# Patient Record
Sex: FEMALE | Race: WHITE | NOT HISPANIC OR LATINO | Employment: OTHER | URBAN - NONMETROPOLITAN AREA
[De-identification: names, ages, dates, MRNs, and addresses within clinical notes are randomized per-mention and may not be internally consistent; named-entity substitution may affect disease eponyms.]

---

## 2017-03-09 RX ORDER — LOSARTAN POTASSIUM 100 MG/1
TABLET ORAL
Qty: 90 TABLET | Refills: 1 | Status: SHIPPED | OUTPATIENT
Start: 2017-03-09 | End: 2017-08-08 | Stop reason: SDUPTHER

## 2017-06-07 RX ORDER — DILTIAZEM HYDROCHLORIDE 300 MG/1
CAPSULE, COATED, EXTENDED RELEASE ORAL
Qty: 30 CAPSULE | Refills: 0 | Status: SHIPPED | OUTPATIENT
Start: 2017-06-07 | End: 2017-07-27 | Stop reason: SDUPTHER

## 2017-06-07 RX ORDER — TOLTERODINE 4 MG/1
CAPSULE, EXTENDED RELEASE ORAL
Qty: 30 CAPSULE | Refills: 0 | Status: SHIPPED | OUTPATIENT
Start: 2017-06-07 | End: 2017-09-28 | Stop reason: SDUPTHER

## 2017-06-07 RX ORDER — INSULIN DEGLUDEC 200 U/ML
INJECTION, SOLUTION SUBCUTANEOUS
Qty: 5 PEN | Refills: 0 | Status: SHIPPED | OUTPATIENT
Start: 2017-06-07 | End: 2017-07-27 | Stop reason: SDUPTHER

## 2017-06-07 NOTE — TELEPHONE ENCOUNTER
Patient will come in and get labs and schedule an appointment.  Are the standing orders in the computer all that she needs?

## 2017-06-12 ENCOUNTER — TELEPHONE (OUTPATIENT)
Dept: FAMILY MEDICINE CLINIC | Facility: CLINIC | Age: 47
End: 2017-06-12

## 2017-06-12 DIAGNOSIS — IMO0001 UNCONTROLLED TYPE 2 DIABETES MELLITUS WITHOUT COMPLICATION, WITH LONG-TERM CURRENT USE OF INSULIN: ICD-10-CM

## 2017-07-12 RX ORDER — DOXYCYCLINE 100 MG/1
100 CAPSULE ORAL 2 TIMES DAILY
Qty: 20 CAPSULE | Refills: 0 | Status: SHIPPED | OUTPATIENT
Start: 2017-07-12 | End: 2017-07-22

## 2017-07-21 ENCOUNTER — OFFICE VISIT (OUTPATIENT)
Dept: ORTHOPEDIC SURGERY | Facility: CLINIC | Age: 47
End: 2017-07-21

## 2017-07-21 VITALS
SYSTOLIC BLOOD PRESSURE: 212 MMHG | BODY MASS INDEX: 45.99 KG/M2 | HEART RATE: 85 BPM | WEIGHT: 293 LBS | DIASTOLIC BLOOD PRESSURE: 93 MMHG | HEIGHT: 67 IN

## 2017-07-21 DIAGNOSIS — R63.5 ABNORMAL WEIGHT GAIN: ICD-10-CM

## 2017-07-21 DIAGNOSIS — S91.209S AVULSED TOENAIL, SEQUELA: Primary | ICD-10-CM

## 2017-07-21 DIAGNOSIS — IMO0002 UNCONTROLLED TYPE 2 DIABETES MELLITUS WITH DIABETIC POLYNEUROPATHY, WITHOUT LONG-TERM CURRENT USE OF INSULIN: ICD-10-CM

## 2017-07-21 PROBLEM — S91.209A AVULSED TOENAIL: Status: ACTIVE | Noted: 2017-07-21

## 2017-07-21 PROCEDURE — 99203 OFFICE O/P NEW LOW 30 MIN: CPT | Performed by: ORTHOPAEDIC SURGERY

## 2017-07-21 RX ORDER — INSULIN ASPART 100 [IU]/ML
INJECTION, SOLUTION INTRAVENOUS; SUBCUTANEOUS
Refills: 6 | COMMUNITY
Start: 2017-05-08 | End: 2017-08-08 | Stop reason: SDUPTHER

## 2017-07-21 RX ORDER — EFINACONAZOLE 100 MG/ML
SOLUTION TOPICAL
Refills: 6 | COMMUNITY
Start: 2017-07-11 | End: 2018-01-15

## 2017-07-21 RX ORDER — KETOCONAZOLE 20 MG/G
CREAM TOPICAL
Refills: 5 | COMMUNITY
Start: 2017-07-10 | End: 2017-07-27

## 2017-07-21 RX ORDER — MELOXICAM 15 MG/1
TABLET ORAL
Refills: 0 | COMMUNITY
Start: 2017-07-07 | End: 2017-07-27

## 2017-07-21 NOTE — PROGRESS NOTES
NEW PATIENT    Patient: Azalea Mendoza  : 1970    Primary Care Provider: Shelby Hatfield MD    Requesting Provider: As above    Toe Pain (Left second toe ulcer)      History    Chief Complaint: left 2nd toe problem    History of Present Illness: this is a 46 year old diabetic who I have seen in the past for foot problems.  Her mother in law was my patient- had very severe diabetic problems, amputations, etc.  She had a left 2nd toenail removed at the podiatrist office and wants to make sure it is healing appropriately.  No fever, no chills.  The nail was removed because it was fungal.  She has not had a HGA1c done in a long time, it was in the high 6's when it was done.  She thinks it will be much worse now because she has gained weight and is not taking care of herself.      Current Outpatient Prescriptions on File Prior to Visit   Medication Sig Dispense Refill   • cetirizine (ZyrTEC) 10 MG tablet Take 1 tablet by mouth every night.     • diltiaZEM CD (CARDIZEM CD) 300 MG 24 hr capsule TAKE 1 CAPSULE DAILY. 30 capsule 0   • doxycycline (MONODOX) 100 MG capsule Take 1 capsule by mouth 2 (Two) Times a Day for 10 days. 20 capsule 0   • EPINEPHrine (EPIPEN 2-BILL) 0.3 MG/0.3ML solution auto-injector injection EpiPen 2-Bill 0.3 MG/0.3ML Injection Solution Auto-injector; Patient Sig: EpiPen 2-Bill 0.3 MG/0.3ML Injection Solution Auto-injector as directed; 1; 1; 28-Mar-2016; Active     • EPINEPHrine (EPIPEN) 0.3 MG/0.3ML solution auto-injector injection EpiPen 2-Bill 0.3 MG/0.3ML CAMILLA; Patient Sig: EpiPen 2-Bill 0.3 MG/0.3ML CAMILLA ; 2; 0; 07-Mar-2014; Active     • EPIPEN 2-BILL 0.3 MG/0.3ML solution auto-injector injection USE AS INSTRUCTED 1 each 1   • glucose blood test strip 1 each by Other route 3 (three) times a day. Use as instructed     • Insulin Degludec (TRESIBA FLEXTOUCH) 200 UNIT/ML solution pen-injector Inject 50 Units under the skin every night.     • Insulin Pen Needle 32G X 4 MM misc Use three daily with  Novolog and once daily with tresibia 180 each 11   • losartan (COZAAR) 100 MG tablet TAKE ONE TABLET BY MOUTH EVERY DAY 90 tablet 1   • mupirocin (BACTROBAN) 2 % cream Apply  topically 3 (three) times a day. 15 g 6   • omeprazole (PriLOSEC) 20 MG capsule TAKE 1 CAPSULE DAILY. 90 capsule 3   • tolterodine LA (DETROL LA) 4 MG 24 hr capsule TAKE 1 CAPSULE BY MOUTH DAILY. 30 capsule 0   • TRESIBA FLEXTOUCH 200 UNIT/ML solution pen-injector INJECT 50 UNITS UNDER THE SKIN EVERY NIGHT. 5 pen 0   • albuterol (PROVENTIL HFA;VENTOLIN HFA) 108 (90 BASE) MCG/ACT inhaler Inhale 1-2 puffs every 4 (four) hours as needed.     • fexofenadine (ALLEGRA) 180 MG tablet Take 1 tablet by mouth daily.       No current facility-administered medications on file prior to visit.       Allergies   Allergen Reactions   • Ciprofloxacin-Dexamethasone    • Fish Allergy Itching, Nausea Only and Nausea And Vomiting   • Neomycin-Bacitracin Zn-Polymyx    • Penicillins       Past Medical History:   Diagnosis Date   • Abnormal weight gain    • Acute bronchitis with bronchospasm    • Acute maxillary sinusitis    • Acute serous otitis media    • Acute suppurative otitis media of both ears without spontaneous rupture of tympanic membranes    • Allergic rhinitis due to pollen    • Back pain, lumbosacral    • Cervicalgia    • Cyst of breast    • Diabetes mellitus    • Esophagitis    • H/O cystoscopy     diagnostic cystoscopy   • Hypertension    • IBS (irritable bowel syndrome)    • Lump or mass in breast    • Muscle spasm    • Ovarian cancer    • Right knee pain    • Shingles    • Sleep apnea    • UTI (urinary tract infection)      Past Surgical History:   Procedure Laterality Date   • COLONOSCOPY     • CYST REMOVAL     • CYSTOSCOPY     • TOTAL ABDOMINAL HYSTERECTOMY WITH SALPINGO OOPHORECTOMY     • TUMOR REMOVAL       Family History   Problem Relation Age of Onset   • Hypertension Mother    • Diabetes Mother      type 2   • Arthritis Father    • Hypertension  "Father    • Hyperlipidemia Father    • Diabetes Father      type 2   • Cancer Other       Social History     Social History   • Marital status:      Spouse name: N/A   • Number of children: N/A   • Years of education: N/A     Occupational History   • Not on file.     Social History Main Topics   • Smoking status: Never Smoker   • Smokeless tobacco: Never Used   • Alcohol use No   • Drug use: No   • Sexual activity: Defer     Other Topics Concern   • Not on file     Social History Narrative        Review of Systems   Constitutional: Negative.    HENT: Negative.    Eyes: Negative.    Respiratory: Positive for apnea.    Cardiovascular: Negative.    Gastrointestinal: Negative.    Endocrine: Negative.    Genitourinary: Negative.    Musculoskeletal: Positive for joint swelling.   Skin: Negative.    Allergic/Immunologic: Positive for environmental allergies and food allergies.   Neurological: Negative.    Hematological: Negative.    Psychiatric/Behavioral: Negative.        The following portions of the patient's history were reviewed and updated as appropriate: allergies, current medications, past family history, past medical history, past social history, past surgical history and problem list.    Physical Exam:   BP (!) 212/93  Pulse 85  Ht 67\" (170.2 cm)  Wt (!) 358 lb (162 kg)  BMI 56.07 kg/m2  GENERAL: Body habitus: morbidly obese    Lower extremity edema: Left: none; Right: none    Varicose veins:  Left: none; Right: none    Gait: normal     Mental Status:  awake and alert; oriented to person, place, and time    Voice:  clear  SKIN:  Normal and warm and dry    Hair Growth:  Right:normal; Left:  normal  NAILS: Toenails: left 2nd toenil has been removed, the nail bed is healing well, no sign of infection  HEENT: Head: Normocephalic, atraumatic,  without obvious abnormality.  eye: normal external eye, no icterus  ears: normal external ears  nose: normal external nose  PULM:  Repiratory effort " normal  CV:  Dorsalis Pedis:  Right: 2+; Left:2+    Posterior Tibial: Right:2+; Left:2+    Capillary Refill:  Brisk       MSK/NEURO- dense neuropathy, no ulcers, no pre-ulcerous lesions, no Charcot  Medical Decision Making    Data Review:   none    Assessment and Plan/ Diagnosis/Treatment options:   Her nail bed is healing well, no concerns.  We discussed fungal nails, basically it is a cosmetic problem, rarely causes any bacterial infection.  I encouraged her to get her HGA1c done, and improve glucose control to avoid complications.  I will see her as needed

## 2017-07-27 ENCOUNTER — OFFICE VISIT (OUTPATIENT)
Dept: FAMILY MEDICINE CLINIC | Facility: CLINIC | Age: 47
End: 2017-07-27

## 2017-07-27 VITALS
BODY MASS INDEX: 55.91 KG/M2 | TEMPERATURE: 98.4 F | OXYGEN SATURATION: 95 % | SYSTOLIC BLOOD PRESSURE: 150 MMHG | HEART RATE: 101 BPM | WEIGHT: 293 LBS | RESPIRATION RATE: 18 BRPM | DIASTOLIC BLOOD PRESSURE: 102 MMHG

## 2017-07-27 DIAGNOSIS — E78.00 PURE HYPERCHOLESTEROLEMIA: ICD-10-CM

## 2017-07-27 DIAGNOSIS — M25.50 ARTHRALGIA OF MULTIPLE JOINTS: ICD-10-CM

## 2017-07-27 DIAGNOSIS — I10 BENIGN ESSENTIAL HYPERTENSION: Primary | ICD-10-CM

## 2017-07-27 DIAGNOSIS — IMO0001 UNCONTROLLED TYPE 2 DIABETES MELLITUS WITHOUT COMPLICATION, WITH LONG-TERM CURRENT USE OF INSULIN: ICD-10-CM

## 2017-07-27 PROCEDURE — 99214 OFFICE O/P EST MOD 30 MIN: CPT | Performed by: INTERNAL MEDICINE

## 2017-07-27 RX ORDER — DILTIAZEM HYDROCHLORIDE 360 MG/1
360 CAPSULE, EXTENDED RELEASE ORAL DAILY
Qty: 30 CAPSULE | Refills: 6 | Status: SHIPPED | OUTPATIENT
Start: 2017-07-27 | End: 2017-08-08 | Stop reason: SDUPTHER

## 2017-07-27 NOTE — PROGRESS NOTES
Subjective   Azalea Mendoza is a 46 y.o. female.     Chief Complaint   Patient presents with   • Diabetes   • Hyperlipidemia   • Hypertension     Patient complains of elevated blood pressure recently at her other doctor appointments.    • Joint Pain     Hpi:  Patient is here to follow up  on the  blood pressure and cholesterol and  sugar  . The patient  is taking the blood pressure  medications as prescribed and   Her blood pressure is noted to be elevated The patient  also needs refills on  medications . The patient  is also here to follow up  on  the  cholesterol  and  is trying to follow a diet. The patient is also here to follow on sugar and due to get lab work done , Patient also needs the medications to be refilled today.    She is taking her insulin  - tresiba 50 u qhs but novolog 25 u bid instead of tid as she forgets the afternoon dose ,  she also has chronic joint pain and saw a rheumatologist , she saw the orthopedist dr geronimo soto and got shots in the knees for knee joint pain ,she states  She had an elevated blood pressure when she saw the specialist  Diabetes   Hypoglycemia symptoms include headaches. Pertinent negatives for hypoglycemia include no dizziness or nervousness/anxiousness. Pertinent negatives for diabetes include no chest pain, no fatigue and no weakness.   Hyperlipidemia   Pertinent negatives include no chest pain or shortness of breath.   Hypertension   Associated symptoms include headaches. Pertinent negatives include no chest pain, palpitations or shortness of breath.   Headache    Pertinent negatives include no abdominal pain, back pain, coughing, dizziness, ear pain, eye pain, fever, nausea, numbness, sinus pressure, sore throat or weakness.        The following portions of the patient's history were reviewed and updated as appropriate: allergies, current medications, past family history, past medical history, past social history, past surgical history and problem list.    Review  of Systems   Constitutional: Negative for appetite change, fatigue and fever.   HENT: Negative for congestion, ear discharge, ear pain, sinus pressure and sore throat.    Eyes: Negative for pain and discharge.   Respiratory: Negative for cough, chest tightness, shortness of breath and wheezing.    Cardiovascular: Negative for chest pain, palpitations and leg swelling.   Gastrointestinal: Negative for abdominal pain, blood in stool, constipation, diarrhea and nausea.   Endocrine: Negative for cold intolerance and heat intolerance.   Genitourinary: Negative for dysuria, flank pain and frequency.   Musculoskeletal: Positive for arthralgias. Negative for back pain and joint swelling.   Skin: Negative for color change.   Allergic/Immunologic: Negative for environmental allergies and food allergies.   Neurological: Positive for headaches. Negative for dizziness, weakness and numbness.   Hematological: Negative for adenopathy. Does not bruise/bleed easily.   Psychiatric/Behavioral: Negative for behavioral problems and dysphoric mood. The patient is not nervous/anxious.          Current Outpatient Prescriptions:   •  cetirizine (ZyrTEC) 10 MG tablet, Take 1 tablet by mouth every night., Disp: , Rfl:   •  diltiaZEM CD (CARDIZEM CD) 360 MG 24 hr capsule, Take 1 capsule by mouth Daily., Disp: 30 capsule, Rfl: 6  •  EPINEPHrine (EPIPEN 2-DAVID) 0.3 MG/0.3ML solution auto-injector injection, EpiPen 2-David 0.3 MG/0.3ML Injection Solution Auto-injector; Patient Sig: EpiPen 2-David 0.3 MG/0.3ML Injection Solution Auto-injector as directed; 1; 1; 28-Mar-2016; Active, Disp: , Rfl:   •  glucose blood test strip, 1 each by Other route 3 (three) times a day. Use as instructed, Disp: , Rfl:   •  Insulin Degludec (TRESIBA FLEXTOUCH) 200 UNIT/ML solution pen-injector, Inject 50 Units under the skin every night., Disp: , Rfl:   •  Insulin Pen Needle 32G X 4 MM misc, Use three daily with Novolog and once daily with tresibia, Disp: 180 each, Rfl:  11  •  JUBLIA 10 % solution, APPLY ONCE A DAY TO AFFECTED TOENAIL AS DIRECTED, Disp: , Rfl: 6  •  losartan (COZAAR) 100 MG tablet, TAKE ONE TABLET BY MOUTH EVERY DAY, Disp: 90 tablet, Rfl: 1  •  mupirocin (BACTROBAN) 2 % cream, Apply  topically 3 (three) times a day., Disp: 15 g, Rfl: 6  •  NOVOLOG FLEXPEN 100 UNIT/ML solution pen-injector sc pen, INJECT 25 UNITS UNDER THE SKIN 3 (THREE) TIMES A DAY WITH MEALS FOR 90 DAYS., Disp: , Rfl: 6  •  omeprazole (PriLOSEC) 20 MG capsule, TAKE 1 CAPSULE DAILY., Disp: 90 capsule, Rfl: 3  •  tolterodine LA (DETROL LA) 4 MG 24 hr capsule, TAKE 1 CAPSULE BY MOUTH DAILY., Disp: 30 capsule, Rfl: 0    Objective     Blood pressure (!) 150/102, pulse 101, temperature 98.4 °F (36.9 °C), temperature source Oral, resp. rate 18, weight (!) 357 lb (162 kg), SpO2 95 %.    Physical Exam   Constitutional: She is oriented to person, place, and time. She appears well-developed and well-nourished. No distress.   HENT:   Head: Normocephalic and atraumatic.   Right Ear: External ear normal.   Left Ear: External ear normal.   Nose: Nose normal.   Mouth/Throat: Oropharynx is clear and moist.   Eyes: Conjunctivae and EOM are normal. Pupils are equal, round, and reactive to light.   Neck: Neck supple. No thyromegaly present.   Cardiovascular: Normal rate, regular rhythm and normal heart sounds.    Pulmonary/Chest: Effort normal and breath sounds normal. No respiratory distress.   Abdominal: Soft. Bowel sounds are normal. She exhibits no distension. There is no tenderness. There is no rebound.   Musculoskeletal: She exhibits deformity. She exhibits no edema.   Lymphadenopathy:     She has no cervical adenopathy.   Neurological: She is alert and oriented to person, place, and time.   No gross motor or sensory deficits   Skin: Skin is warm. She is not diaphoretic.   Psychiatric: She has a normal mood and affect.   Nursing note and vitals reviewed.      Results for orders placed or performed in visit on  06/30/16   Comprehensive metabolic panel   Result Value Ref Range    Glucose 109 (H) 70 - 100 mg/dL    BUN 13 9 - 23 mg/dL    Creatinine 0.60 0.60 - 1.30 mg/dL    Sodium 143 132 - 146 mmol/L    Potassium 4.3 3.5 - 5.5 mmol/L    Chloride 106 99 - 109 mmol/L    CO2 28.0 20.0 - 31.0 mmol/L    Calcium 10.5 (H) 8.7 - 10.4 mg/dL    Total Protein 6.9 5.7 - 8.2 g/dL    Albumin 4.20 3.20 - 4.80 g/dL    ALT (SGPT) 9 7 - 40 U/L    AST (SGOT) 18 0 - 33 U/L    Alkaline Phosphatase 96 25 - 100 U/L    Total Bilirubin 0.5 0.3 - 1.2 mg/dL    eGFR Non African Amer 109 >60 mL/min/1.73    Globulin 2.7 gm/dL    A/G Ratio 1.6 g/dL    BUN/Creatinine Ratio 21.7 7.0 - 25.0    Anion Gap 9.0 3.0 - 11.0 mmol/L   Hemoglobin A1c   Result Value Ref Range    Hemoglobin A1C 6.70 (H) 4.00 - 6.00 %   Albumin, urine, random   Result Value Ref Range    Microalbumin, Urine 5.5 Not Estab. ug/mL   Lipid panel   Result Value Ref Range    Total Cholesterol 156 0 - 200 mg/dL    Triglycerides 108 0 - 150 mg/dL    HDL Cholesterol 37 (L) 40 - 60 mg/dL    LDL Cholesterol  101 0 - 130 mg/dL    LDL/HDL Ratio      VLDL Cholesterol 21.6 mg/dL   CBC auto differential   Result Value Ref Range    WBC 10.26 3.50 - 10.80 10*3/mm3    RBC 5.05 3.89 - 5.14 10*6/mm3    Hemoglobin 14.0 11.5 - 15.5 g/dL    Hematocrit 42.8 34.5 - 44.0 %    MCV 84.8 80.0 - 99.0 fL    MCH 27.7 27.0 - 31.0 pg    MCHC 32.7 32.0 - 36.0 g/dL    RDW 14.5 11.3 - 14.5 %    RDW-SD 44.8 37.0 - 54.0 fl    MPV 10.3 6.0 - 12.0 fL    Platelets 314 150 - 450 10*3/mm3    Neutrophil % 59.0 41.0 - 71.0 %    Lymphocyte % 31.0 24.0 - 44.0 %    Monocyte % 5.1 0.0 - 12.0 %    Eosinophil % 4.4 (H) 0.0 - 3.0 %    Basophil % 0.2 0.0 - 1.0 %    Immature Grans % 0.3 0.0 - 0.6 %    Neutrophils, Absolute 6.06 1.50 - 8.30 10*3/mm3    Lymphocytes, Absolute 3.18 0.60 - 4.80 10*3/mm3    Monocytes, Absolute 0.52 0.00 - 1.00 10*3/mm3    Eosinophils, Absolute 0.45 (H) 0.10 - 0.30 10*3/mm3    Basophils, Absolute 0.02 0.00 - 0.20  10*3/mm3    Immature Grans, Absolute 0.03 0.00 - 0.03 10*3/mm3         Assessment/Plan   Azalea was seen today for diabetes, hyperlipidemia, hypertension and joint pain.    Diagnoses and all orders for this visit:    Benign essential hypertension    Pure hypercholesterolemia    Uncontrolled type 2 diabetes mellitus without complication, with long-term current use of insulin    Arthralgia of multiple joints    Other orders  -     diltiaZEM CD (CARDIZEM CD) 360 MG 24 hr capsule; Take 1 capsule by mouth Daily.        Plan:  1.Benign essential hypertension: Will increase to cardiazem to 360 mg qhs, Continue on losartan 100 mg po qd   low-sodium diet advise.  2. Diabetes mellitus : Continue with tresiba 50 u qhs  at bedtime and NovoLog 25 units 3 times a day., Diet and excise counseled  3.Multiple, joint pain: stop nsaids , advised to follow up with  rheumatology for further recommendations  4. Mixed hyperlipidemia :   Will obtain labs prior to follow-up appointment            Shelby Hatfield MD

## 2017-08-02 ENCOUNTER — LAB (OUTPATIENT)
Dept: FAMILY MEDICINE CLINIC | Facility: CLINIC | Age: 47
End: 2017-08-02

## 2017-08-02 DIAGNOSIS — I10 BENIGN ESSENTIAL HYPERTENSION: ICD-10-CM

## 2017-08-02 DIAGNOSIS — E78.2 MIXED HYPERLIPIDEMIA: ICD-10-CM

## 2017-08-02 DIAGNOSIS — IMO0002 TYPE II DIABETES MELLITUS, UNCONTROLLED: ICD-10-CM

## 2017-08-03 LAB
ALBUMIN SERPL-MCNC: 4.2 G/DL (ref 3.5–5)
ALBUMIN/CREAT UR: 8.4 MG/G CREAT (ref 0–30)
ALBUMIN/GLOB SERPL: 1.6 G/DL (ref 1–2)
ALP SERPL-CCNC: 134 U/L (ref 38–126)
ALT SERPL-CCNC: 25 U/L (ref 13–69)
AST SERPL-CCNC: 18 U/L (ref 15–46)
BASOPHILS # BLD AUTO: 0.05 10*3/MM3 (ref 0–0.2)
BASOPHILS NFR BLD AUTO: 0.5 % (ref 0–2.5)
BILIRUB SERPL-MCNC: 0.7 MG/DL (ref 0.2–1.3)
BUN SERPL-MCNC: 13 MG/DL (ref 7–20)
BUN/CREAT SERPL: 21.7 (ref 7.1–23.5)
CALCIUM SERPL-MCNC: 10.5 MG/DL (ref 8.4–10.2)
CHLORIDE SERPL-SCNC: 103 MMOL/L (ref 98–107)
CHOLEST SERPL-MCNC: 184 MG/DL (ref 0–199)
CO2 SERPL-SCNC: 24 MMOL/L (ref 26–30)
CREAT SERPL-MCNC: 0.6 MG/DL (ref 0.6–1.3)
CREAT UR-MCNC: 45.2 MG/DL
EOSINOPHIL # BLD AUTO: 0.3 10*3/MM3 (ref 0–0.7)
EOSINOPHIL NFR BLD AUTO: 2.7 % (ref 0–7)
ERYTHROCYTE [DISTWIDTH] IN BLOOD BY AUTOMATED COUNT: 13.7 % (ref 11.5–14.5)
GLOBULIN SER CALC-MCNC: 2.7 GM/DL
GLUCOSE SERPL-MCNC: 227 MG/DL (ref 74–98)
HBA1C MFR BLD: 10.2 %
HCT VFR BLD AUTO: 44.9 % (ref 37–47)
HDLC SERPL-MCNC: 43 MG/DL (ref 40–60)
HGB BLD-MCNC: 14.6 G/DL (ref 12–16)
IMM GRANULOCYTES # BLD: 0.05 10*3/MM3 (ref 0–0.06)
IMM GRANULOCYTES NFR BLD: 0.5 % (ref 0–0.6)
LDLC SERPL CALC-MCNC: 101 MG/DL (ref 0–99)
LYMPHOCYTES # BLD AUTO: 3.33 10*3/MM3 (ref 0.6–3.4)
LYMPHOCYTES NFR BLD AUTO: 30.4 % (ref 10–50)
MCH RBC QN AUTO: 27.4 PG (ref 27–31)
MCHC RBC AUTO-ENTMCNC: 32.5 G/DL (ref 30–37)
MCV RBC AUTO: 84.2 FL (ref 81–99)
MICROALBUMIN UR-MCNC: 3.8 UG/ML
MONOCYTES # BLD AUTO: 0.53 10*3/MM3 (ref 0–0.9)
MONOCYTES NFR BLD AUTO: 4.8 % (ref 0–12)
NEUTROPHILS # BLD AUTO: 6.68 10*3/MM3 (ref 2–6.9)
NEUTROPHILS NFR BLD AUTO: 61.1 % (ref 37–80)
NRBC BLD AUTO-RTO: 0 /100 WBC (ref 0–0)
PLATELET # BLD AUTO: 321 10*3/MM3 (ref 130–400)
POTASSIUM SERPL-SCNC: 4.3 MMOL/L (ref 3.5–5.1)
PROT SERPL-MCNC: 6.9 G/DL (ref 6.3–8.2)
RBC # BLD AUTO: 5.33 10*6/MM3 (ref 4.2–5.4)
SODIUM SERPL-SCNC: 142 MMOL/L (ref 137–145)
TRIGL SERPL-MCNC: 201 MG/DL
VLDLC SERPL CALC-MCNC: 40.2 MG/DL
WBC # BLD AUTO: 10.94 10*3/MM3 (ref 4.8–10.8)

## 2017-08-08 ENCOUNTER — OFFICE VISIT (OUTPATIENT)
Dept: FAMILY MEDICINE CLINIC | Facility: CLINIC | Age: 47
End: 2017-08-08

## 2017-08-08 VITALS
OXYGEN SATURATION: 99 % | BODY MASS INDEX: 45.99 KG/M2 | DIASTOLIC BLOOD PRESSURE: 88 MMHG | HEART RATE: 102 BPM | SYSTOLIC BLOOD PRESSURE: 152 MMHG | HEIGHT: 67 IN | TEMPERATURE: 98.7 F | WEIGHT: 293 LBS

## 2017-08-08 DIAGNOSIS — D72.828 OTHER ELEVATED WHITE BLOOD CELL (WBC) COUNT: ICD-10-CM

## 2017-08-08 DIAGNOSIS — IMO0001 UNCONTROLLED TYPE 2 DIABETES MELLITUS WITHOUT COMPLICATION, WITH LONG-TERM CURRENT USE OF INSULIN: ICD-10-CM

## 2017-08-08 DIAGNOSIS — M25.50 ARTHRALGIA OF MULTIPLE JOINTS: ICD-10-CM

## 2017-08-08 DIAGNOSIS — E83.52 HYPERCALCEMIA: ICD-10-CM

## 2017-08-08 DIAGNOSIS — I10 BENIGN ESSENTIAL HYPERTENSION: Primary | ICD-10-CM

## 2017-08-08 DIAGNOSIS — E78.00 PURE HYPERCHOLESTEROLEMIA: ICD-10-CM

## 2017-08-08 PROCEDURE — 99214 OFFICE O/P EST MOD 30 MIN: CPT | Performed by: INTERNAL MEDICINE

## 2017-08-08 RX ORDER — DILTIAZEM HYDROCHLORIDE 360 MG/1
360 CAPSULE, EXTENDED RELEASE ORAL DAILY
Qty: 90 CAPSULE | Refills: 3 | Status: SHIPPED | OUTPATIENT
Start: 2017-08-08 | End: 2017-12-05 | Stop reason: SDUPTHER

## 2017-08-08 RX ORDER — OMEPRAZOLE 20 MG/1
20 CAPSULE, DELAYED RELEASE ORAL DAILY
Qty: 90 CAPSULE | Refills: 3 | Status: SHIPPED | OUTPATIENT
Start: 2017-08-08 | End: 2017-12-05 | Stop reason: SDUPTHER

## 2017-08-08 RX ORDER — TRAMADOL HYDROCHLORIDE 50 MG/1
50 TABLET ORAL EVERY 12 HOURS PRN
Qty: 60 TABLET | Refills: 0 | Status: SHIPPED | OUTPATIENT
Start: 2017-08-08 | End: 2018-06-11

## 2017-08-08 RX ORDER — INSULIN ASPART 100 [IU]/ML
40 INJECTION, SOLUTION INTRAVENOUS; SUBCUTANEOUS
Qty: 5 PEN | Refills: 11 | Status: SHIPPED | OUTPATIENT
Start: 2017-08-08 | End: 2017-12-05 | Stop reason: SDUPTHER

## 2017-08-08 RX ORDER — LOSARTAN POTASSIUM 100 MG/1
100 TABLET ORAL DAILY
Qty: 90 TABLET | Refills: 3 | Status: SHIPPED | OUTPATIENT
Start: 2017-08-08 | End: 2017-12-05 | Stop reason: SDUPTHER

## 2017-08-08 NOTE — PROGRESS NOTES
Subjective   Azalea Mendoza is a 46 y.o. female.     Chief Complaint   Patient presents with   • Hypertension         • Hyperlipidemia   • Diabetes   • Joint Pain     Hpi:  Patient is here to follow up  on the  blood pressure and cholesterol and  sugar  . The patient  is taking the blood pressure  medications as prescribed  The patient  also needs refills on  medications . The patient  is also here to follow up  on  the  cholesterol  and  is trying to follow a diet. The patient is also here to follow on sugar and  had lab work done ,     She is taking her insulin  - tresiba 50 u qhs but novolog 25 u bid instead of tid as she forgets the afternoon dose ,  she also has chronic joint pain and saw a rheumatologist , she saw the orthopedist dr geronimo soto and got shots in the knees for knee joint pain ,she states  She had an elevated blood pressure when she saw the specialist ,she stopped her meloxicam and now has a lot of joint pain  Hypertension   Pertinent negatives include no chest pain, palpitations or shortness of breath.   Hyperlipidemia   Pertinent negatives include no chest pain or shortness of breath.   Diabetes   Pertinent negatives for hypoglycemia include no dizziness or nervousness/anxiousness. Pertinent negatives for diabetes include no chest pain, no fatigue and no weakness.   Headache    Pertinent negatives include no abdominal pain, back pain, coughing, dizziness, ear pain, eye pain, fever, nausea, numbness, sinus pressure, sore throat or weakness.        The following portions of the patient's history were reviewed and updated as appropriate: allergies, current medications, past family history, past medical history, past social history, past surgical history and problem list.    Review of Systems   Constitutional: Negative for appetite change, fatigue and fever.   HENT: Negative for congestion, ear discharge, ear pain, sinus pressure and sore throat.    Eyes: Negative for pain and discharge.    Respiratory: Negative for cough, chest tightness, shortness of breath and wheezing.    Cardiovascular: Negative for chest pain, palpitations and leg swelling.   Gastrointestinal: Negative for abdominal pain, blood in stool, constipation, diarrhea and nausea.   Endocrine: Negative for cold intolerance and heat intolerance.   Genitourinary: Negative for dysuria, flank pain and frequency.   Musculoskeletal: Positive for arthralgias. Negative for back pain and joint swelling.   Skin: Negative for color change.   Allergic/Immunologic: Negative for environmental allergies and food allergies.   Neurological: Negative for dizziness, weakness and numbness.   Hematological: Negative for adenopathy. Does not bruise/bleed easily.   Psychiatric/Behavioral: Negative for behavioral problems and dysphoric mood. The patient is not nervous/anxious.          Current Outpatient Prescriptions:   •  cetirizine (ZyrTEC) 10 MG tablet, Take 1 tablet by mouth every night., Disp: , Rfl:   •  diltiaZEM CD (CARDIZEM CD) 360 MG 24 hr capsule, Take 1 capsule by mouth Daily., Disp: 90 capsule, Rfl: 3  •  EPINEPHrine (EPIPEN 2-DAVID) 0.3 MG/0.3ML solution auto-injector injection, EpiPen 2-David 0.3 MG/0.3ML Injection Solution Auto-injector; Patient Sig: EpiPen 2-David 0.3 MG/0.3ML Injection Solution Auto-injector as directed; 1; 1; 28-Mar-2016; Active, Disp: , Rfl:   •  glucose blood test strip, 1 each by Other route 3 (three) times a day. Use as instructed, Disp: , Rfl:   •  Insulin Degludec (TRESIBA FLEXTOUCH) 200 UNIT/ML solution pen-injector, Inject 70 Units under the skin Every Night., Disp: 5 pen, Rfl: 11  •  Insulin Pen Needle 32G X 4 MM misc, Use three daily with Novolog and once daily with tresibia, Disp: 180 each, Rfl: 11  •  JUBLIA 10 % solution, APPLY ONCE A DAY TO AFFECTED TOENAIL AS DIRECTED, Disp: , Rfl: 6  •  losartan (COZAAR) 100 MG tablet, Take 1 tablet by mouth Daily., Disp: 90 tablet, Rfl: 3  •  mupirocin (BACTROBAN) 2 % cream,  "Apply  topically 3 (three) times a day., Disp: 15 g, Rfl: 6  •  NOVOLOG FLEXPEN 100 UNIT/ML solution pen-injector sc pen, Inject 40 Units under the skin 3 (Three) Times a Day With Meals., Disp: 5 pen, Rfl: 11  •  omeprazole (priLOSEC) 20 MG capsule, Take 1 capsule by mouth Daily., Disp: 90 capsule, Rfl: 3  •  tolterodine LA (DETROL LA) 4 MG 24 hr capsule, TAKE 1 CAPSULE BY MOUTH DAILY., Disp: 30 capsule, Rfl: 0  •  traMADol (ULTRAM) 50 MG tablet, Take 1 tablet by mouth Every 12 (Twelve) Hours As Needed for Moderate Pain (4-6) or Severe Pain (7-10)., Disp: 60 tablet, Rfl: 0    Objective     Blood pressure 152/88, pulse 102, temperature 98.7 °F (37.1 °C), height 67\" (170.2 cm), weight (!) 362 lb (164 kg), SpO2 99 %.    Physical Exam   Constitutional: She is oriented to person, place, and time. She appears well-developed and well-nourished. No distress.   HENT:   Head: Normocephalic and atraumatic.   Right Ear: External ear normal.   Left Ear: External ear normal.   Nose: Nose normal.   Mouth/Throat: Oropharynx is clear and moist.   Eyes: Conjunctivae and EOM are normal. Pupils are equal, round, and reactive to light.   Neck: Neck supple. No thyromegaly present.   Cardiovascular: Normal rate, regular rhythm and normal heart sounds.    Pulmonary/Chest: Effort normal and breath sounds normal. No respiratory distress.   Abdominal: Soft. Bowel sounds are normal. She exhibits no distension. There is no tenderness. There is no rebound.   Musculoskeletal: She exhibits deformity. She exhibits no edema.   Lymphadenopathy:     She has no cervical adenopathy.   Neurological: She is alert and oriented to person, place, and time.   No gross motor or sensory deficits   Skin: Skin is warm. She is not diaphoretic.   Psychiatric: She has a normal mood and affect.   Nursing note and vitals reviewed.      Results for orders placed or performed in visit on 08/08/17   Comprehensive Metabolic Panel   Result Value Ref Range    Glucose      " BUN      Creatinine      Sodium      Potassium      Chloride      Total CO2      Calcium      Total Protein      Albumin      Globulin      A/G Ratio      Total Bilirubin      Alkaline Phosphatase      AST (SGOT)      ALT (SGPT)     PTH, Intact   Result Value Ref Range    PTH, Intact     Calcium, Ionized   Result Value Ref Range    Ionized Calcium     CBC & Differential   Result Value Ref Range    WBC 11.63 (H) 4.80 - 10.80 10*3/mm3    RBC 5.69 (H) 4.20 - 5.40 10*6/mm3    Hemoglobin 15.6 12.0 - 16.0 g/dL    Hematocrit 47.5 (H) 37.0 - 47.0 %    MCV 83.5 81.0 - 99.0 fL    MCH 27.4 27.0 - 31.0 pg    MCHC 32.8 30.0 - 37.0 g/dL    RDW 13.3 11.5 - 14.5 %    Platelets 360 130 - 400 10*3/mm3    Neutrophil Rel % 63.5 37.0 - 80.0 %    Lymphocyte Rel % 29.3 10.0 - 50.0 %    Monocyte Rel % 4.5 0.0 - 12.0 %    Eosinophil Rel % 1.7 0.0 - 7.0 %    Basophil Rel % 0.5 0.0 - 2.5 %    Neutrophils Absolute 7.38 (H) 2.00 - 6.90 10*3/mm3    Lymphocytes Absolute 3.41 (H) 0.60 - 3.40 10*3/mm3    Monocytes Absolute 0.52 0.00 - 0.90 10*3/mm3    Eosinophils Absolute 0.20 0.00 - 0.70 10*3/mm3    Basophils Absolute 0.06 0.00 - 0.20 10*3/mm3    Immature Granulocyte Rel % 0.5 0.0 - 0.6 %    Immature Grans Absolute 0.06 0.00 - 0.06 10*3/mm3    nRBC 0.0 0.0 - 0.0 /100 WBC         Assessment/Plan   Azalea was seen today for hypertension, hyperlipidemia, diabetes and joint pain.    Diagnoses and all orders for this visit:    Benign essential hypertension    Pure hypercholesterolemia    Uncontrolled type 2 diabetes mellitus without complication, with long-term current use of insulin    Arthralgia of multiple joints  -     Urine Drug Screen    Hypercalcemia  -     Comprehensive Metabolic Panel  -     PTH, Intact  -     Calcium, Ionized    Other elevated white blood cell (WBC) count  -     CBC & Differential    Other orders  -     losartan (COZAAR) 100 MG tablet; Take 1 tablet by mouth Daily.  -     omeprazole (priLOSEC) 20 MG capsule; Take 1 capsule by  mouth Daily.  -     diltiaZEM CD (CARDIZEM CD) 360 MG 24 hr capsule; Take 1 capsule by mouth Daily.  -     Insulin Degludec (TRESIBA FLEXTOUCH) 200 UNIT/ML solution pen-injector; Inject 70 Units under the skin Every Night.  -     NOVOLOG FLEXPEN 100 UNIT/ML solution pen-injector sc pen; Inject 40 Units under the skin 3 (Three) Times a Day With Meals.  -     traMADol (ULTRAM) 50 MG tablet; Take 1 tablet by mouth Every 12 (Twelve) Hours As Needed for Moderate Pain (4-6) or Severe Pain (7-10).        Plan:  1.Benign essential hypertension: Will continue  cardiazem to 360 mg qhs, Continue on losartan 100 mg po qd   low-sodium diet advise.  2. Diabetes mellitus : Continue with tresiba 70 u qhs  at bedtime and NovoLog 40 units 3 times a day., Diet and excise counseled  3.Multiple, joint pain: stop nsaids , advised to follow up with  rheumatology for further recommendations , tramadol 50mg po q12 hrs prn , uds  And kodi to be obtained and narcotic contract signed   4. Mixed hyperlipidemia :    Reviewed  labs , repeat lipid panel prior to follow-up appointment, ldl 101   5. Elevated wbc : will get cbc today , asymptomatic  6. Hypercalcemia : will repeat cmp , get intact pth  And ionized calcium levels and oral hydration         Shelby Hatfield MD

## 2017-08-09 LAB
ALBUMIN SERPL-MCNC: 4.3 G/DL (ref 3.5–5)
ALBUMIN/GLOB SERPL: 1.5 G/DL (ref 1–2)
ALP SERPL-CCNC: 125 U/L (ref 38–126)
ALT SERPL-CCNC: 23 U/L (ref 13–69)
AST SERPL-CCNC: 19 U/L (ref 15–46)
BASOPHILS # BLD AUTO: 0.06 10*3/MM3 (ref 0–0.2)
BASOPHILS NFR BLD AUTO: 0.5 % (ref 0–2.5)
BILIRUB SERPL-MCNC: 0.7 MG/DL (ref 0.2–1.3)
BUN SERPL-MCNC: 15 MG/DL (ref 7–20)
BUN/CREAT SERPL: 25 (ref 7.1–23.5)
CA-I SERPL ISE-MCNC: 5.9 MG/DL (ref 4.5–5.6)
CALCIUM SERPL-MCNC: 11 MG/DL (ref 8.4–10.2)
CHLORIDE SERPL-SCNC: 103 MMOL/L (ref 98–107)
CO2 SERPL-SCNC: 24 MMOL/L (ref 26–30)
CREAT SERPL-MCNC: 0.6 MG/DL (ref 0.6–1.3)
EOSINOPHIL # BLD AUTO: 0.2 10*3/MM3 (ref 0–0.7)
EOSINOPHIL NFR BLD AUTO: 1.7 % (ref 0–7)
ERYTHROCYTE [DISTWIDTH] IN BLOOD BY AUTOMATED COUNT: 13.3 % (ref 11.5–14.5)
GLOBULIN SER CALC-MCNC: 2.9 GM/DL
GLUCOSE SERPL-MCNC: 207 MG/DL (ref 74–98)
HCT VFR BLD AUTO: 47.5 % (ref 37–47)
HGB BLD-MCNC: 15.6 G/DL (ref 12–16)
IMM GRANULOCYTES # BLD: 0.06 10*3/MM3 (ref 0–0.06)
IMM GRANULOCYTES NFR BLD: 0.5 % (ref 0–0.6)
LYMPHOCYTES # BLD AUTO: 3.41 10*3/MM3 (ref 0.6–3.4)
LYMPHOCYTES NFR BLD AUTO: 29.3 % (ref 10–50)
MCH RBC QN AUTO: 27.4 PG (ref 27–31)
MCHC RBC AUTO-ENTMCNC: 32.8 G/DL (ref 30–37)
MCV RBC AUTO: 83.5 FL (ref 81–99)
MONOCYTES # BLD AUTO: 0.52 10*3/MM3 (ref 0–0.9)
MONOCYTES NFR BLD AUTO: 4.5 % (ref 0–12)
NEUTROPHILS # BLD AUTO: 7.38 10*3/MM3 (ref 2–6.9)
NEUTROPHILS NFR BLD AUTO: 63.5 % (ref 37–80)
NRBC BLD AUTO-RTO: 0 /100 WBC (ref 0–0)
PLATELET # BLD AUTO: 360 10*3/MM3 (ref 130–400)
POTASSIUM SERPL-SCNC: 4.5 MMOL/L (ref 3.5–5.1)
PROT SERPL-MCNC: 7.2 G/DL (ref 6.3–8.2)
PTH-INTACT SERPL-MCNC: 48 PG/ML (ref 15–65)
RBC # BLD AUTO: 5.69 10*6/MM3 (ref 4.2–5.4)
SODIUM SERPL-SCNC: 140 MMOL/L (ref 137–145)
WBC # BLD AUTO: 11.63 10*3/MM3 (ref 4.8–10.8)

## 2017-08-10 DIAGNOSIS — E11.65 UNCONTROLLED TYPE 2 DIABETES MELLITUS WITH HYPERGLYCEMIA, WITH LONG-TERM CURRENT USE OF INSULIN (HCC): Primary | ICD-10-CM

## 2017-08-10 DIAGNOSIS — Z79.4 UNCONTROLLED TYPE 2 DIABETES MELLITUS WITH HYPERGLYCEMIA, WITH LONG-TERM CURRENT USE OF INSULIN (HCC): Primary | ICD-10-CM

## 2017-08-10 DIAGNOSIS — E83.52 HYPERCALCEMIA: ICD-10-CM

## 2017-08-24 ENCOUNTER — HOSPITAL ENCOUNTER (OUTPATIENT)
Dept: GENERAL RADIOLOGY | Facility: HOSPITAL | Age: 47
Discharge: HOME | End: 2017-08-24
Admitting: INTERNAL MEDICINE

## 2017-08-24 ENCOUNTER — OFFICE VISIT (OUTPATIENT)
Dept: FAMILY MEDICINE CLINIC | Facility: CLINIC | Age: 47
End: 2017-08-24

## 2017-08-24 VITALS
WEIGHT: 293 LBS | TEMPERATURE: 98.1 F | BODY MASS INDEX: 56.38 KG/M2 | DIASTOLIC BLOOD PRESSURE: 83 MMHG | HEART RATE: 78 BPM | OXYGEN SATURATION: 98 % | RESPIRATION RATE: 16 BRPM | SYSTOLIC BLOOD PRESSURE: 153 MMHG

## 2017-08-24 DIAGNOSIS — I10 BENIGN ESSENTIAL HYPERTENSION: Primary | ICD-10-CM

## 2017-08-24 DIAGNOSIS — M25.572 ACUTE LEFT ANKLE PAIN: ICD-10-CM

## 2017-08-24 DIAGNOSIS — IMO0001 UNCONTROLLED TYPE 2 DIABETES MELLITUS WITHOUT COMPLICATION, WITH LONG-TERM CURRENT USE OF INSULIN: ICD-10-CM

## 2017-08-24 PROCEDURE — 99213 OFFICE O/P EST LOW 20 MIN: CPT | Performed by: INTERNAL MEDICINE

## 2017-08-24 PROCEDURE — 73610 X-RAY EXAM OF ANKLE: CPT

## 2017-08-24 NOTE — PROGRESS NOTES
Subjective   Azalea Mendoza is a 46 y.o. female.     Chief Complaint   Patient presents with   • Ankle Pain   • Hypertension   • Diabetes       History of Present Illness   Hpi:  Patient is here to follow up  on the  blood pressure and    sugar  . The patient  is taking the blood pressure  medications as prescribed  , Patient complains of left ankle pain and swelling that started approximately 1.5 weeks ago. She does not recall injuring that ankle, but states that standing for extended periods of time worsens the swelling/pain.  She used to be on mobic , she also has an appointment with rheumatology ,     The following portions of the patient's history were reviewed and updated as appropriate: allergies, current medications, past family history, past medical history, past social history, past surgical history and problem list.    Review of Systems   Constitutional: Negative for appetite change, fatigue and fever.   HENT: Negative for congestion, ear discharge, ear pain, sinus pressure and sore throat.    Eyes: Negative for pain and discharge.   Respiratory: Negative for cough, chest tightness, shortness of breath and wheezing.    Cardiovascular: Negative for chest pain, palpitations and leg swelling.   Gastrointestinal: Negative for abdominal pain, blood in stool, constipation, diarrhea and nausea.   Endocrine: Negative for cold intolerance and heat intolerance.   Genitourinary: Negative for dysuria, flank pain and frequency.   Musculoskeletal: Positive for arthralgias. Negative for back pain and joint swelling.   Skin: Negative for color change.   Allergic/Immunologic: Negative for environmental allergies and food allergies.   Neurological: Negative for dizziness, weakness, numbness and headaches.   Hematological: Negative for adenopathy. Does not bruise/bleed easily.   Psychiatric/Behavioral: Negative for behavioral problems and dysphoric mood. The patient is not nervous/anxious.          Current Outpatient  Prescriptions:   •  cetirizine (ZyrTEC) 10 MG tablet, Take 1 tablet by mouth every night., Disp: , Rfl:   •  diltiaZEM CD (CARDIZEM CD) 360 MG 24 hr capsule, Take 1 capsule by mouth Daily., Disp: 90 capsule, Rfl: 3  •  EPINEPHrine (EPIPEN 2-DAVID) 0.3 MG/0.3ML solution auto-injector injection, EpiPen 2-David 0.3 MG/0.3ML Injection Solution Auto-injector; Patient Sig: EpiPen 2-David 0.3 MG/0.3ML Injection Solution Auto-injector as directed; 1; 1; 28-Mar-2016; Active, Disp: , Rfl:   •  glucose blood test strip, 1 each by Other route 3 (three) times a day. Use as instructed, Disp: , Rfl:   •  Insulin Degludec (TRESIBA FLEXTOUCH) 200 UNIT/ML solution pen-injector, Inject 70 Units under the skin Every Night., Disp: 5 pen, Rfl: 11  •  Insulin Pen Needle 32G X 4 MM misc, Use three daily with Novolog and once daily with tresibia, Disp: 180 each, Rfl: 11  •  JUBLIA 10 % solution, APPLY ONCE A DAY TO AFFECTED TOENAIL AS DIRECTED, Disp: , Rfl: 6  •  losartan (COZAAR) 100 MG tablet, Take 1 tablet by mouth Daily., Disp: 90 tablet, Rfl: 3  •  mupirocin (BACTROBAN) 2 % cream, Apply  topically 3 (three) times a day., Disp: 15 g, Rfl: 6  •  NOVOLOG FLEXPEN 100 UNIT/ML solution pen-injector sc pen, Inject 40 Units under the skin 3 (Three) Times a Day With Meals., Disp: 5 pen, Rfl: 11  •  omeprazole (priLOSEC) 20 MG capsule, Take 1 capsule by mouth Daily., Disp: 90 capsule, Rfl: 3  •  tolterodine LA (DETROL LA) 4 MG 24 hr capsule, TAKE 1 CAPSULE BY MOUTH DAILY., Disp: 30 capsule, Rfl: 0  •  traMADol (ULTRAM) 50 MG tablet, Take 1 tablet by mouth Every 12 (Twelve) Hours As Needed for Moderate Pain (4-6) or Severe Pain (7-10)., Disp: 60 tablet, Rfl: 0    Objective     Blood pressure 153/83, pulse 78, temperature 98.1 °F (36.7 °C), temperature source Oral, resp. rate 16, weight (!) 360 lb (163 kg), SpO2 98 %.    Physical Exam   Constitutional: She is oriented to person, place, and time. She appears well-developed and well-nourished. No  distress.   obese   HENT:   Head: Normocephalic and atraumatic.   Right Ear: External ear normal.   Left Ear: External ear normal.   Nose: Nose normal.   Mouth/Throat: Oropharynx is clear and moist.   Eyes: Conjunctivae and EOM are normal. Pupils are equal, round, and reactive to light.   Neck: Neck supple. No thyromegaly present.   Cardiovascular: Normal rate, regular rhythm and normal heart sounds.    Pulmonary/Chest: Effort normal and breath sounds normal. No respiratory distress.   Abdominal: Soft. Bowel sounds are normal. She exhibits no distension. There is no tenderness. There is no rebound.   Musculoskeletal: Normal range of motion. She exhibits no edema.   Lymphadenopathy:     She has no cervical adenopathy.   Neurological: She is alert and oriented to person, place, and time.   No gross motor or sensory deficits   Skin: Skin is warm. She is not diaphoretic.   Psychiatric: She has a normal mood and affect.   Nursing note and vitals reviewed.      Results for orders placed or performed in visit on 08/08/17   Comprehensive Metabolic Panel   Result Value Ref Range    Glucose 207 (H) 74 - 98 mg/dL    BUN 15 7 - 20 mg/dL    Creatinine 0.60 0.60 - 1.30 mg/dL    eGFR Non African Am 108 >60 mL/min/1.73    eGFR African Am 131 >60 mL/min/1.73    BUN/Creatinine Ratio 25.0 (H) 7.1 - 23.5    Sodium 140 137 - 145 mmol/L    Potassium 4.5 3.5 - 5.1 mmol/L    Chloride 103 98 - 107 mmol/L    Total CO2 24.0 (L) 26.0 - 30.0 mmol/L    Calcium 11.0 (H) 8.4 - 10.2 mg/dL    Total Protein 7.2 6.3 - 8.2 g/dL    Albumin 4.30 3.50 - 5.00 g/dL    Globulin 2.9 gm/dL    A/G Ratio 1.5 1.0 - 2.0 g/dL    Total Bilirubin 0.7 0.2 - 1.3 mg/dL    Alkaline Phosphatase 125 38 - 126 U/L    AST (SGOT) 19 15 - 46 U/L    ALT (SGPT) 23 13 - 69 U/L   PTH, Intact   Result Value Ref Range    PTH, Intact 48 15 - 65 pg/mL   Calcium, Ionized   Result Value Ref Range    Ionized Calcium 5.9 (H) 4.5 - 5.6 mg/dL   CBC & Differential   Result Value Ref Range     WBC 11.63 (H) 4.80 - 10.80 10*3/mm3    RBC 5.69 (H) 4.20 - 5.40 10*6/mm3    Hemoglobin 15.6 12.0 - 16.0 g/dL    Hematocrit 47.5 (H) 37.0 - 47.0 %    MCV 83.5 81.0 - 99.0 fL    MCH 27.4 27.0 - 31.0 pg    MCHC 32.8 30.0 - 37.0 g/dL    RDW 13.3 11.5 - 14.5 %    Platelets 360 130 - 400 10*3/mm3    Neutrophil Rel % 63.5 37.0 - 80.0 %    Lymphocyte Rel % 29.3 10.0 - 50.0 %    Monocyte Rel % 4.5 0.0 - 12.0 %    Eosinophil Rel % 1.7 0.0 - 7.0 %    Basophil Rel % 0.5 0.0 - 2.5 %    Neutrophils Absolute 7.38 (H) 2.00 - 6.90 10*3/mm3    Lymphocytes Absolute 3.41 (H) 0.60 - 3.40 10*3/mm3    Monocytes Absolute 0.52 0.00 - 0.90 10*3/mm3    Eosinophils Absolute 0.20 0.00 - 0.70 10*3/mm3    Basophils Absolute 0.06 0.00 - 0.20 10*3/mm3    Immature Granulocyte Rel % 0.5 0.0 - 0.6 %    Immature Grans Absolute 0.06 0.00 - 0.06 10*3/mm3    nRBC 0.0 0.0 - 0.0 /100 WBC         Assessment/Plan   Azalea was seen today for ankle pain, hypertension and diabetes.    Diagnoses and all orders for this visit:    Benign essential hypertension    Uncontrolled type 2 diabetes mellitus without complication, with long-term current use of insulin    Acute left ankle pain  -     XR Ankle 3+ View Left      Plan:  1.Benign essential hypertension: Will continue  cardiazem to 360 mg qhs, Continue on losartan 100 mg po qd   low-sodium diet advise.  2. Diabetes mellitus : Continue with tresiba 70 u qhs  at bedtime and NovoLog 40 units 3 times a day., Diet and excise counseled  3. Left ankle  joint pain:  advised to follow up with  rheumatology for further recommendations , tramadol 50mg po q12 hrs prn , uds  And kodi to be obtained and narcotic contract signed , will get xray today           Shelby Hatfield MD

## 2017-08-30 ENCOUNTER — TELEPHONE (OUTPATIENT)
Dept: FAMILY MEDICINE CLINIC | Facility: CLINIC | Age: 47
End: 2017-08-30

## 2017-08-31 ENCOUNTER — TELEPHONE (OUTPATIENT)
Dept: FAMILY MEDICINE CLINIC | Facility: CLINIC | Age: 47
End: 2017-08-31

## 2017-09-06 ENCOUNTER — HOSPITAL ENCOUNTER (OUTPATIENT)
Dept: GENERAL RADIOLOGY | Facility: HOSPITAL | Age: 47
Discharge: HOME | End: 2017-09-06
Admitting: NURSE PRACTITIONER

## 2017-09-06 ENCOUNTER — TRANSCRIBE ORDERS (OUTPATIENT)
Dept: GENERAL RADIOLOGY | Facility: HOSPITAL | Age: 47
End: 2017-09-06

## 2017-09-06 DIAGNOSIS — R05.9 COUGH: ICD-10-CM

## 2017-09-06 DIAGNOSIS — R05.9 COUGH: Primary | ICD-10-CM

## 2017-09-06 PROCEDURE — 71020 HC CHEST PA AND LATERAL: CPT

## 2017-09-12 ENCOUNTER — OFFICE VISIT (OUTPATIENT)
Dept: FAMILY MEDICINE CLINIC | Facility: CLINIC | Age: 47
End: 2017-09-12

## 2017-09-12 VITALS
WEIGHT: 293 LBS | HEART RATE: 93 BPM | OXYGEN SATURATION: 95 % | SYSTOLIC BLOOD PRESSURE: 155 MMHG | BODY MASS INDEX: 56.7 KG/M2 | TEMPERATURE: 98.4 F | RESPIRATION RATE: 16 BRPM | DIASTOLIC BLOOD PRESSURE: 75 MMHG

## 2017-09-12 DIAGNOSIS — IMO0001 UNCONTROLLED TYPE 2 DIABETES MELLITUS WITHOUT COMPLICATION, WITH LONG-TERM CURRENT USE OF INSULIN: ICD-10-CM

## 2017-09-12 DIAGNOSIS — I10 BENIGN ESSENTIAL HYPERTENSION: Primary | ICD-10-CM

## 2017-09-12 DIAGNOSIS — G47.33 OBSTRUCTIVE SLEEP APNEA OF ADULT: ICD-10-CM

## 2017-09-12 DIAGNOSIS — R22.9 SINGLE SKIN NODULE: ICD-10-CM

## 2017-09-12 PROCEDURE — 99214 OFFICE O/P EST MOD 30 MIN: CPT | Performed by: INTERNAL MEDICINE

## 2017-09-12 RX ORDER — PREDNISONE 50 MG/1
TABLET ORAL
COMMUNITY
Start: 2017-09-08 | End: 2018-01-15

## 2017-09-12 NOTE — PROGRESS NOTES
Subjective   Azalea Mendoza is a 46 y.o. female.     Chief Complaint   Patient presents with   • Cyst   • Hypertension   • Diabetes       History of Present Illness   Patient is here to follow up  on the  blood pressure and    sugar  . The patient  is taking the blood pressure  medications as prescribed  ,  She is also here to follow up onher suagr and is taking her  Insulin , Patient complains of sleep apnea and would like a referral to pulmonology .Patient presents with a cyst on her right lower buttock that she first noticed approximately 5 days ago. She states that the area is not sore or discolored, only a hard spot under her skin.     The following portions of the patient's history were reviewed and updated as appropriate: allergies, current medications, past family history, past medical history, past social history, past surgical history and problem list.    Review of Systems   Constitutional: Negative for appetite change, fatigue and fever.   HENT: Negative for congestion, ear discharge, ear pain, sinus pressure and sore throat.    Eyes: Negative for pain and discharge.   Respiratory: Negative for cough, chest tightness, shortness of breath and wheezing.    Cardiovascular: Negative for chest pain, palpitations and leg swelling.   Gastrointestinal: Negative for abdominal pain, blood in stool, constipation, diarrhea and nausea.   Endocrine: Negative for cold intolerance and heat intolerance.   Genitourinary: Negative for dysuria, flank pain and frequency.   Musculoskeletal: Negative for back pain and joint swelling.   Skin: Negative for color change.        cyst   Allergic/Immunologic: Negative for environmental allergies and food allergies.   Neurological: Negative for dizziness, weakness, numbness and headaches.   Hematological: Negative for adenopathy. Does not bruise/bleed easily.   Psychiatric/Behavioral: Negative for behavioral problems and dysphoric mood. The patient is not nervous/anxious.           Current Outpatient Prescriptions:   •  cetirizine (ZyrTEC) 10 MG tablet, Take 1 tablet by mouth every night., Disp: , Rfl:   •  diltiaZEM CD (CARDIZEM CD) 360 MG 24 hr capsule, Take 1 capsule by mouth Daily., Disp: 90 capsule, Rfl: 3  •  EPINEPHrine (EPIPEN 2-DAVID) 0.3 MG/0.3ML solution auto-injector injection, EpiPen 2-David 0.3 MG/0.3ML Injection Solution Auto-injector; Patient Sig: EpiPen 2-David 0.3 MG/0.3ML Injection Solution Auto-injector as directed; 1; 1; 28-Mar-2016; Active, Disp: , Rfl:   •  glucose blood test strip, 1 each by Other route 3 (three) times a day. Use as instructed, Disp: , Rfl:   •  Insulin Degludec (TRESIBA FLEXTOUCH) 200 UNIT/ML solution pen-injector, Inject 70 Units under the skin Every Night., Disp: 5 pen, Rfl: 11  •  Insulin Pen Needle 32G X 4 MM misc, Use three daily with Novolog and once daily with tresibia, Disp: 180 each, Rfl: 11  •  JUBLIA 10 % solution, APPLY ONCE A DAY TO AFFECTED TOENAIL AS DIRECTED, Disp: , Rfl: 6  •  losartan (COZAAR) 100 MG tablet, Take 1 tablet by mouth Daily., Disp: 90 tablet, Rfl: 3  •  mupirocin (BACTROBAN) 2 % cream, Apply  topically 3 (three) times a day., Disp: 15 g, Rfl: 6  •  NOVOLOG FLEXPEN 100 UNIT/ML solution pen-injector sc pen, Inject 40 Units under the skin 3 (Three) Times a Day With Meals., Disp: 5 pen, Rfl: 11  •  omeprazole (priLOSEC) 20 MG capsule, Take 1 capsule by mouth Daily., Disp: 90 capsule, Rfl: 3  •  predniSONE (DELTASONE) 50 MG tablet, , Disp: , Rfl:   •  tolterodine LA (DETROL LA) 4 MG 24 hr capsule, TAKE 1 CAPSULE BY MOUTH DAILY., Disp: 30 capsule, Rfl: 0  •  traMADol (ULTRAM) 50 MG tablet, Take 1 tablet by mouth Every 12 (Twelve) Hours As Needed for Moderate Pain (4-6) or Severe Pain (7-10)., Disp: 60 tablet, Rfl: 0    Objective     Blood pressure 155/75, pulse 93, temperature 98.4 °F (36.9 °C), temperature source Oral, resp. rate 16, weight (!) 362 lb (164 kg), SpO2 95 %.    Physical Exam   Constitutional: She is oriented to  person, place, and time. She appears well-developed and well-nourished. No distress.   HENT:   Head: Normocephalic and atraumatic.   Right Ear: External ear normal.   Left Ear: External ear normal.   Nose: Nose normal.   Mouth/Throat: Oropharynx is clear and moist.   Eyes: Conjunctivae and EOM are normal. Pupils are equal, round, and reactive to light.   Neck: Neck supple. No thyromegaly present.   Cardiovascular: Normal rate, regular rhythm and normal heart sounds.    Pulmonary/Chest: Effort normal and breath sounds normal. No respiratory distress.   Abdominal: Soft. Bowel sounds are normal. She exhibits no distension. There is no tenderness. There is no rebound.   Musculoskeletal: Normal range of motion. She exhibits no edema.   Lymphadenopathy:     She has no cervical adenopathy.   Neurological: She is alert and oriented to person, place, and time.   No gross motor or sensory deficits   Skin: Skin is warm. She is not diaphoretic.   Right lower buttock - upper  thigh posterior ? lipoma   Psychiatric: She has a normal mood and affect.   Nursing note and vitals reviewed.      Results for orders placed or performed in visit on 08/08/17   Comprehensive Metabolic Panel   Result Value Ref Range    Glucose 207 (H) 74 - 98 mg/dL    BUN 15 7 - 20 mg/dL    Creatinine 0.60 0.60 - 1.30 mg/dL    eGFR Non African Am 108 >60 mL/min/1.73    eGFR African Am 131 >60 mL/min/1.73    BUN/Creatinine Ratio 25.0 (H) 7.1 - 23.5    Sodium 140 137 - 145 mmol/L    Potassium 4.5 3.5 - 5.1 mmol/L    Chloride 103 98 - 107 mmol/L    Total CO2 24.0 (L) 26.0 - 30.0 mmol/L    Calcium 11.0 (H) 8.4 - 10.2 mg/dL    Total Protein 7.2 6.3 - 8.2 g/dL    Albumin 4.30 3.50 - 5.00 g/dL    Globulin 2.9 gm/dL    A/G Ratio 1.5 1.0 - 2.0 g/dL    Total Bilirubin 0.7 0.2 - 1.3 mg/dL    Alkaline Phosphatase 125 38 - 126 U/L    AST (SGOT) 19 15 - 46 U/L    ALT (SGPT) 23 13 - 69 U/L   PTH, Intact   Result Value Ref Range    PTH, Intact 48 15 - 65 pg/mL   Calcium,  Ionized   Result Value Ref Range    Ionized Calcium 5.9 (H) 4.5 - 5.6 mg/dL   CBC & Differential   Result Value Ref Range    WBC 11.63 (H) 4.80 - 10.80 10*3/mm3    RBC 5.69 (H) 4.20 - 5.40 10*6/mm3    Hemoglobin 15.6 12.0 - 16.0 g/dL    Hematocrit 47.5 (H) 37.0 - 47.0 %    MCV 83.5 81.0 - 99.0 fL    MCH 27.4 27.0 - 31.0 pg    MCHC 32.8 30.0 - 37.0 g/dL    RDW 13.3 11.5 - 14.5 %    Platelets 360 130 - 400 10*3/mm3    Neutrophil Rel % 63.5 37.0 - 80.0 %    Lymphocyte Rel % 29.3 10.0 - 50.0 %    Monocyte Rel % 4.5 0.0 - 12.0 %    Eosinophil Rel % 1.7 0.0 - 7.0 %    Basophil Rel % 0.5 0.0 - 2.5 %    Neutrophils Absolute 7.38 (H) 2.00 - 6.90 10*3/mm3    Lymphocytes Absolute 3.41 (H) 0.60 - 3.40 10*3/mm3    Monocytes Absolute 0.52 0.00 - 0.90 10*3/mm3    Eosinophils Absolute 0.20 0.00 - 0.70 10*3/mm3    Basophils Absolute 0.06 0.00 - 0.20 10*3/mm3    Immature Granulocyte Rel % 0.5 0.0 - 0.6 %    Immature Grans Absolute 0.06 0.00 - 0.06 10*3/mm3    nRBC 0.0 0.0 - 0.0 /100 WBC         Assessment/Plan   Azalea was seen today for cyst, hypertension and diabetes.    Diagnoses and all orders for this visit:    Benign essential hypertension    Uncontrolled type 2 diabetes mellitus without complication, with long-term current use of insulin    Single skin nodule  -     US nonvascular extremity limited    Obstructive sleep apnea of adult  -     Ambulatory Referral to Pulmonology         Plan:  1.Benign essential hypertension: Will continue  cardiazem to 360 mg qhs, Continue on losartan 100 mg po qd   low-sodium diet advise.  2. Diabetes mellitus : Continue with tresiba 70 u qhs  at bedtime and NovoLog 40 units 3 times a day., Diet and excise counseled  3. cristobal : refer to pulmonology  4. Right skin nodule : will get usg , if needed ct         Shelby Hatfield MD

## 2017-09-14 RX ORDER — EPINEPHRINE 0.3 MG/.3ML
INJECTION INTRAMUSCULAR
Qty: 1 EACH | Refills: 1 | Status: SHIPPED | OUTPATIENT
Start: 2017-09-14 | End: 2018-04-25 | Stop reason: SDUPTHER

## 2017-09-14 RX ORDER — EPINEPHRINE 0.3 MG/.3ML
INJECTION INTRAMUSCULAR
Qty: 1 EACH | Refills: 1 | Status: SHIPPED | OUTPATIENT
Start: 2017-09-14 | End: 2018-01-15

## 2017-09-15 ENCOUNTER — HOSPITAL ENCOUNTER (OUTPATIENT)
Dept: ULTRASOUND IMAGING | Facility: HOSPITAL | Age: 47
Discharge: HOME | End: 2017-09-15
Admitting: INTERNAL MEDICINE

## 2017-09-15 DIAGNOSIS — D17.23 LIPOMA OF RIGHT LOWER EXTREMITY: Primary | ICD-10-CM

## 2017-09-15 PROCEDURE — 76882 US LMTD JT/FCL EVL NVASC XTR: CPT

## 2017-09-28 RX ORDER — TOLTERODINE 4 MG/1
CAPSULE, EXTENDED RELEASE ORAL
Qty: 30 CAPSULE | Refills: 3 | Status: SHIPPED | OUTPATIENT
Start: 2017-09-28 | End: 2017-12-05 | Stop reason: SDUPTHER

## 2017-09-29 ENCOUNTER — OFFICE VISIT (OUTPATIENT)
Dept: SURGERY | Facility: CLINIC | Age: 47
End: 2017-09-29

## 2017-09-29 VITALS
HEART RATE: 105 BPM | SYSTOLIC BLOOD PRESSURE: 160 MMHG | DIASTOLIC BLOOD PRESSURE: 78 MMHG | BODY MASS INDEX: 45.99 KG/M2 | WEIGHT: 293 LBS | HEIGHT: 67 IN | TEMPERATURE: 98.5 F | OXYGEN SATURATION: 98 %

## 2017-09-29 DIAGNOSIS — D17.1 LIPOMA OF TORSO: Primary | ICD-10-CM

## 2017-09-29 PROCEDURE — 99203 OFFICE O/P NEW LOW 30 MIN: CPT | Performed by: SURGERY

## 2017-09-29 RX ORDER — BENZONATATE 100 MG/1
CAPSULE ORAL
Refills: 0 | COMMUNITY
Start: 2017-09-08 | End: 2018-01-15

## 2017-09-29 RX ORDER — MELOXICAM 15 MG/1
TABLET ORAL
COMMUNITY
Start: 2017-09-27 | End: 2017-11-07 | Stop reason: ALTCHOICE

## 2017-09-29 NOTE — PROGRESS NOTES
Patient: Azalea Mendoza    YOB: 1970    Date: 09/29/2017    Primary Care Provider: Shelby Hatfield MD    Chief complaint:   Chief Complaint   Patient presents with   • Mass     Lipoma on right upper leg       Subjective .     History of present illness:  I saw the patient in office today for a lipoma on her right upper thigh/buttocks area. She says it has been there for 3 weeks now. She had an ultrasound done on 9/15/2017. She denies pain and tenderness but states if she sits to the side she can feel that is there. She denies redness, heat in the area or tenderness.Ultrasound indicates did dense fatty tissue, no definite mass or suspicious lesion.  Lesion is asymptomatic, found on incidental exam.  No redness or drainage.    Review of Systems   Constitutional: Negative for chills, fever and unexpected weight change.   HENT: Negative for hearing loss, trouble swallowing and voice change.    Eyes: Negative for visual disturbance.   Respiratory: Negative for apnea, cough, chest tightness, shortness of breath and wheezing.    Cardiovascular: Negative for chest pain, palpitations and leg swelling.   Gastrointestinal: Negative for abdominal distention, abdominal pain, anal bleeding, blood in stool, constipation, diarrhea, nausea, rectal pain and vomiting.   Endocrine: Negative for cold intolerance and heat intolerance.   Genitourinary: Negative for difficulty urinating, dysuria and flank pain.   Musculoskeletal: Negative for back pain and gait problem.   Skin: Negative for color change, rash and wound.   Neurological: Negative for dizziness, syncope, speech difficulty, weakness, light-headedness, numbness and headaches.   Hematological: Negative for adenopathy. Does not bruise/bleed easily.   Psychiatric/Behavioral: Negative for confusion. The patient is not nervous/anxious.        Allergies:  Allergies   Allergen Reactions   • Ciprofloxacin-Dexamethasone    • Fish Allergy Itching, Nausea Only and  Nausea And Vomiting   • Neomycin-Bacitracin Zn-Polymyx    • Penicillins        Medications:    Current Outpatient Prescriptions:   •  benzonatate (TESSALON) 100 MG capsule, TAKE ONE CAPSULE BY MOUTH 3 TIMES A DAY AS NEEDED FOR 5 DAYS, Disp: , Rfl: 0  •  cetirizine (ZyrTEC) 10 MG tablet, Take 1 tablet by mouth every night., Disp: , Rfl:   •  diltiaZEM CD (CARDIZEM CD) 360 MG 24 hr capsule, Take 1 capsule by mouth Daily., Disp: 90 capsule, Rfl: 3  •  EPIPEN 2-DAVID 0.3 MG/0.3ML solution auto-injector injection, USE AS INSTRUCTED, Disp: 1 each, Rfl: 1  •  EPIPEN 2-DAVID 0.3 MG/0.3ML solution auto-injector injection, USE AS INSTRUCTED, Disp: 1 each, Rfl: 1  •  glucose blood test strip, 1 each by Other route 3 (three) times a day. Use as instructed, Disp: , Rfl:   •  Insulin Degludec (TRESIBA FLEXTOUCH) 200 UNIT/ML solution pen-injector, Inject 70 Units under the skin Every Night., Disp: 5 pen, Rfl: 11  •  Insulin Pen Needle 32G X 4 MM misc, Use three daily with Novolog and once daily with tresibia, Disp: 180 each, Rfl: 11  •  JUBLIA 10 % solution, APPLY ONCE A DAY TO AFFECTED TOENAIL AS DIRECTED, Disp: , Rfl: 6  •  losartan (COZAAR) 100 MG tablet, Take 1 tablet by mouth Daily., Disp: 90 tablet, Rfl: 3  •  meloxicam (MOBIC) 15 MG tablet, , Disp: , Rfl:   •  mupirocin (BACTROBAN) 2 % cream, Apply  topically 3 (three) times a day., Disp: 15 g, Rfl: 6  •  NOVOLOG FLEXPEN 100 UNIT/ML solution pen-injector sc pen, Inject 40 Units under the skin 3 (Three) Times a Day With Meals., Disp: 5 pen, Rfl: 11  •  omeprazole (priLOSEC) 20 MG capsule, Take 1 capsule by mouth Daily., Disp: 90 capsule, Rfl: 3  •  predniSONE (DELTASONE) 50 MG tablet, , Disp: , Rfl:   •  tolterodine LA (DETROL LA) 4 MG 24 hr capsule, TAKE 1 CAPSULE BY MOUTH DAILY., Disp: 30 capsule, Rfl: 3  •  traMADol (ULTRAM) 50 MG tablet, Take 1 tablet by mouth Every 12 (Twelve) Hours As Needed for Moderate Pain (4-6) or Severe Pain (7-10)., Disp: 60 tablet, Rfl:  "0    History\"  Past Medical History:   Diagnosis Date   • Abnormal weight gain    • Acute bronchitis with bronchospasm    • Acute maxillary sinusitis    • Acute serous otitis media    • Acute suppurative otitis media of both ears without spontaneous rupture of tympanic membranes    • Allergic rhinitis due to pollen    • Back pain, lumbosacral    • Cervicalgia    • Cyst of breast    • Diabetes mellitus    • Esophagitis    • H/O cystoscopy     diagnostic cystoscopy   • Hypertension    • IBS (irritable bowel syndrome)    • Lump or mass in breast    • Muscle spasm    • Ovarian cancer    • Right knee pain    • Shingles    • Sleep apnea    • UTI (urinary tract infection)        Past Surgical History:   Procedure Laterality Date   • COLONOSCOPY     • CYST REMOVAL     • CYSTOSCOPY     • TOTAL ABDOMINAL HYSTERECTOMY WITH SALPINGO OOPHORECTOMY     • TUMOR REMOVAL         Family History   Problem Relation Age of Onset   • Hypertension Mother    • Diabetes Mother      type 2   • Arthritis Father    • Hypertension Father    • Hyperlipidemia Father    • Diabetes Father      type 2   • Cancer Other        Social History   Substance Use Topics   • Smoking status: Never Smoker   • Smokeless tobacco: Never Used   • Alcohol use No        Objective     Vital Signs:   /78 (BP Location: Left arm)  Pulse 105  Temp 98.5 °F (36.9 °C) (Temporal Artery )   Ht 67\" (170.2 cm)  Wt (!) 369 lb (167 kg)  SpO2 98%  BMI 57.79 kg/m2    Physical Exam:   General Appearance:    Alert, cooperative, in no acute distress   Head:    Normocephalic, without obvious abnormality, atraumatic   Eyes:            Lids and lashes normal, conjunctivae and sclerae normal, no   icterus, no pallor, corneas clear, PERRLA   Ears:    Ears appear intact with no abnormalities noted   Throat:   No oral lesions, no thrush, oral mucosa moist   Neck:   No adenopathy, supple, trachea midline, no thyromegaly, no   carotid bruit, no JVD   Lungs:     Clear to " auscultation,respirations regular, even and                  unlabored    Heart:    Regular rhythm and normal rate, normal S1 and S2, no            murmur, no gallop, no rub, no click   Chest Wall:    No abnormalities observed   Abdomen:     Normal bowel sounds, no masses, no organomegaly, soft        non-tender, non-distended, no guarding, no rebound                tenderness   Extremities:   Moves all extremities well, no edema, no cyanosis, no             Redness. Large 15 cm lipomatous mass right hip, posteriorly.     Pulses:   Pulses palpable and equal bilaterally   Skin:   No bleeding, bruising or rash   Lymph nodes:   No palpable adenopathy   Neurologic:   Cranial nerves 2 - 12 grossly intact, sensation intact, DTR       present and equal bilaterally     Results Review:   I reviewed the patient's new clinical results.    Assessment/Plan     1. Lipoma of torso        Patient reassured, follow-up in one year for repeat ultrasound and examination.  She would like to hold off on surgery at this time and I'm in agreement.    I discussed the patients findings and my recommendations with patient    Review of Systems was reviewed and confirmed as accurate today.    Electronically signed by Kang Polk MD  09/29/17    Scribed for Kang Polk MD by Lora Daly. 9/29/2017  2:23 PM    .

## 2017-11-07 ENCOUNTER — OFFICE VISIT (OUTPATIENT)
Dept: PULMONOLOGY | Facility: CLINIC | Age: 47
End: 2017-11-07

## 2017-11-07 VITALS
WEIGHT: 293 LBS | DIASTOLIC BLOOD PRESSURE: 90 MMHG | BODY MASS INDEX: 45.99 KG/M2 | HEART RATE: 88 BPM | OXYGEN SATURATION: 97 % | HEIGHT: 67 IN | SYSTOLIC BLOOD PRESSURE: 144 MMHG | RESPIRATION RATE: 17 BRPM

## 2017-11-07 DIAGNOSIS — E66.01 MORBID OBESITY, UNSPECIFIED OBESITY TYPE (HCC): ICD-10-CM

## 2017-11-07 DIAGNOSIS — G47.33 OBSTRUCTIVE SLEEP APNEA: Primary | ICD-10-CM

## 2017-11-07 PROBLEM — I10 HYPERTENSION: Status: ACTIVE | Noted: 2017-11-07

## 2017-11-07 PROBLEM — M54.2 NECK PAIN: Status: ACTIVE | Noted: 2017-11-07

## 2017-11-07 PROBLEM — M54.9 BACK PAIN: Status: ACTIVE | Noted: 2017-11-07

## 2017-11-07 PROBLEM — H65.00 ACUTE SEROUS OTITIS MEDIA: Status: ACTIVE | Noted: 2017-11-07

## 2017-11-07 PROBLEM — R25.2 SPASM: Status: ACTIVE | Noted: 2017-11-07

## 2017-11-07 PROBLEM — R10.9 ABDOMINAL PAIN: Status: ACTIVE | Noted: 2017-11-07

## 2017-11-07 PROBLEM — M25.549 HAND JOINT PAIN: Status: ACTIVE | Noted: 2017-11-07

## 2017-11-07 PROBLEM — R21 RASH: Status: ACTIVE | Noted: 2017-11-07

## 2017-11-07 PROBLEM — R80.9 PROTEINURIA: Status: ACTIVE | Noted: 2017-11-07

## 2017-11-07 PROBLEM — E83.52 HYPERCALCEMIA: Status: ACTIVE | Noted: 2017-11-07

## 2017-11-07 PROBLEM — L72.9 CYST OF SKIN: Status: ACTIVE | Noted: 2017-11-07

## 2017-11-07 PROBLEM — R00.2 PALPITATIONS: Status: ACTIVE | Noted: 2017-11-07

## 2017-11-07 PROBLEM — H66.009 ACUTE SUPPURATIVE OTITIS MEDIA WITHOUT SPONTANEOUS RUPTURE OF EAR DRUM: Status: ACTIVE | Noted: 2017-11-07

## 2017-11-07 PROBLEM — B02.9 HERPES ZOSTER: Status: ACTIVE | Noted: 2017-11-07

## 2017-11-07 PROBLEM — J01.00 ACUTE MAXILLARY SINUSITIS: Status: ACTIVE | Noted: 2017-11-07

## 2017-11-07 PROBLEM — N39.0 URINARY TRACT INFECTION: Status: ACTIVE | Noted: 2017-11-07

## 2017-11-07 PROBLEM — R31.9 HEMATURIA: Status: ACTIVE | Noted: 2017-11-07

## 2017-11-07 PROBLEM — M79.609 PAIN IN EXTREMITY: Status: ACTIVE | Noted: 2017-11-07

## 2017-11-07 PROBLEM — J20.9 ACUTE BRONCHITIS WITH BRONCHOSPASM: Status: ACTIVE | Noted: 2017-11-07

## 2017-11-07 PROBLEM — R06.02 SHORTNESS OF BREATH: Status: ACTIVE | Noted: 2017-11-07

## 2017-11-07 PROBLEM — N63.0 MASS OF BREAST: Status: ACTIVE | Noted: 2017-11-07

## 2017-11-07 PROBLEM — M25.569 KNEE PAIN: Status: ACTIVE | Noted: 2017-11-07

## 2017-11-07 PROCEDURE — 99244 OFF/OP CNSLTJ NEW/EST MOD 40: CPT | Performed by: INTERNAL MEDICINE

## 2017-11-07 RX ORDER — DEXTROMETHORPHAN HYDROBROMIDE AND PROMETHAZINE HYDROCHLORIDE 15; 6.25 MG/5ML; MG/5ML
SYRUP ORAL
Refills: 0 | COMMUNITY
Start: 2017-10-21 | End: 2018-01-15

## 2017-11-07 RX ORDER — DULOXETIN HYDROCHLORIDE 20 MG/1
CAPSULE, DELAYED RELEASE ORAL
Refills: 0 | COMMUNITY
Start: 2017-10-11 | End: 2017-11-07 | Stop reason: ALTCHOICE

## 2017-11-07 RX ORDER — BACLOFEN 10 MG/1
TABLET ORAL
Refills: 4 | COMMUNITY
Start: 2017-10-01 | End: 2018-01-15

## 2017-11-07 NOTE — PROGRESS NOTES
CONSULT NOTE    Requested by: Shelby Hatfield MD      Chief Complaint   Patient presents with   • Consult   • Sleeping Problem       Subjective   Azalea Mendoza is a 47 y.o. female.     History of Present Illness   Patient comes in today for consultation because of obstructive sleep apnea.    The patient says that she was diagnosed with sleep apnea more than 8 years ago and has been using his CPAP device since.  The patient has had nasal pillows but they have not been changed over the past 1 year and now that her leaking almost regularly.    The patient says that without the CPAP device his symptoms worsened to the point where she has a tendency to fall asleep while watching TV, reading a book but even after lunch.    With the CPAP however, she feels well rested and does not have any fatigue or tiredness in the morning.    She does have hypertension and diabetes.    The following portions of the patient's history were reviewed and updated as appropriate: allergies, current medications, past family history, past medical history, past social history and past surgical history.    Review of Systems   Constitutional: Negative for chills, fatigue and unexpected weight change.   HENT: Positive for ear pain, postnasal drip, rhinorrhea, sinus pressure, sneezing and sore throat.    Respiratory: Negative for cough, chest tightness, shortness of breath and wheezing.    Cardiovascular: Positive for palpitations. Negative for chest pain and leg swelling.   Psychiatric/Behavioral: Positive for sleep disturbance.   All other systems reviewed and are negative.      Past Medical History:   Diagnosis Date   • Abnormal weight gain    • Acute bronchitis with bronchospasm    • Acute maxillary sinusitis    • Acute serous otitis media    • Acute suppurative otitis media of both ears without spontaneous rupture of tympanic membranes    • Allergic rhinitis due to pollen    • Back pain, lumbosacral    • Cervicalgia    • Cyst of breast   "  • Diabetes mellitus    • Esophagitis    • H/O cystoscopy     diagnostic cystoscopy   • Hypertension    • IBS (irritable bowel syndrome)    • Lump or mass in breast    • Muscle spasm    • Ovarian cancer    • Right knee pain    • Shingles    • Sleep apnea    • UTI (urinary tract infection)        Social History   Substance Use Topics   • Smoking status: Never Smoker   • Smokeless tobacco: Never Used   • Alcohol use No         Objective   Visit Vitals   • /90   • Pulse 88   • Resp 17   • Ht 67\" (170.2 cm)   • Wt (!) 371 lb (168 kg)   • SpO2 97%   • BMI 58.11 kg/m2       Physical Exam   Constitutional: She is oriented to person, place, and time. She appears well-developed and well-nourished.   HENT:   Head: Atraumatic.   Crowded Oropharynx.    Eyes: EOM are normal. Pupils are equal, round, and reactive to light.   Neck: No JVD present. No tracheal deviation present. No thyromegaly present.   Increased adipose tissue.    Cardiovascular: Normal rate and regular rhythm.    Pulmonary/Chest: Effort normal and breath sounds normal. No respiratory distress. She has no wheezes.   Musculoskeletal: Normal range of motion. She exhibits no edema.   Neurological: She is alert and oriented to person, place, and time.   Skin: Skin is warm and dry.   Psychiatric: She has a normal mood and affect. Her behavior is normal.   Vitals reviewed.      Assessment/Plan   Azalea was seen today for consult and sleeping problem.    Diagnoses and all orders for this visit:    Obstructive sleep apnea  -     BIPAP / CPAP Adjustment    Morbid obesity, unspecified obesity type         Return in about 3 months (around 2/7/2018) for Recheck, For Shantel.    DISCUSSION(if any):  Laboratory workup was also reviewed which showed CO2 level of 24. I'll try to obtain her previous sleep studies    I reviewed her compliance for the past 1 month and it suggests that she is on AutoPap at a pressure of 10/15.  She is clearly very compliant with CPAP but is " also very significantly throughout the night on her nightly basis.    ===========================  ===========================    We will order new supplies for her and review her compliance after 2-3 months.    Based on her symptoms further recommendations, which may include a titration study, can be considered and made.    If the patient's supplies cannot be provided, then only option would be to repeat a sleep study and this can be performed at home.    I encouraged her to stay compliant with the AutoPap device, that seems to be at a pressure of 10/15.    Weight loss was discussed.      Dictated utilizing Dragon dictation.    This document was electronically signed by Rafy Schneider MD November 7, 2017  11:21 AM

## 2017-11-16 ENCOUNTER — TELEPHONE (OUTPATIENT)
Dept: PULMONOLOGY | Facility: CLINIC | Age: 47
End: 2017-11-16

## 2017-11-16 NOTE — TELEPHONE ENCOUNTER
CALLED  AND THEY NEEDED NOTES AND SLEEP STUDY ON HER. I SENT OVER THE NOTES BUT WE DID NOT HAVE THE SLEEP STUDY, I CALLED HER AND LET HER KNOW THAT WE NEEDED TO GET HER SLEEP STUDY. SHE SAID THAT SHE WILL GO TO GET IT FROM DR. CORNELIUS'S OFFICE.      ----- Message from Marie Rodriguez sent at 11/15/2017 10:25 AM EST -----  Contact: [phone]  Order was sent to  and [facility] and [facility] told her that they are waiting on us, please call her and let her know what she needs to do.

## 2017-11-22 ENCOUNTER — LAB (OUTPATIENT)
Dept: FAMILY MEDICINE CLINIC | Facility: CLINIC | Age: 47
End: 2017-11-22

## 2017-11-22 DIAGNOSIS — E78.2 MIXED HYPERLIPIDEMIA: ICD-10-CM

## 2017-11-22 DIAGNOSIS — IMO0002 TYPE II DIABETES MELLITUS, UNCONTROLLED: ICD-10-CM

## 2017-11-22 DIAGNOSIS — I10 BENIGN ESSENTIAL HYPERTENSION: ICD-10-CM

## 2017-11-23 LAB
ALBUMIN SERPL-MCNC: 4.1 G/DL (ref 3.5–5)
ALBUMIN/CREAT UR: 7.2 MG/G CREAT (ref 0–30)
ALBUMIN/GLOB SERPL: 1.5 G/DL (ref 1–2)
ALP SERPL-CCNC: 112 U/L (ref 38–126)
ALT SERPL-CCNC: 22 U/L (ref 13–69)
AST SERPL-CCNC: 16 U/L (ref 15–46)
BASOPHILS # BLD AUTO: 0.05 10*3/MM3 (ref 0–0.2)
BASOPHILS NFR BLD AUTO: 0.4 % (ref 0–2.5)
BILIRUB SERPL-MCNC: 0.6 MG/DL (ref 0.2–1.3)
BUN SERPL-MCNC: 14 MG/DL (ref 7–20)
BUN/CREAT SERPL: 23.3 (ref 7.1–23.5)
CALCIUM SERPL-MCNC: 10.9 MG/DL (ref 8.4–10.2)
CHLORIDE SERPL-SCNC: 104 MMOL/L (ref 98–107)
CHOLEST SERPL-MCNC: 193 MG/DL (ref 0–199)
CO2 SERPL-SCNC: 26 MMOL/L (ref 26–30)
CREAT SERPL-MCNC: 0.6 MG/DL (ref 0.6–1.3)
CREAT UR-MCNC: 47.1 MG/DL
EOSINOPHIL # BLD AUTO: 0.44 10*3/MM3 (ref 0–0.7)
EOSINOPHIL NFR BLD AUTO: 3.9 % (ref 0–7)
ERYTHROCYTE [DISTWIDTH] IN BLOOD BY AUTOMATED COUNT: 13.9 % (ref 11.5–14.5)
GFR SERPLBLD CREATININE-BSD FMLA CKD-EPI: 108 ML/MIN/1.73
GFR SERPLBLD CREATININE-BSD FMLA CKD-EPI: 130 ML/MIN/1.73
GLOBULIN SER CALC-MCNC: 2.8 GM/DL
GLUCOSE SERPL-MCNC: 198 MG/DL (ref 74–98)
HBA1C MFR BLD: 9.3 %
HCT VFR BLD AUTO: 44.4 % (ref 37–47)
HDLC SERPL-MCNC: 41 MG/DL (ref 40–60)
HGB BLD-MCNC: 14 G/DL (ref 12–16)
IMM GRANULOCYTES # BLD: 0.04 10*3/MM3 (ref 0–0.06)
IMM GRANULOCYTES NFR BLD: 0.4 % (ref 0–0.6)
LDLC SERPL CALC-MCNC: 125 MG/DL (ref 0–99)
LYMPHOCYTES # BLD AUTO: 3.69 10*3/MM3 (ref 0.6–3.4)
LYMPHOCYTES NFR BLD AUTO: 32.5 % (ref 10–50)
MCH RBC QN AUTO: 26.5 PG (ref 27–31)
MCHC RBC AUTO-ENTMCNC: 31.5 G/DL (ref 30–37)
MCV RBC AUTO: 84.1 FL (ref 81–99)
MICROALBUMIN UR-MCNC: 3.4 UG/ML
MONOCYTES # BLD AUTO: 0.49 10*3/MM3 (ref 0–0.9)
MONOCYTES NFR BLD AUTO: 4.3 % (ref 0–12)
NEUTROPHILS # BLD AUTO: 6.66 10*3/MM3 (ref 2–6.9)
NEUTROPHILS NFR BLD AUTO: 58.5 % (ref 37–80)
NRBC BLD AUTO-RTO: 0 /100 WBC (ref 0–0)
PLATELET # BLD AUTO: 356 10*3/MM3 (ref 130–400)
POTASSIUM SERPL-SCNC: 4.3 MMOL/L (ref 3.5–5.1)
PROT SERPL-MCNC: 6.9 G/DL (ref 6.3–8.2)
RBC # BLD AUTO: 5.28 10*6/MM3 (ref 4.2–5.4)
SODIUM SERPL-SCNC: 141 MMOL/L (ref 137–145)
TRIGL SERPL-MCNC: 137 MG/DL
VLDLC SERPL CALC-MCNC: 27.4 MG/DL
WBC # BLD AUTO: 11.37 10*3/MM3 (ref 4.8–10.8)

## 2017-11-28 ENCOUNTER — TELEPHONE (OUTPATIENT)
Dept: FAMILY MEDICINE CLINIC | Facility: CLINIC | Age: 47
End: 2017-11-28

## 2017-11-28 NOTE — TELEPHONE ENCOUNTER
Patient called to get an appointment and can't be seen until 12/20/17 and she needs all of her medication sent to [facility] in a 90 day supply.

## 2017-11-29 NOTE — TELEPHONE ENCOUNTER
Spoke with pharmacist at [facility], all of their systems were down but she will check when they come up and refill patient's prescriptions which are mostly likely on hold.

## 2017-12-05 RX ORDER — BACLOFEN 10 MG/1
TABLET ORAL
Refills: 4 | Status: CANCELLED | OUTPATIENT
Start: 2017-12-05

## 2017-12-05 RX ORDER — OMEPRAZOLE 20 MG/1
20 CAPSULE, DELAYED RELEASE ORAL DAILY
Qty: 90 CAPSULE | Refills: 3 | Status: SHIPPED | OUTPATIENT
Start: 2017-12-05 | End: 2019-02-24 | Stop reason: SDUPTHER

## 2017-12-05 RX ORDER — DILTIAZEM HYDROCHLORIDE 360 MG/1
360 CAPSULE, EXTENDED RELEASE ORAL DAILY
Qty: 90 CAPSULE | Refills: 3 | Status: SHIPPED | OUTPATIENT
Start: 2017-12-05 | End: 2018-08-21

## 2017-12-05 RX ORDER — MUPIROCIN CALCIUM 20 MG/G
CREAM TOPICAL 3 TIMES DAILY
Qty: 15 G | Refills: 6 | Status: SHIPPED | OUTPATIENT
Start: 2017-12-05 | End: 2019-09-21 | Stop reason: HOSPADM

## 2017-12-05 RX ORDER — CETIRIZINE HYDROCHLORIDE 10 MG/1
10 TABLET ORAL NIGHTLY
Qty: 90 TABLET | Refills: 3 | Status: SHIPPED | OUTPATIENT
Start: 2017-12-05

## 2017-12-05 RX ORDER — TOLTERODINE 4 MG/1
4 CAPSULE, EXTENDED RELEASE ORAL DAILY
Qty: 90 CAPSULE | Refills: 1 | Status: SHIPPED | OUTPATIENT
Start: 2017-12-05 | End: 2018-01-15

## 2017-12-05 RX ORDER — INSULIN ASPART 100 [IU]/ML
40 INJECTION, SOLUTION INTRAVENOUS; SUBCUTANEOUS
Qty: 5 PEN | Refills: 11 | Status: SHIPPED | OUTPATIENT
Start: 2017-12-05 | End: 2018-05-08

## 2017-12-05 RX ORDER — LOSARTAN POTASSIUM 100 MG/1
100 TABLET ORAL DAILY
Qty: 90 TABLET | Refills: 3 | Status: SHIPPED | OUTPATIENT
Start: 2017-12-05

## 2017-12-05 NOTE — TELEPHONE ENCOUNTER
Patient requests refills on meds.  I resent what she needed except for the Baclofen.  Do you fill that?

## 2017-12-11 ENCOUNTER — OFFICE VISIT (OUTPATIENT)
Dept: SURGERY | Facility: CLINIC | Age: 47
End: 2017-12-11

## 2017-12-11 VITALS
HEART RATE: 100 BPM | OXYGEN SATURATION: 97 % | SYSTOLIC BLOOD PRESSURE: 166 MMHG | DIASTOLIC BLOOD PRESSURE: 94 MMHG | TEMPERATURE: 99.5 F | BODY MASS INDEX: 45.99 KG/M2 | WEIGHT: 293 LBS | HEIGHT: 67 IN

## 2017-12-11 DIAGNOSIS — D17.23 LIPOMA OF RIGHT LOWER EXTREMITY: Primary | ICD-10-CM

## 2017-12-11 DIAGNOSIS — E04.1 THYROID NODULE: ICD-10-CM

## 2017-12-11 DIAGNOSIS — E04.1 THYROID NODULE: Primary | ICD-10-CM

## 2017-12-11 PROCEDURE — 99213 OFFICE O/P EST LOW 20 MIN: CPT | Performed by: SURGERY

## 2017-12-11 NOTE — PROGRESS NOTES
Patient: Azalea Mendoza    YOB: 1970    Date: 12/11/2017    Primary Care Provider: Shelby Hatfield MD    Chief complaint:   Chief Complaint   Patient presents with   • Thyroid Problem     1 year follow up thyroid nodule   • Mass     Right upper leg lipoma       Subjective .     History of present illness:  The patient is here today for a 1 year follow up left thyroid nodule and a follow up of a right upper leg lipoma.  The patient has had a prior negative FNA of the thyroid nodule. She denies difficulty swallowing, neck mass or a family history of thyroid disease/cancer.  She does state that the upper leg lipoma seems to have increased in size since last visit.  She denies pain.  Ultrasound today indicates no significant change in thyroid nodule.  Lipoma with minimal symptoms.    The following portions of the patient's history were reviewed and updated as appropriate: allergies, current medications, past family history, past medical history, past social history, past surgical history and problem list.      Review of Systems   Constitutional: Negative for chills, fever and unexpected weight change.   HENT: Negative for hearing loss, trouble swallowing and voice change.    Eyes: Negative for visual disturbance.   Respiratory: Negative for apnea, cough, chest tightness, shortness of breath and wheezing.    Cardiovascular: Negative for chest pain, palpitations and leg swelling.   Gastrointestinal: Negative for abdominal distention, abdominal pain, anal bleeding, blood in stool, constipation, diarrhea, nausea, rectal pain and vomiting.   Endocrine: Negative for cold intolerance and heat intolerance.   Genitourinary: Negative for difficulty urinating, dysuria and flank pain.   Musculoskeletal: Negative for back pain, gait problem and neck pain.   Skin: Negative for color change, rash and wound.   Neurological: Negative for dizziness, syncope, speech difficulty, weakness, light-headedness, numbness and  headaches.   Hematological: Negative for adenopathy. Does not bruise/bleed easily.   Psychiatric/Behavioral: Negative for confusion. The patient is not nervous/anxious.        Allergies:  Allergies   Allergen Reactions   • Ciprofloxacin-Dexamethasone    • Fish Allergy Itching, Nausea Only and Nausea And Vomiting   • Neomycin-Bacitracin Zn-Polymyx    • Penicillins        Medications:    Current Outpatient Prescriptions:   •  baclofen (LIORESAL) 10 MG tablet, TAKE 1 TABLET BY MOUTH DAILY, Disp: , Rfl: 4  •  benzonatate (TESSALON) 100 MG capsule, TAKE ONE CAPSULE BY MOUTH 3 TIMES A DAY AS NEEDED FOR 5 DAYS, Disp: , Rfl: 0  •  cetirizine (zyrTEC) 10 MG tablet, Take 1 tablet (10 mg total) by mouth Every Night, Disp: 90 tablet, Rfl: 3  •  diltiaZEM CD (CARDIZEM CD) 360 MG 24 hr capsule, Take 1 capsule (360 mg total) by mouth Daily, Disp: 90 capsule, Rfl: 3  •  EPIPEN 2-DAVID 0.3 MG/0.3ML solution auto-injector injection, USE AS INSTRUCTED, Disp: 1 each, Rfl: 1  •  EPIPEN 2-DAIVD 0.3 MG/0.3ML solution auto-injector injection, USE AS INSTRUCTED, Disp: 1 each, Rfl: 1  •  glucose blood test strip, 1 each by Other route 3 (Three) Times a Day Use as instructed, Disp: 300 each, Rfl: 3  •  Insulin Degludec (TRESIBA FLEXTOUCH) 200 UNIT/ML solution pen-injector, Inject 70 Units under the skin Every Night, Disp: 5 pen, Rfl: 11  •  Insulin Pen Needle 32G X 4 MM misc, Use three daily with Novolog and once daily with tresibia, Disp: 180 each, Rfl: 11  •  JUBLIA 10 % solution, APPLY ONCE A DAY TO AFFECTED TOENAIL AS DIRECTED, Disp: , Rfl: 6  •  losartan (COZAAR) 100 MG tablet, Take 1 tablet (100 mg total) by mouth Daily, Disp: 90 tablet, Rfl: 3  •  mupirocin (BACTROBAN) 2 % cream, Apply topically 3 (Three) Times a Day, Disp: 15 g, Rfl: 6  •  NOVOLOG FLEXPEN 100 UNIT/ML solution pen-injector sc pen, Inject 40 Units under the skin 3 (Three) Times a Day With Meals, Disp: 5 pen, Rfl: 11  •  omeprazole (priLOSEC) 20 MG capsule, Take 1 capsule  "(20 mg total) by mouth Daily, Disp: 90 capsule, Rfl: 3  •  predniSONE (DELTASONE) 50 MG tablet, , Disp: , Rfl:   •  promethazine-dextromethorphan (PROMETHAZINE-DM) 6.25-15 MG/5ML syrup, TAKE 1 OR 2 TEASPOONSFUL AT BEDTIME AS NEEDED FOR COUGH FOR 5 DAYS, Disp: , Rfl: 0  •  tolterodine LA (DETROL LA) 4 MG 24 hr capsule, Take 1 capsule (4 mg total) by mouth Daily, Disp: 90 capsule, Rfl: 1  •  traMADol (ULTRAM) 50 MG tablet, Take 1 tablet by mouth Every 12 (Twelve) Hours As Needed for Moderate Pain (4-6) or Severe Pain (7-10)., Disp: 60 tablet, Rfl: 0    History\"  Past Medical History:   Diagnosis Date   • Abnormal weight gain    • Acute bronchitis with bronchospasm    • Acute maxillary sinusitis    • Acute serous otitis media    • Acute suppurative otitis media of both ears without spontaneous rupture of tympanic membranes    • Allergic rhinitis due to pollen    • Back pain, lumbosacral    • Cervicalgia    • Cyst of breast    • Diabetes mellitus    • Esophagitis    • H/O cystoscopy     diagnostic cystoscopy   • Hypertension    • IBS (irritable bowel syndrome)    • Lump or mass in breast    • Muscle spasm    • Ovarian cancer    • Right knee pain    • Shingles    • Sleep apnea    • UTI (urinary tract infection)        Past Surgical History:   Procedure Laterality Date   • COLONOSCOPY     • CYST REMOVAL     • CYSTOSCOPY     • TOTAL ABDOMINAL HYSTERECTOMY WITH SALPINGO OOPHORECTOMY     • TUMOR REMOVAL         Family History   Problem Relation Age of Onset   • Hypertension Mother    • Diabetes Mother      type 2   • Arthritis Father    • Hypertension Father    • Hyperlipidemia Father    • Diabetes Father      type 2   • Cancer Other        Social History   Substance Use Topics   • Smoking status: Never Smoker   • Smokeless tobacco: Never Used   • Alcohol use No        Objective     Vital Signs:   /94 (BP Location: Left arm)  Pulse 100  Temp 99.5 °F (37.5 °C) (Temporal Artery )   Ht 170.2 cm (67\")  Wt (!) 169 kg " (372 lb)  SpO2 97%  BMI 58.26 kg/m2    Physical Exam:   General Appearance:    Alert, cooperative, in no acute distress   Head:    Normocephalic, without obvious abnormality, atraumatic   Eyes:            Lids and lashes normal, conjunctivae and sclerae normal, no   icterus, no pallor, corneas clear, PERRLA   Ears:    Ears appear intact with no abnormalities noted   Throat:   No oral lesions, no thrush, oral mucosa moist   Neck:   No adenopathy, supple, trachea midline, no thyromegaly, no   carotid bruit, no JVD   Lungs:     Clear to auscultation,respirations regular, even and                  unlabored    Heart:    Regular rhythm and normal rate, normal S1 and S2, no            murmur, no gallop, no rub, no click   Chest Wall:    No abnormalities observed   Abdomen:     Normal bowel sounds, no masses, no organomegaly, soft        non-tender, non-distended, no guarding, no rebound                tenderness   Extremities:   Moves all extremities well, no edema, no cyanosis, no             Redness.  A centimeter lipoma right upper posterior thigh.  Mobile and well-circumscribed.  Nontender.     Pulses:   Pulses palpable and equal bilaterally   Skin:   No bleeding, bruising or rash   Lymph nodes:   No palpable adenopathy   Neurologic:   Cranial nerves 2 - 12 grossly intact, sensation intact, DTR       present and equal bilaterally     Results Review:   I reviewed the patient's new clinical results.    Assessment/Plan     1. Lipoma of right lower extremity    2. Thyroid nodule        Patient reassured, repeat thyroid ultrasound in 1 year.  Also we'll reexamine lipoma in 1 year's well.  If lipoma increase in size, causes numbness or pain, she'll follow up sooner for excision.    I discussed the patients findings and my recommendations with patient    Review of Systems was reviewed and confirmed as accurate today.    Electronically signed by Kang Polk MD  12/11/17      .  Scribed for Kang Polk MD by  Rachel Pal. 12/11/2017  1:55 PM

## 2017-12-26 RX ORDER — DILTIAZEM HYDROCHLORIDE 360 MG/1
CAPSULE, EXTENDED RELEASE ORAL
Qty: 30 CAPSULE | Refills: 2 | OUTPATIENT
Start: 2017-12-26

## 2018-01-15 ENCOUNTER — OFFICE VISIT (OUTPATIENT)
Dept: INTERNAL MEDICINE | Facility: CLINIC | Age: 48
End: 2018-01-15

## 2018-01-15 VITALS
DIASTOLIC BLOOD PRESSURE: 91 MMHG | WEIGHT: 293 LBS | SYSTOLIC BLOOD PRESSURE: 140 MMHG | BODY MASS INDEX: 45.99 KG/M2 | TEMPERATURE: 98.7 F | HEART RATE: 106 BPM | HEIGHT: 67 IN | OXYGEN SATURATION: 96 %

## 2018-01-15 DIAGNOSIS — Z79.4 UNCONTROLLED TYPE 2 DIABETES MELLITUS WITH HYPERGLYCEMIA, WITH LONG-TERM CURRENT USE OF INSULIN (HCC): ICD-10-CM

## 2018-01-15 DIAGNOSIS — E78.00 PURE HYPERCHOLESTEROLEMIA: ICD-10-CM

## 2018-01-15 DIAGNOSIS — I10 BENIGN ESSENTIAL HYPERTENSION: Primary | ICD-10-CM

## 2018-01-15 DIAGNOSIS — E11.65 UNCONTROLLED TYPE 2 DIABETES MELLITUS WITH HYPERGLYCEMIA, WITH LONG-TERM CURRENT USE OF INSULIN (HCC): ICD-10-CM

## 2018-01-15 PROCEDURE — 99214 OFFICE O/P EST MOD 30 MIN: CPT | Performed by: INTERNAL MEDICINE

## 2018-01-15 NOTE — PROGRESS NOTES
Subjective   Azalea Mendoza is a 47 y.o. female.     Chief Complaint   Patient presents with   • Hypertension   • Hyperlipidemia   • Diabetes       History of Present Illness   Hpi:  Patient is here to follow up  on the  blood pressure and cholesterol,    sugar  . The patient  is taking the blood pressure  medications as prescribed , she is due for labs for her cholesterol and sugar and she will be following with the endocrinologist for her diabetes which is currently uncontrolled on her current insulin regimen, last hba1c 9.3 , she saw the surgeon for her lipoma    The following portions of the patient's history were reviewed and updated as appropriate: allergies, current medications, past family history, past medical history, past social history, past surgical history and problem list.    Review of Systems   Constitutional: Negative for appetite change, fatigue and fever.   HENT: Negative for congestion, ear discharge, ear pain, sinus pressure and sore throat.    Eyes: Negative for pain and discharge.   Respiratory: Negative for cough, chest tightness, shortness of breath and wheezing.    Cardiovascular: Negative for chest pain, palpitations and leg swelling.   Gastrointestinal: Negative for abdominal pain, blood in stool, constipation, diarrhea and nausea.   Endocrine: Negative for cold intolerance and heat intolerance.   Genitourinary: Negative for dysuria, flank pain and frequency.   Musculoskeletal: Negative for back pain and joint swelling.   Skin: Negative for color change.   Allergic/Immunologic: Negative for environmental allergies and food allergies.   Neurological: Negative for dizziness, weakness, numbness and headaches.   Hematological: Negative for adenopathy. Does not bruise/bleed easily.   Psychiatric/Behavioral: Negative for behavioral problems and dysphoric mood. The patient is not nervous/anxious.          Current Outpatient Prescriptions:   •  cetirizine (zyrTEC) 10 MG tablet, Take 1  "tablet (10 mg total) by mouth Every Night, Disp: 90 tablet, Rfl: 3  •  diltiaZEM CD (CARDIZEM CD) 360 MG 24 hr capsule, Take 1 capsule (360 mg total) by mouth Daily, Disp: 90 capsule, Rfl: 3  •  glucose blood test strip, 1 each by Other route 3 (Three) Times a Day Use as instructed, Disp: 300 each, Rfl: 3  •  Insulin Degludec (TRESIBA FLEXTOUCH) 200 UNIT/ML solution pen-injector, Inject 70 Units under the skin Every Night, Disp: 5 pen, Rfl: 11  •  Insulin Pen Needle 32G X 4 MM misc, Use three daily with Novolog and once daily with tresibia, Disp: 180 each, Rfl: 11  •  losartan (COZAAR) 100 MG tablet, Take 1 tablet (100 mg total) by mouth Daily, Disp: 90 tablet, Rfl: 3  •  NOVOLOG FLEXPEN 100 UNIT/ML solution pen-injector sc pen, Inject 40 Units under the skin 3 (Three) Times a Day With Meals, Disp: 5 pen, Rfl: 11  •  omeprazole (priLOSEC) 20 MG capsule, Take 1 capsule (20 mg total) by mouth Daily, Disp: 90 capsule, Rfl: 3  •  EPIPEN 2-DAVID 0.3 MG/0.3ML solution auto-injector injection, USE AS INSTRUCTED, Disp: 1 each, Rfl: 1  •  mupirocin (BACTROBAN) 2 % cream, Apply topically 3 (Three) Times a Day, Disp: 15 g, Rfl: 6  •  traMADol (ULTRAM) 50 MG tablet, Take 1 tablet by mouth Every 12 (Twelve) Hours As Needed for Moderate Pain (4-6) or Severe Pain (7-10)., Disp: 60 tablet, Rfl: 0    Objective     Blood pressure 140/91, pulse 106, temperature 98.7 °F (37.1 °C), height 170.2 cm (67.01\"), weight (!) 170 kg (375 lb 8 oz), SpO2 96 %.    Physical Exam   Constitutional: She is oriented to person, place, and time. She appears well-developed and well-nourished. No distress.   HENT:   Head: Normocephalic and atraumatic.   Right Ear: External ear normal.   Left Ear: External ear normal.   Nose: Nose normal.   Mouth/Throat: Oropharynx is clear and moist.   Eyes: Conjunctivae and EOM are normal. Pupils are equal, round, and reactive to light.   Neck: Neck supple. No thyromegaly present.   Cardiovascular: Normal rate, regular " rhythm and normal heart sounds.    Pulmonary/Chest: Effort normal and breath sounds normal. No respiratory distress.   Abdominal: Soft. Bowel sounds are normal. She exhibits no distension. There is no tenderness. There is no rebound.   Musculoskeletal: Normal range of motion. She exhibits no edema.   Lymphadenopathy:     She has no cervical adenopathy.   Neurological: She is alert and oriented to person, place, and time.   No gross motor or sensory deficits   Skin: Skin is warm. She is not diaphoretic.   Psychiatric: She has a normal mood and affect.   Nursing note and vitals reviewed.      Results for orders placed or performed in visit on 11/22/17   Comprehensive metabolic panel   Result Value Ref Range    Glucose 198 (H) 74 - 98 mg/dL    BUN 14 7 - 20 mg/dL    Creatinine 0.60 0.60 - 1.30 mg/dL    eGFR Non African Am 108 >60 mL/min/1.73    eGFR African Am 130 >60 mL/min/1.73    BUN/Creatinine Ratio 23.3 7.1 - 23.5    Sodium 141 137 - 145 mmol/L    Potassium 4.3 3.5 - 5.1 mmol/L    Chloride 104 98 - 107 mmol/L    Total CO2 26.0 26.0 - 30.0 mmol/L    Calcium 10.9 (H) 8.4 - 10.2 mg/dL    Total Protein 6.9 6.3 - 8.2 g/dL    Albumin 4.10 3.50 - 5.00 g/dL    Globulin 2.8 gm/dL    A/G Ratio 1.5 1.0 - 2.0 g/dL    Total Bilirubin 0.6 0.2 - 1.3 mg/dL    Alkaline Phosphatase 112 38 - 126 U/L    AST (SGOT) 16 15 - 46 U/L    ALT (SGPT) 22 13 - 69 U/L   Lipid panel   Result Value Ref Range    Total Cholesterol 193 0 - 199 mg/dL    Triglycerides 137 <150 mg/dL    HDL Cholesterol 41 40 - 60 mg/dL    VLDL Cholesterol 27.4 mg/dL    LDL Cholesterol  125 (H) 0 - 99 mg/dL   Hemoglobin A1c   Result Value Ref Range    Hemoglobin A1C 9.30 %   Microalbumin / Creatinine Urine Ratio - Urine, Clean Catch   Result Value Ref Range    Creatinine, Urine 47.1 Not Estab. mg/dL    Microalbumin, Urine 3.4 Not Estab. ug/mL    Microalbumin/Creatinine Ratio 7.2 0.0 - 30.0 mg/g creat   CBC and Differential   Result Value Ref Range    WBC 11.37 (H) 4.80  - 10.80 10*3/mm3    RBC 5.28 4.20 - 5.40 10*6/mm3    Hemoglobin 14.0 12.0 - 16.0 g/dL    Hematocrit 44.4 37.0 - 47.0 %    MCV 84.1 81.0 - 99.0 fL    MCH 26.5 (L) 27.0 - 31.0 pg    MCHC 31.5 30.0 - 37.0 g/dL    RDW 13.9 11.5 - 14.5 %    Platelets 356 130 - 400 10*3/mm3    Neutrophil Rel % 58.5 37.0 - 80.0 %    Lymphocyte Rel % 32.5 10.0 - 50.0 %    Monocyte Rel % 4.3 0.0 - 12.0 %    Eosinophil Rel % 3.9 0.0 - 7.0 %    Basophil Rel % 0.4 0.0 - 2.5 %    Neutrophils Absolute 6.66 2.00 - 6.90 10*3/mm3    Lymphocytes Absolute 3.69 (H) 0.60 - 3.40 10*3/mm3    Monocytes Absolute 0.49 0.00 - 0.90 10*3/mm3    Eosinophils Absolute 0.44 0.00 - 0.70 10*3/mm3    Basophils Absolute 0.05 0.00 - 0.20 10*3/mm3    Immature Granulocyte Rel % 0.4 0.0 - 0.6 %    Immature Grans Absolute 0.04 0.00 - 0.06 10*3/mm3    nRBC 0.0 0.0 - 0.0 /100 WBC         Assessment/Plan   Azalea was seen today for hypertension, hyperlipidemia and diabetes.    Diagnoses and all orders for this visit:    Benign essential hypertension    Pure hypercholesterolemia  -     CBC & Differential  -     Comprehensive Metabolic Panel  -     Lipid Panel    Uncontrolled type 2 diabetes mellitus with hyperglycemia, with long-term current use of insulin  -     Hemoglobin A1c  -     Microalbumin / Creatinine Urine Ratio - Urine, Clean Catch      Plan:  1.  Benign essential hypertension: Will continue current medication, low-sodium diet advised  2.mixed hyperlipidemia: We will obtain fasting CMP and lipid panel.  Diet and excise counseled,    3. diabetes mellitus:   will obtain cmp and  hba1c  , diet and exercise counseled , will continue current medication , she will be seeing endocrinologist for her uncontrolled diabetes           Shelby Hatfield MD

## 2018-01-24 ENCOUNTER — OFFICE VISIT (OUTPATIENT)
Dept: ENDOCRINOLOGY | Facility: CLINIC | Age: 48
End: 2018-01-24

## 2018-01-24 VITALS
BODY MASS INDEX: 45.99 KG/M2 | SYSTOLIC BLOOD PRESSURE: 140 MMHG | DIASTOLIC BLOOD PRESSURE: 86 MMHG | HEART RATE: 91 BPM | OXYGEN SATURATION: 99 % | HEIGHT: 67 IN | WEIGHT: 293 LBS

## 2018-01-24 DIAGNOSIS — IMO0002 UNCONTROLLED TYPE 2 DIABETES MELLITUS WITH DIABETIC POLYNEUROPATHY, WITH LONG-TERM CURRENT USE OF INSULIN: Primary | ICD-10-CM

## 2018-01-24 DIAGNOSIS — E21.0 PRIMARY HYPERPARATHYROIDISM (HCC): ICD-10-CM

## 2018-01-24 LAB
25(OH)D3 SERPL-MCNC: 11 NG/ML
ALBUMIN SERPL-MCNC: 4.6 G/DL (ref 3.2–4.8)
ANION GAP SERPL CALCULATED.3IONS-SCNC: 11 MMOL/L (ref 3–11)
BUN BLD-MCNC: 15 MG/DL (ref 9–23)
BUN/CREAT SERPL: 25 (ref 7–25)
CALCIUM SPEC-SCNC: 10.9 MG/DL (ref 8.7–10.4)
CHLORIDE SERPL-SCNC: 102 MMOL/L (ref 99–109)
CO2 SERPL-SCNC: 27 MMOL/L (ref 20–31)
CREAT BLD-MCNC: 0.6 MG/DL (ref 0.6–1.3)
GFR SERPL CREATININE-BSD FRML MDRD: 108 ML/MIN/1.73
GLUCOSE BLD-MCNC: 196 MG/DL (ref 70–100)
PHOSPHATE SERPL-MCNC: 3 MG/DL (ref 2.4–5.1)
POTASSIUM BLD-SCNC: 4.8 MMOL/L (ref 3.5–5.5)
SODIUM BLD-SCNC: 140 MMOL/L (ref 132–146)

## 2018-01-24 PROCEDURE — 99244 OFF/OP CNSLTJ NEW/EST MOD 40: CPT | Performed by: INTERNAL MEDICINE

## 2018-01-24 PROCEDURE — 80069 RENAL FUNCTION PANEL: CPT | Performed by: INTERNAL MEDICINE

## 2018-01-24 PROCEDURE — 83970 ASSAY OF PARATHORMONE: CPT | Performed by: INTERNAL MEDICINE

## 2018-01-24 PROCEDURE — 82306 VITAMIN D 25 HYDROXY: CPT | Performed by: INTERNAL MEDICINE

## 2018-01-24 RX ORDER — METFORMIN HYDROCHLORIDE 500 MG/1
500 TABLET, FILM COATED, EXTENDED RELEASE ORAL 2 TIMES DAILY WITH MEALS
Qty: 180 TABLET | Refills: 3 | Status: SHIPPED | OUTPATIENT
Start: 2018-01-24 | End: 2018-02-09 | Stop reason: SDUPTHER

## 2018-01-24 RX ORDER — TOLTERODINE 4 MG/1
4 CAPSULE, EXTENDED RELEASE ORAL DAILY
COMMUNITY
End: 2018-05-18 | Stop reason: SDUPTHER

## 2018-01-26 LAB — PTH-INTACT SERPL-MCNC: 62 PG/ML (ref 15–65)

## 2018-01-31 ENCOUNTER — TELEPHONE (OUTPATIENT)
Dept: INTERNAL MEDICINE | Facility: CLINIC | Age: 48
End: 2018-01-31

## 2018-01-31 NOTE — TELEPHONE ENCOUNTER
THUYI:  BLOOD SUGAR IS IN A GOOD RANGE IN THE AM BUT THROUGH OUT THE DAY SUGAR HAS BEEN IN LOW 60s AND HAS BEEN HAVING TO DRINK JUICE TO KEEP IT UP.   WOULD LIKE TO KNOW IF YOU'D LIKE TO READJUST HER MEDICATION AGAIN.

## 2018-01-31 NOTE — TELEPHONE ENCOUNTER
Spoke to patient about blood glucoses. Will decrease Tresiba to 72 units at bedtime and Decrease Novolog to 22 units TID AC + same correction factor.   Skylar Beckett MD

## 2018-02-09 PROBLEM — H65.00 ACUTE SEROUS OTITIS MEDIA: Status: RESOLVED | Noted: 2017-11-07 | Resolved: 2018-02-09

## 2018-02-09 PROBLEM — R10.9 ABDOMINAL PAIN: Status: RESOLVED | Noted: 2017-11-07 | Resolved: 2018-02-09

## 2018-02-09 PROBLEM — H66.009 ACUTE SUPPURATIVE OTITIS MEDIA WITHOUT SPONTANEOUS RUPTURE OF EAR DRUM: Status: RESOLVED | Noted: 2017-11-07 | Resolved: 2018-02-09

## 2018-02-09 PROBLEM — J01.00 ACUTE MAXILLARY SINUSITIS: Status: RESOLVED | Noted: 2017-11-07 | Resolved: 2018-02-09

## 2018-02-09 PROBLEM — R06.02 SHORTNESS OF BREATH: Status: RESOLVED | Noted: 2017-11-07 | Resolved: 2018-02-09

## 2018-02-09 PROBLEM — M25.549 HAND JOINT PAIN: Status: RESOLVED | Noted: 2017-11-07 | Resolved: 2018-02-09

## 2018-02-09 PROBLEM — R00.2 PALPITATIONS: Status: RESOLVED | Noted: 2017-11-07 | Resolved: 2018-02-09

## 2018-02-09 PROBLEM — M79.609 PAIN IN EXTREMITY: Status: RESOLVED | Noted: 2017-11-07 | Resolved: 2018-02-09

## 2018-02-09 PROBLEM — N39.0 URINARY TRACT INFECTION: Status: RESOLVED | Noted: 2017-11-07 | Resolved: 2018-02-09

## 2018-02-09 PROBLEM — J20.9 ACUTE BRONCHITIS WITH BRONCHOSPASM: Status: RESOLVED | Noted: 2017-11-07 | Resolved: 2018-02-09

## 2018-02-09 RX ORDER — METFORMIN HYDROCHLORIDE 500 MG/1
TABLET, EXTENDED RELEASE ORAL
Refills: 3 | COMMUNITY
Start: 2018-01-24 | End: 2018-04-20 | Stop reason: SDUPTHER

## 2018-04-20 ENCOUNTER — TELEPHONE (OUTPATIENT)
Dept: INTERNAL MEDICINE | Facility: CLINIC | Age: 48
End: 2018-04-20

## 2018-04-20 DIAGNOSIS — IMO0002 UNCONTROLLED TYPE 2 DIABETES MELLITUS WITH DIABETIC POLYNEUROPATHY, WITH LONG-TERM CURRENT USE OF INSULIN: Primary | ICD-10-CM

## 2018-04-20 RX ORDER — METFORMIN HYDROCHLORIDE 500 MG/1
1000 TABLET, EXTENDED RELEASE ORAL
Qty: 360 TABLET | Refills: 3 | Status: SHIPPED | OUTPATIENT
Start: 2018-04-20 | End: 2018-06-11 | Stop reason: SDUPTHER

## 2018-04-20 NOTE — TELEPHONE ENCOUNTER
PATIENT STATES THAT DR. HARMON DISCUSSED TAKING HER OFF OF TRISEBA AND TRYING AN ADDITIONAL METFORMIN. SHE IS ALMOST OUT OF TRESIBA AND WAS WONDERING IF DR. HARMON WANTS TO SWITCH THE MEDS BEFORE SHE PURCHASES THE TRESIBA

## 2018-04-20 NOTE — TELEPHONE ENCOUNTER
Spoke to patient. She has lost 55 lbs. Majority of BGs are < 120 currently. Will increase metformin ER to 1000 mg BID and stop Tresiba. Pt will continue to monitor her BGs and contact if hyperglycemia persistently occurs off the Tresiba.  Skylar Beckett MD

## 2018-04-27 RX ORDER — EPINEPHRINE 0.3 MG/.3ML
INJECTION INTRAMUSCULAR
Qty: 1 EACH | Refills: 1 | Status: SHIPPED | OUTPATIENT
Start: 2018-04-27 | End: 2019-01-03 | Stop reason: SDUPTHER

## 2018-05-08 ENCOUNTER — TELEPHONE (OUTPATIENT)
Dept: INTERNAL MEDICINE | Facility: CLINIC | Age: 48
End: 2018-05-08

## 2018-05-08 NOTE — TELEPHONE ENCOUNTER
Spoke to patient about her BG numbers. She continues to lose weight intentionally through the WW[employer]. I presume the higher numbers are due to her UTI. Pt instructed to continue to monitor BG and keep log. Will f/u in 6/11 as scheduled  Skylar Beckett MD

## 2018-05-18 RX ORDER — TOLTERODINE 4 MG/1
4 CAPSULE, EXTENDED RELEASE ORAL DAILY
Qty: 90 CAPSULE | Refills: 1 | Status: SHIPPED | OUTPATIENT
Start: 2018-05-18 | End: 2018-11-20 | Stop reason: SDUPTHER

## 2018-05-18 RX ORDER — TOLTERODINE 4 MG/1
CAPSULE, EXTENDED RELEASE ORAL
Qty: 90 CAPSULE | Refills: 1 | OUTPATIENT
Start: 2018-05-18

## 2018-05-18 NOTE — TELEPHONE ENCOUNTER
"Chart shows you last sent in December 2017 if you unclick \"current meds only\" at the top of the medication list in the chart.   "

## 2018-06-11 ENCOUNTER — OFFICE VISIT (OUTPATIENT)
Dept: ENDOCRINOLOGY | Facility: CLINIC | Age: 48
End: 2018-06-11

## 2018-06-11 VITALS
WEIGHT: 293 LBS | HEIGHT: 67 IN | SYSTOLIC BLOOD PRESSURE: 124 MMHG | OXYGEN SATURATION: 98 % | DIASTOLIC BLOOD PRESSURE: 72 MMHG | HEART RATE: 76 BPM | BODY MASS INDEX: 45.99 KG/M2

## 2018-06-11 DIAGNOSIS — IMO0002 UNCONTROLLED TYPE 2 DIABETES MELLITUS WITH DIABETIC POLYNEUROPATHY, WITH LONG-TERM CURRENT USE OF INSULIN: Primary | ICD-10-CM

## 2018-06-11 DIAGNOSIS — E21.0 PRIMARY HYPERPARATHYROIDISM (HCC): ICD-10-CM

## 2018-06-11 LAB
GLUCOSE BLDC GLUCOMTR-MCNC: 125 MG/DL (ref 70–130)
HBA1C MFR BLD: 5.8 %

## 2018-06-11 PROCEDURE — 99213 OFFICE O/P EST LOW 20 MIN: CPT | Performed by: INTERNAL MEDICINE

## 2018-06-11 PROCEDURE — 83036 HEMOGLOBIN GLYCOSYLATED A1C: CPT | Performed by: INTERNAL MEDICINE

## 2018-06-11 PROCEDURE — 82947 ASSAY GLUCOSE BLOOD QUANT: CPT | Performed by: INTERNAL MEDICINE

## 2018-06-11 RX ORDER — METFORMIN HYDROCHLORIDE 500 MG/1
500 TABLET, EXTENDED RELEASE ORAL
Qty: 360 TABLET | Refills: 3 | Status: SHIPPED | OUTPATIENT
Start: 2018-06-11 | End: 2019-06-02 | Stop reason: SDUPTHER

## 2018-06-11 NOTE — PROGRESS NOTES
Chief Complaint   Patient presents with   • Diabetes     f/u for DM 2        HPI:   Azalea Mendoza is a 47 y.o.female who [facility] f/u evaluation of her Type 2 DM and hypercalcemia due to PHPTH. Last visit 01/24/2018. Her history is as follows:    Interim Events:   - has been able to lose 74 lbs since January 2018 with [employer].  - pt has been contacting me with her BGs log. Because of her weight loss, she has been able to discontinue all insulin.   - now only on metformin ER    1) Type 2 DM with associated complications of mild peripheral neuropathy:   - Diagnosed approximately in her 30's  - started insulin in 2016. Stopped insulin in 02/2018 after significant weight loss with WWP    Current DM Medications:  - metformin ER 1000 mg BID: causing diarrhea. 1 tab BID was previously better tolerated    Glucometer: log book reviewed  - majority of pre-meal BGs < 130    DM Health Maintenance:  Ophtho: Last visit 02/2018, no retinopathy per pt  Podiatry: no recent visit  Monofilament / Foot exam: due  Lipids: (11/2017) Tchol 193, , HDL 41,  - not on statin  Urine Microalb/Cr ratio: (11/2017) normal at 7.2  TSH: (07/2015) normal at 3.40  Aspirin: N/A    2) hypercalcemia due to Primary hyperparathyroidism:  - review of chart shows mild hypercalcemia since 2014 (ranging 10.5 - 11.2)  - started Vit D3 1000 IU daily as directed since January 2018  Recent labs:   (08/2017) PTH 48 (15 - 65), Total calcium 11.0  (01/2018) PTH 62 (15 - 65), Total calcium 10.9 (8.7 - 10.4), vitamin D 25 OH - 11.0  - no h/o nephrolithiasis, no family h/o hypercalcemia, no h/o fracture  -DEXA (11/12/2014) Spine, Hip  - normal    3) Multinodular goiter:  - pt with bilateral mixed cystic and solid nodules  - Had FNA of left lobe nodule that showed benign cytology  - followed by ENT.     Review of Systems   Constitutional: Negative.  Negative for fatigue.        Intentional weight loss   HENT: Negative.   "  Eyes: Negative.    Respiratory: Positive for shortness of breath (with exertion).    Cardiovascular: Negative.  Negative for palpitations.   Gastrointestinal: Negative.    Endocrine: Negative.  Negative for heat intolerance.   Genitourinary: Negative.    Musculoskeletal: Positive for arthralgias (improving with weight loss). Negative for joint swelling.   Skin: Negative.    Allergic/Immunologic: Negative.    Neurological: Positive for numbness (feet, mild).   Hematological: Negative.    Psychiatric/Behavioral: Negative for sleep disturbance.     The following portions of the patient's history were reviewed and updated as appropriate: allergies, current medications, past family history, past medical history, past social history, past surgical history and problem list.    /72   Pulse 76   Ht 170.2 cm (67.01\")   Wt (!) 137 kg (301 lb)   SpO2 98%   BMI 47.13 kg/m²   Physical Exam   Constitutional: She is oriented to person, place, and time. She appears well-developed. No distress.   obese   HENT:   Head: Normocephalic.   Mouth/Throat: Oropharynx is clear and moist.   Eyes: Conjunctivae and EOM are normal. Pupils are equal, round, and reactive to light.   Neck: No tracheal deviation present. Thyromegaly (mild, left lobe enlarged, right lobe normal in size) present.   No palpable thyroid nodules     Cardiovascular: Normal rate, regular rhythm and normal heart sounds.    No murmur heard.  Pulmonary/Chest: Effort normal and breath sounds normal. No respiratory distress.   Abdominal: Soft. Bowel sounds are normal. She exhibits no mass. There is no tenderness.   Lymphadenopathy:     She has no cervical adenopathy.   Neurological: She is alert and oriented to person, place, and time. No cranial nerve deficit.   Skin: Skin is warm and dry. She is not diaphoretic. No erythema.   Psychiatric: She has a normal mood and affect. Her behavior is normal.   Vitals reviewed.    LABS/IMAGING: outside records reviewed and " summarized in HPI  Results for orders placed or performed in visit on 06/11/18   POC Glucose Fingerstick   Result Value Ref Range    Glucose 125 70 - 130 mg/dL   POC Glycosylated Hemoglobin (Hb A1C)   Result Value Ref Range    Hemoglobin A1C 5.8 %     ASSESSMENT/PLAN:  1) Type 2 DM with associated complications of mild peripheral neuropathy: controlled, significant improvement after weight loss. Has been able to discontinue insulin. A1C% 5.8 today!    - decrease metformin to  mg BID (to be used as an insulin sensitizer). Pt to contact me if having diarrhea with the lower dose as well.     2) hypercalcemia due to Primary hyperparathyroidism:  - discussed surgical treatment vs medical observation. Pt will likely pursue surgery. Will complete localization imaging at her follow-up  - pt with vitamin D deficiency. On vit D with OTC D3 1000 units daily. D/W pt too much vitamin D can lead to worsening hypercalcemia  - will check PTH, vit D 25 OH. Pt completing labs for her PCP tomorrow.    RTC 6 months

## 2018-08-19 ENCOUNTER — APPOINTMENT (OUTPATIENT)
Dept: GENERAL RADIOLOGY | Facility: HOSPITAL | Age: 48
End: 2018-08-19

## 2018-08-19 ENCOUNTER — HOSPITAL ENCOUNTER (OUTPATIENT)
Facility: HOSPITAL | Age: 48
Setting detail: OBSERVATION
Discharge: HOME | End: 2018-08-20
Attending: EMERGENCY MEDICINE | Admitting: HOSPITALIST

## 2018-08-19 DIAGNOSIS — I48.91 ATRIAL FIBRILLATION, UNSPECIFIED TYPE (HCC): Primary | ICD-10-CM

## 2018-08-19 PROBLEM — E11.40 TYPE 2 DIABETES MELLITUS WITH DIABETIC NEUROPATHY, WITHOUT LONG-TERM CURRENT USE OF INSULIN: Status: ACTIVE | Noted: 2017-07-21

## 2018-08-19 PROBLEM — D72.829 LEUKOCYTOSIS: Status: ACTIVE | Noted: 2018-08-19

## 2018-08-19 LAB
ALBUMIN SERPL-MCNC: 4.4 G/DL (ref 3.5–5)
ALBUMIN/GLOB SERPL: 1.5 G/DL (ref 1–2)
ALP SERPL-CCNC: 126 U/L (ref 38–126)
ALT SERPL W P-5'-P-CCNC: 15 U/L (ref 13–69)
ANION GAP SERPL CALCULATED.3IONS-SCNC: 13.8 MMOL/L (ref 10–20)
AST SERPL-CCNC: 23 U/L (ref 15–46)
BASOPHILS # BLD AUTO: 0.05 10*3/MM3 (ref 0–0.2)
BASOPHILS NFR BLD AUTO: 0.4 % (ref 0–2.5)
BILIRUB SERPL-MCNC: 0.4 MG/DL (ref 0.2–1.3)
BUN BLD-MCNC: 20 MG/DL (ref 7–20)
BUN/CREAT SERPL: 33.3 (ref 7.1–23.5)
CALCIUM SPEC-SCNC: 10.9 MG/DL (ref 8.4–10.2)
CHLORIDE SERPL-SCNC: 106 MMOL/L (ref 98–107)
CO2 SERPL-SCNC: 28 MMOL/L (ref 26–30)
CREAT BLD-MCNC: 0.6 MG/DL (ref 0.6–1.3)
DEPRECATED RDW RBC AUTO: 45 FL (ref 37–54)
EOSINOPHIL # BLD AUTO: 0.42 10*3/MM3 (ref 0–0.7)
EOSINOPHIL NFR BLD AUTO: 3 % (ref 0–7)
ERYTHROCYTE [DISTWIDTH] IN BLOOD BY AUTOMATED COUNT: 13.9 % (ref 11.5–14.5)
GFR SERPL CREATININE-BSD FRML MDRD: 108 ML/MIN/1.73
GLOBULIN UR ELPH-MCNC: 3 GM/DL
GLUCOSE BLD-MCNC: 127 MG/DL (ref 74–98)
GLUCOSE BLDC GLUCOMTR-MCNC: 100 MG/DL (ref 70–130)
GLUCOSE BLDC GLUCOMTR-MCNC: 118 MG/DL (ref 70–130)
HCG SERPL QL: NEGATIVE
HCT VFR BLD AUTO: 47.8 % (ref 37–47)
HGB BLD-MCNC: 15.1 G/DL (ref 12–16)
IMM GRANULOCYTES # BLD: 0.04 10*3/MM3 (ref 0–0.06)
IMM GRANULOCYTES NFR BLD: 0.3 % (ref 0–0.6)
LYMPHOCYTES # BLD AUTO: 5.48 10*3/MM3 (ref 0.6–3.4)
LYMPHOCYTES NFR BLD AUTO: 39.6 % (ref 10–50)
MCH RBC QN AUTO: 28 PG (ref 27–31)
MCHC RBC AUTO-ENTMCNC: 31.6 G/DL (ref 30–37)
MCV RBC AUTO: 88.7 FL (ref 81–99)
MONOCYTES # BLD AUTO: 0.78 10*3/MM3 (ref 0–0.9)
MONOCYTES NFR BLD AUTO: 5.6 % (ref 0–12)
NEUTROPHILS # BLD AUTO: 7.08 10*3/MM3 (ref 2–6.9)
NEUTROPHILS NFR BLD AUTO: 51.1 % (ref 37–80)
NRBC BLD MANUAL-RTO: 0 /100 WBC (ref 0–0)
NT-PROBNP SERPL-MCNC: 51.3 PG/ML (ref 0–125)
PLATELET # BLD AUTO: 303 10*3/MM3 (ref 130–400)
PMV BLD AUTO: 10.2 FL (ref 6–12)
POTASSIUM BLD-SCNC: 3.8 MMOL/L (ref 3.5–5.1)
PROT SERPL-MCNC: 7.4 G/DL (ref 6.3–8.2)
RBC # BLD AUTO: 5.39 10*6/MM3 (ref 4.2–5.4)
SODIUM BLD-SCNC: 144 MMOL/L (ref 137–145)
T4 FREE SERPL-MCNC: 1.24 NG/DL (ref 0.78–2.19)
TROPONIN I SERPL-MCNC: <0.012 NG/ML (ref 0–0.03)
TSH SERPL DL<=0.05 MIU/L-ACNC: 1.79 MIU/ML (ref 0.47–4.68)
WBC NRBC COR # BLD: 13.85 10*3/MM3 (ref 4.8–10.8)

## 2018-08-19 PROCEDURE — 83880 ASSAY OF NATRIURETIC PEPTIDE: CPT | Performed by: EMERGENCY MEDICINE

## 2018-08-19 PROCEDURE — 84439 ASSAY OF FREE THYROXINE: CPT | Performed by: HOSPITALIST

## 2018-08-19 PROCEDURE — 82962 GLUCOSE BLOOD TEST: CPT

## 2018-08-19 PROCEDURE — 94762 N-INVAS EAR/PLS OXIMTRY CONT: CPT

## 2018-08-19 PROCEDURE — 71046 X-RAY EXAM CHEST 2 VIEWS: CPT

## 2018-08-19 PROCEDURE — G0378 HOSPITAL OBSERVATION PER HR: HCPCS

## 2018-08-19 PROCEDURE — 99220 PR INITIAL OBSERVATION CARE/DAY 70 MINUTES: CPT | Performed by: HOSPITALIST

## 2018-08-19 PROCEDURE — 84484 ASSAY OF TROPONIN QUANT: CPT | Performed by: EMERGENCY MEDICINE

## 2018-08-19 PROCEDURE — 96372 THER/PROPH/DIAG INJ SC/IM: CPT

## 2018-08-19 PROCEDURE — 80053 COMPREHEN METABOLIC PANEL: CPT | Performed by: EMERGENCY MEDICINE

## 2018-08-19 PROCEDURE — 84484 ASSAY OF TROPONIN QUANT: CPT | Performed by: FAMILY MEDICINE

## 2018-08-19 PROCEDURE — 85025 COMPLETE CBC W/AUTO DIFF WBC: CPT | Performed by: EMERGENCY MEDICINE

## 2018-08-19 PROCEDURE — 93005 ELECTROCARDIOGRAM TRACING: CPT | Performed by: EMERGENCY MEDICINE

## 2018-08-19 PROCEDURE — 96374 THER/PROPH/DIAG INJ IV PUSH: CPT

## 2018-08-19 PROCEDURE — 84443 ASSAY THYROID STIM HORMONE: CPT | Performed by: HOSPITALIST

## 2018-08-19 PROCEDURE — 25010000002 ENOXAPARIN PER 10 MG: Performed by: HOSPITALIST

## 2018-08-19 PROCEDURE — 99285 EMERGENCY DEPT VISIT HI MDM: CPT

## 2018-08-19 PROCEDURE — 84703 CHORIONIC GONADOTROPIN ASSAY: CPT | Performed by: EMERGENCY MEDICINE

## 2018-08-19 PROCEDURE — 84484 ASSAY OF TROPONIN QUANT: CPT | Performed by: HOSPITALIST

## 2018-08-19 RX ORDER — DILTIAZEM HYDROCHLORIDE 180 MG/1
360 CAPSULE, COATED, EXTENDED RELEASE ORAL
Status: DISCONTINUED | OUTPATIENT
Start: 2018-08-19 | End: 2018-08-19

## 2018-08-19 RX ORDER — NICOTINE POLACRILEX 4 MG
1 LOZENGE BUCCAL
Status: DISCONTINUED | OUTPATIENT
Start: 2018-08-19 | End: 2018-08-20 | Stop reason: HOSPADM

## 2018-08-19 RX ORDER — LOSARTAN POTASSIUM 50 MG/1
100 TABLET ORAL DAILY
Status: DISCONTINUED | OUTPATIENT
Start: 2018-08-19 | End: 2018-08-20 | Stop reason: HOSPADM

## 2018-08-19 RX ORDER — PANTOPRAZOLE SODIUM 40 MG/1
40 TABLET, DELAYED RELEASE ORAL EVERY MORNING
Status: DISCONTINUED | OUTPATIENT
Start: 2018-08-19 | End: 2018-08-20 | Stop reason: HOSPADM

## 2018-08-19 RX ORDER — CETIRIZINE HYDROCHLORIDE 10 MG/1
10 TABLET ORAL NIGHTLY
Status: DISCONTINUED | OUTPATIENT
Start: 2018-08-19 | End: 2018-08-20 | Stop reason: HOSPADM

## 2018-08-19 RX ORDER — HYDROXYZINE HYDROCHLORIDE 25 MG/1
25 TABLET, FILM COATED ORAL EVERY 6 HOURS PRN
Status: DISCONTINUED | OUTPATIENT
Start: 2018-08-19 | End: 2018-08-20 | Stop reason: HOSPADM

## 2018-08-19 RX ORDER — ATORVASTATIN CALCIUM 10 MG/1
10 TABLET, FILM COATED ORAL NIGHTLY
Status: DISCONTINUED | OUTPATIENT
Start: 2018-08-19 | End: 2018-08-20 | Stop reason: HOSPADM

## 2018-08-19 RX ORDER — MELOXICAM 15 MG/1
15 TABLET ORAL AS NEEDED
COMMUNITY
End: 2018-08-20 | Stop reason: HOSPADM

## 2018-08-19 RX ORDER — TRIAMTERENE AND HYDROCHLOROTHIAZIDE 37.5; 25 MG/1; MG/1
1 TABLET ORAL DAILY
Status: DISCONTINUED | OUTPATIENT
Start: 2018-08-20 | End: 2018-08-20 | Stop reason: HOSPADM

## 2018-08-19 RX ORDER — HYDRALAZINE HYDROCHLORIDE 20 MG/ML
20 INJECTION INTRAMUSCULAR; INTRAVENOUS EVERY 6 HOURS PRN
Status: DISCONTINUED | OUTPATIENT
Start: 2018-08-19 | End: 2018-08-20 | Stop reason: HOSPADM

## 2018-08-19 RX ORDER — MUPIROCIN CALCIUM 20 MG/G
CREAM TOPICAL
Status: DISCONTINUED | OUTPATIENT
Start: 2018-08-19 | End: 2018-08-20 | Stop reason: HOSPADM

## 2018-08-19 RX ORDER — OXYBUTYNIN CHLORIDE 5 MG/1
10 TABLET, EXTENDED RELEASE ORAL DAILY
Status: DISCONTINUED | OUTPATIENT
Start: 2018-08-19 | End: 2018-08-20 | Stop reason: HOSPADM

## 2018-08-19 RX ORDER — SODIUM CHLORIDE 0.9 % (FLUSH) 0.9 %
1-10 SYRINGE (ML) INJECTION AS NEEDED
Status: DISCONTINUED | OUTPATIENT
Start: 2018-08-19 | End: 2018-08-20 | Stop reason: HOSPADM

## 2018-08-19 RX ORDER — ACETAMINOPHEN 325 MG/1
650 TABLET ORAL EVERY 4 HOURS PRN
Status: DISCONTINUED | OUTPATIENT
Start: 2018-08-19 | End: 2018-08-20 | Stop reason: HOSPADM

## 2018-08-19 RX ORDER — MELOXICAM 7.5 MG/1
15 TABLET ORAL AS NEEDED
Status: CANCELLED | OUTPATIENT
Start: 2018-08-19

## 2018-08-19 RX ORDER — ONDANSETRON 2 MG/ML
4 INJECTION INTRAMUSCULAR; INTRAVENOUS EVERY 6 HOURS PRN
Status: DISCONTINUED | OUTPATIENT
Start: 2018-08-19 | End: 2018-08-20 | Stop reason: HOSPADM

## 2018-08-19 RX ORDER — DEXTROSE MONOHYDRATE 25 G/50ML
25 INJECTION, SOLUTION INTRAVENOUS
Status: DISCONTINUED | OUTPATIENT
Start: 2018-08-19 | End: 2018-08-20 | Stop reason: HOSPADM

## 2018-08-19 RX ORDER — SODIUM CHLORIDE 0.9 % (FLUSH) 0.9 %
10 SYRINGE (ML) INJECTION AS NEEDED
Status: DISCONTINUED | OUTPATIENT
Start: 2018-08-19 | End: 2018-08-20 | Stop reason: HOSPADM

## 2018-08-19 RX ORDER — DOCUSATE SODIUM 100 MG/1
100 CAPSULE, LIQUID FILLED ORAL DAILY PRN
Status: DISCONTINUED | OUTPATIENT
Start: 2018-08-19 | End: 2018-08-20 | Stop reason: HOSPADM

## 2018-08-19 RX ORDER — DILTIAZEM HYDROCHLORIDE 5 MG/ML
15 INJECTION INTRAVENOUS ONCE
Status: COMPLETED | OUTPATIENT
Start: 2018-08-19 | End: 2018-08-19

## 2018-08-19 RX ADMIN — CETIRIZINE HYDROCHLORIDE 10 MG: 10 TABLET, FILM COATED ORAL at 21:04

## 2018-08-19 RX ADMIN — DILTIAZEM HYDROCHLORIDE 15 MG: 5 INJECTION INTRAVENOUS at 03:35

## 2018-08-19 RX ADMIN — ATORVASTATIN CALCIUM 10 MG: 10 TABLET, FILM COATED ORAL at 21:04

## 2018-08-19 RX ADMIN — SODIUM CHLORIDE 500 ML: 9 INJECTION, SOLUTION INTRAVENOUS at 03:35

## 2018-08-19 RX ADMIN — DILTIAZEM HYDROCHLORIDE 60 MG: 30 TABLET, FILM COATED ORAL at 04:53

## 2018-08-19 RX ADMIN — DILTIAZEM HYDROCHLORIDE 360 MG: 180 CAPSULE, COATED, EXTENDED RELEASE ORAL at 11:28

## 2018-08-19 RX ADMIN — VITAMIN D, TAB 1000IU (100/BT) 1000 UNITS: 25 TAB at 11:22

## 2018-08-19 RX ADMIN — ENOXAPARIN SODIUM 130 MG: 80 INJECTION SUBCUTANEOUS at 11:22

## 2018-08-19 RX ADMIN — OXYBUTYNIN CHLORIDE 10 MG: 5 TABLET, FILM COATED, EXTENDED RELEASE ORAL at 11:23

## 2018-08-19 RX ADMIN — PANTOPRAZOLE SODIUM 40 MG: 40 TABLET, DELAYED RELEASE ORAL at 11:22

## 2018-08-19 RX ADMIN — LOSARTAN POTASSIUM 100 MG: 50 TABLET, FILM COATED ORAL at 11:22

## 2018-08-19 RX ADMIN — MUPIROCIN: 2 CREAM TOPICAL at 12:05

## 2018-08-19 RX ADMIN — ENOXAPARIN SODIUM 130 MG: 80 INJECTION SUBCUTANEOUS at 23:06

## 2018-08-19 NOTE — CONSULTS
"Jimbo Mcrae MD  CARDIOLOGY CONSULT    PATIENT: Azalea Mendoza                                                   DATE: 18   327/1  : 1970     PRIMARY PHYSICIAN: Shelby Nunez M.D.    CHIEF COMPLAINT: History of paroxysmal atrial fibrillation    HISTORY OF PRESENT ILLNESS:  Patient is a 47-year-old, morbidly obese,  female with multiple risk factors for atherosclerotic cardiovascular disease including postmenopausal status, hypertension and diabetes, who woke up with palpitations last night and was found to have paroxysmal atrial fibrillation prompting AV blocker therapy with eventual restoration of normal sinus rhythm..      Patient, who has history of degenerative joint disease, she has reasonable exercise tolerance and after losing substantial weight patient has been able to walk at least a half a mile without any limitations such as effort related angina or exertional dyspnea.  She denies orthopnea, PND’s or significant dependent edema as well though on occasion she has minimal dependent edema involving left lower extremity.      Patient, who is known to have substrates for dysrhythmias, has noticed palpitation on and off but no prior episodes of syncope or loss of consciousness in the past.  Patient woke up with onset of rapid heartbeat last night after a busy day while working on her deck.  Patient was brought to emergency room which was found to have atrial fibrillation with rapid ventricular response prompting AV blocker therapy and eventually patient were treated normal sinus rhythm.  Other than awareness of rapid heartbeat, patient has not had any additional symptomatology such as dizziness, presyncope or loss of consciousness.    Patient, who is known to have essentially normal coronary anatomy based on prior angiographic studies, has not expressed any chest pain, pressure or tightness.  She does have prior history of coronary reflux esophagitis.  Patient, who has been on " proton pump inhibitor therapy has noticed occasional postprandial discomfort with no recent history of any effort related angina.    She does have history of “murmur” but no known definitive valvular disease or congenital heart disease but there is no history of further work up.    REVIEW OF SYSTEMS:  constitutional: Patient, who was able to lose significant weight, has been compliant with [employer] program.  HEENT: No history of nasal discharge/nasal obstruction or sore throat. CNS: No history of headache, visual complaints, seizure disorder, or sensory or motor complaints. BRONCHOPULMONARY: Patient has no history of cough, pleuritic chest pain or hemoptysis. GI: No history of nausea, vomiting, hematemesis, melena, hematochezia. History of very rare discomfort.  : No history of nocturia, hematuria or dysuria.  MUSCULOSKELETAL: No history of myalgia or arthralgias. DERM: No history of skin rash. ENDO: No history of cold or heat intolerance or thyromegaly. HEMATOPOIETEC: No history of bleeding from any site, anemia or lymphadenopathy. PSYCH: No history of symptoms suggesting depression or anxiety.  Sleep: reasonable sleeping daytime somnolence.      PAST MEDICAL HISTORY:  1. Atherosclerotic cardiovascular disease:  · History of myocardial perfusion imaging study which was thought to be false-positive as a subsequent angiographic studies in 2018 did not demonstrate any CAD.  · History of discomfort involving lower extremities with no known peripheral artery disease.  2. History of palpitation with prior history of ventricular ectopy for which patient has been on high-dose calcium antagonist therapy.  On account of significant allergies with potential need for EpiPen, patient has not been on beta blocker therapy.  3. Patient has known hypertensive since 1992.  She is not known to have renovascular disease or renal parenchymal disease.  She is not known to have target organ damage as well.  Patient does have  "history of known sleep-disordered breathing.  4. History of diabetes since 2003, for which patient has been on oral hypoglycemics with improved glycemic control after introduction of metformin and weight loss with A1c of 5.5.  5. History of known sleep-disordered breathing based on remote polysomnography with excellent compliance but no titration study for more than a decade.  6. History of murmur since infancy with no known definitive valvular disease.  7. History of gastroesophageal reflux disease with no known prior peptic ulcer, H-pylori infection.  She did develop esophageal stricture and require dilatation when she developed acute dysphagia in emergency room in [facility].    8. History of nasal allergies and possible reactive airway disease.  Patient has history of allergy to numerous foods.    PAST SURGICAL HISTORY:  1. Patient developed ovarian cancer and underwent complete hysterectomy in 09/02, currently she is on Premarin.  2. History of ovarian cyst removal in the past.    SOCIAL HISTORY:  Patient is  and lives with her spouse.  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  She does not smoke or use alcohol.    FAMILY HISTORY:  There is no history of premature coronary artery disease.    PHYSICAL EXAMINATION:  GENERAL: Very pleasant adult female  BP (!) 182/103 (BP Location: Left arm, Patient Position: Sitting)   Pulse 86   Temp 98.6 °F (37 °C) (Oral)   Resp 18   Ht 170.2 cm (67\")   Wt 130 kg (287 lb 6.4 oz)   SpO2 98%   BMI 45.01 kg/m²  Body mass index is 45.01 kg/m². HEENT:  Head: Atraumatic, normocephalic, No tenderness over paranasal sinuses, external nares normal. No oral or nasal mucosal lesion. Sclera non-icteric ocular movements are normal with pupils reacting both to light and accommodations. Ocular fundi not seen. NECK: Carotid upstroke is normal, there are no carotid bruits, no JVD, no thyromegaly, no cervical or axillary lymphadenopathy. HEART: [place] beat is not " displaced, no thrill is appreciated and both heart sounds are normal.  There is no murmur or rub audible. BRONCHOPULMONARY: Patient has good air entry bilaterally with bronchovesicular sounds. No adventious sounds audible. GI & : Soft, no tenderness elicited and no viscera are palpable. Bowel sounds are positive and there is no renal bruits audible. EXTREMITIES:  There is no radio-femoral delay . All vessels are normally palpable and there are no femoral bruits audible. Patient does not have dependent edema. DERM: No skin rash. CNS: No gross motor neurological deficit. MUSCULOSKELETAL: No joint swelling notice and patient has normal range of motion in lower extremity.       Intake/Output Summary (Last 24 hours) at 08/19/18 1127  Last data filed at 08/19/18 0532   Gross per 24 hour   Intake              500 ml   Output                0 ml   Net              500 ml     Wt Readings from Last 7 Encounters:   08/19/18 130 kg (287 lb 6.4 oz)   06/11/18 (!) 137 kg (301 lb)   05/09/18 (!) 145 kg (319 lb 10.7 oz)   05/09/18 (!) 145 kg (319 lb 10.7 oz)   05/07/18 (!) 145 kg (319 lb)   01/24/18 (!) 170 kg (374 lb)   01/15/18 (!) 170 kg (375 lb 8 oz)     LABS:     Results from last 7 days  Lab Units 08/19/18  0330   WBC 10*3/mm3 13.85*   HEMOGLOBIN g/dL 15.1   HEMATOCRIT % 47.8*   PLATELETS 10*3/mm3 303       Results from last 7 days  Lab Units 08/19/18  0330   SODIUM mmol/L 144   POTASSIUM mmol/L 3.8   CHLORIDE mmol/L 106   CO2 mmol/L 28.0   BUN mg/dL 20   CREATININE mg/dL 0.60   GLUCOSE mg/dL 127*   CALCIUM mg/dL 10.9*       Results from last 7 days  Lab Units 08/19/18  0330   TROPONIN I ng/mL <0.012              Lab Results   Component Value Date    HGBA1C 5.8 06/11/2018       Results from last 7 days  Lab Units 08/19/18  0330   TSH mIU/mL 1.790   FREE T4 ng/dL 1.24       RADIOLOGY: Imaging Results (last 24 hours)     Procedure Component Value Units Date/Time    XR Chest 2 View [[id number]] Collected:  08/19/18 0718      Updated:  08/19/18 0721    Narrative:       PROCEDURE: XR CHEST 2 VW-     HISTORY: palpitations     COMPARISON: September 6, 2017.     FINDINGS: Electrodes overlie the chest. The heart is normal in size. The  mediastinum is unremarkable. The lungs are clear. There is no  pneumothorax.  There are no acute osseous abnormalities.           Impression:       No acute cardiopulmonary process.     Continued followup is recommended.     This report was finalized on 8/19/2018 7:19 AM by Boogie Reilly DO.          Allergies   Allergen Reactions   • Fish Allergy Anaphylaxis, Itching, Nausea And Vomiting and Nausea Only   • Neomycin-Bacitracin Zn-Polymyx Hives   • Ciprofloxacin-Dexamethasone Hives   • Penicillins Palpitations       cetirizine 10 mg Oral Nightly   diltiaZEM  mg Oral Q24H   enoxaparin 1 mg/kg Subcutaneous Q12H   insulin aspart 0-9 Units Subcutaneous 4x Daily AC & at Bedtime   losartan 100 mg Oral Daily   mupirocin  Topical Q24H   oxybutynin XL 10 mg Oral Daily   pantoprazole 40 mg Oral QAM   Vitamin D 1,000 Units Oral Daily        ASSESSMENT /PLAN:    1. Patient is 47 y.o. with risk profile for atherosclerotic cardiovascular disease as outlined previously, who was hospitalized on account of paroxysmal atrial fibrillation which has reverted to normal sinus rhythm spontaneously and she only required AV blocker therapy.  Patient appears to have multiple substrates including significant hypertension with potential hypertensive heart disease as well as potential less than optimal therapy for known sleep-disordered breathing given substantial fluctuation in her weight gait over last decade and apparently has not undergone any surveillance studies since then.  Her historical presentation would argue against pulmonary thromboembolism as well.  Pathophysiology of atrial dysrhythmia and its implications of been discussed with the patient in depth.  She would likely merit optimized blood pressure control as well as  surveillance studies for optimized setting of sleep-disordered breathing.  Patient at this stage would not merit antiarrhythmic therapy though there is potential need for anticoagulation therapy given poorly controlled hypertension as well as diabetes as conceivably patient may have had recurrent episodes of relatively asymptomatic atrial dysrhythmia.  Patient, who has previously undergone colonoscopy which apparently did not demonstrate significant pathology, may contemplate institution of newer and novel anticoagulant agents which would offer better efficacy and better safety profile with understanding that there are no quick antidote's.  Patient should consider anticoagulation for possibly 4 weeks or so and if her surveillance ambulated Holter monitoring does not demonstrate recurrent dysrhythmia after optimize therapy for sleep-disordered breathing and off for optimal blood pressure control then patient may not merit long-term anticoagulant therapy.  All these issues including better efficacy and better safety of anticoagulants has been discussed with the patient with understanding that even with negative colonoscopies, there is certainly some potential for GI hemorrhage.  2. Patient appears to have less than optimal blood pressure control for a patient with underlying diabetes.  She would likely merit introduction of thiazide therapy so long as she does not have hyperuricemia preferably in combination with losartan as well as optimized dosages of long acting calcium antagonist therapy as well.  Patient has not used significant nonsteroidal therapy in the recent past and other than potential less than optimal therapy for sleep-disordered breathing, patient does not appear to have any additional potential contributing mechanism of her blood pressure.  Patient as before would merit nonpharmacological interventions including continued weight loss.  3. Patient is well aware of management of potential dyslipidemia,  known diabetes and truncal obesity and she does pursue exercise program. Patient, who is diabetic, would merit empiric introduction of statin and eventually she should undergo repeat lipid studies and is well aware of surveillance for potential muscle and liver side effects. Issues related with truncal obesity such as glucose intolerance and metabolic syndrome were discussed with patient in detail as well. Patient was given opportunity to ask questions, which were all, answered appropriately.                     In closing, I sincerely appreciate opportunity to participate in care of this patient. If I can be of any further assistance with the management of this patient, please don’t hesitate to contact me.    ARUN MICHAUD M.D. [facility]

## 2018-08-19 NOTE — PLAN OF CARE
Problem: Patient Care Overview  Goal: Plan of Care Review  Outcome: Ongoing (interventions implemented as appropriate)   08/19/18 1110   Coping/Psychosocial   Plan of Care Reviewed With patient   Plan of Care Review   Progress no change   OTHER   Outcome Summary Patient new admit from ED for a fib with RVR. Patient currently in NSR, no complaints, no issues

## 2018-08-19 NOTE — H&P
[facility]    Name: Azalea Mendoza, 47 y.o. female  MRN: [id number], : 1970   Date of Admission: 2018   PCP: Shelby Hatfield MD     Chief Complaint: palpitations    History of Present Illness: Azalea Mendoza is a(n) 47 y.o. year old female with a history of PVCs (followed by Dr. Mariano), morbid obesity, asthma, DM2, GERD, HTN, thyroid nodule who presents as admission from home to [facility] ER with palpitations that woke her from sleep at 245am. Denied chest pain, dyspnea. Found to be in afib with RVR on arrival to ER. Patient denies any flushing, diarrhea/constipation, tremors, hair or skin changes. She does have a thyroid nodule which is being followed. Does drink caffeinated beverages.    ER course:   VS: /92   Pulse 108   Temp 98.4 °F (36.9 °C) (Oral)   Resp 18   Ht 170.2 cm (67\")   Wt 134 kg (296 lb)   SpO2 99%   BMI 46.36 kg/m²    Meds: cardizem 15mg IV and 60mg PO. NS 500ml  Abnl Labs: trop neg x 1; BNP wnl; WBC 13K  Studies:  CXR: (my read): no focal infiltrates, no pleural effusion, no pneumothorax  EKG: (my read), compared to EKG from: 3/19/14. Rate: 120bpm / Rhythm: afib with RVR / Axis: nl / ST/T changes: no / Hypertrophy: no / QTc: 465ms     Patient seen at 515am on 2018. History was provided by: chart review, discussion with ER provider (Dr. Babin), and patient.     Recent hospitalization?: no     ==============================================================================================================================================================================================================   History:   ROS: all other systems reviewed and are negative  PMHx: Patient  has a past medical history of Acute bronchitis with bronchospasm; Acute maxillary sinusitis; Acute suppurative otitis media of both ears without spontaneous rupture of tympanic membranes; Allergic rhinitis due to pollen; Arthritis; Asthma; Back " pain, lumbosacral; Cervicalgia; Cyst of breast; Diabetes mellitus (CMS/HCC); Esophagitis; GERD (gastroesophageal reflux disease); H/O cystoscopy; Hyperlipidemia; Hypertension; IBS (irritable bowel syndrome); Kidney infection; Lump or mass in breast; Muscle spasm; Ovarian cancer (CMS/HCC); Right knee pain; Shingles; Sleep apnea; Thyroid nodule; and UTI (urinary tract infection).   PSHx: Patient  has a past surgical history that includes Colonoscopy; Cystoscopy; Total abdominal hysterectomy w/ bilateral salpingoophorectomy; Cyst Removal; and Tumor removal.   FamHx: Patient family history includes Arthritis in her father and mother; Cancer in her other; Diabetes in her father and mother; Hyperlipidemia in her father; Hypertension in her father and mother; Osteoporosis in her maternal grandmother; Stroke in her father.   SocHx: Patient  reports that she has never smoked. She has never used smokeless tobacco. She reports that she does not drink alcohol or use drugs.   Allergies: Patient is allergic to fish allergy; neomycin-bacitracin zn-polymyx; ciprofloxacin-dexamethasone; and penicillins.   Medications:   No current facility-administered medications on file prior to encounter.      Current Outpatient Prescriptions on File Prior to Encounter   Medication Sig Dispense Refill   • cetirizine (zyrTEC) 10 MG tablet Take 1 tablet (10 mg total) by mouth Every Night 90 tablet 3   • diltiaZEM CD (CARDIZEM CD) 360 MG 24 hr capsule Take 1 capsule (360 mg total) by mouth Daily 90 capsule 3   • EPIPEN 2-DAVID 0.3 MG/0.3ML solution auto-injector injection USE AS INSTRUCTED 1 each 1   • glucose blood test strip 1 each by Other route 3 (Three) Times a Day Use as instructed 300 each 3   • hydrOXYzine (ATARAX) 25 MG tablet Take 1 tablet by mouth Every 6 (Six) Hours As Needed for Itching. 60 tablet 2   • losartan (COZAAR) 100 MG tablet Take 1 tablet (100 mg total) by mouth Daily 90 tablet 3   • metFORMIN ER (GLUCOPHAGE-XR) 500 MG 24 hr  "tablet Take 1 tablet by mouth 2 (Two) Times a Day Before Meals. 360 tablet 3   • mupirocin (BACTROBAN) 2 % cream Apply topically 3 (Three) Times a Day 15 g 6   • omeprazole (priLOSEC) 20 MG capsule Take 1 capsule (20 mg total) by mouth Daily 90 capsule 3   • tolterodine LA (DETROL LA) 4 MG 24 hr capsule Take 1 capsule by mouth Daily. 90 capsule 1   • Vitamin D, Cholecalciferol, 1000 units tablet Take  by mouth.          ==============================================================================================================================================================================================================   Vitals:   /92   Pulse 108   Temp 98.4 °F (36.9 °C) (Oral)   Resp 18   Ht 170.2 cm (67\")   Wt 134 kg (296 lb)   SpO2 99%   BMI 46.36 kg/m²    SpO2: 99 %   No intake or output data in the 24 hours ending 08/19/18 0505     Physical Exam:   General Appearance:  Morbidly obese middle aged female; Alert and cooperative, not in any acute distress.   Head:  Atraumatic and normocephalic, without obvious abnormality.   Eyes:          PERRL, conjunctivae and sclerae normal, no Icterus. No pallor. Extra-occular movements are within normal limits.   Ears:  Ears appear intact with no abnormalities noted.   Throat: No oral lesions, no thrush, oral mucosa moist.   Neck: Supple, trachea midline   Back:   No kyphoscoliosis present. No tenderness to palpation, range of motion normal.   Lungs:   Chest shape is normal. Breath sounds heard bilaterally equally.  No crackles or wheezing. No Pleural rub or bronchial breathing.   Heart:  Irregularly irregular; S1 and S2, no murmur, no gallop, no rub.   Abdomen:   Obese abdomen; Normal bowel sounds, no masses. Soft, non-tender, non-distended, no guarding, no rebound tenderness.   Genitourinary: deferred   Extremities: Moves all extremities well, no edema, no cyanosis, no clubbing. Left middle toe ingrown toenail s/p removal - mild erythema at distal tip of " skin, suspect pressure and moisture from overlying  bandaid   Pulses: Pulses palpable and equal bilaterally.   Skin: No bleeding, bruising or rash.   Lymph nodes: No palpable adenopathy.   Neurologic: Alert and oriented x 3. Moves all four limbs equally. No tremors. No facial asymetry.       Labs:     Results from last 7 days  Lab Units 08/19/18  0330   SODIUM mmol/L 144   POTASSIUM mmol/L 3.8   CHLORIDE mmol/L 106   CO2 mmol/L 28.0   BUN mg/dL 20   CREATININE mg/dL 0.60   CALCIUM mg/dL 10.9*   BILIRUBIN mg/dL 0.4   ALK PHOS U/L 126   ALT (SGPT) U/L 15   AST (SGOT) U/L 23   GLUCOSE mg/dL 127*       Results from last 7 days  Lab Units 08/19/18  0330   WBC 10*3/mm3 13.85*   HEMOGLOBIN g/dL 15.1   HEMATOCRIT % 47.8*   PLATELETS 10*3/mm3 303       Results from last 7 days  Lab Units 08/19/18  0330   TROPONIN I ng/mL <0.012       Results from last 7 days  Lab Units 08/19/18  0330   PROBNP pg/mL 51.3     ==============================================================================================================================================================================================================   Assessment/Plan:   Azalea Mendoza is a(n) 47 y.o. year old female with:     1. Atrial fibrillation with rapid ventricular rate, new-onset  · S/p cardizem 15mg IV and 60mg PO with variable rate 90s-140s nonsustained  · Continue PO cardizem (home medication)  · Check ECHO for valve function  · Start lovenox 1mg/kg q12h for now, possibly change to oral AC pending ECHO findings  · Check TSH/fT4, trend troponin  · Monitor on telemetry  · May need to consult cardiology     2. DM2, controlled, a1c 5.8% (6/2018). On metformin at home, will hold. Ssi. accuchecks ac and hs.    3. Leukocytosis - inflammatory vs infectious due to #4. Repeat labs in AM.    4. Left middle toe ingrown toenail s/p resection  · Packing to stay in place per podiatrist  · Monitor for cellulitis/drainage  · mild erythema at distal tip of skin,  suspect pressure and moisture from overlying bandaid removed and will allow to air-out the moisture    // F/E/N: carb controlled diet  // DVT PPx: SCDs  // GI PPx: not indicated    // Code Status: FULL CODE   // NOK: Cedrick Mendoza () [phone]  // Dispo: admission, observation, need for hospitalization: afib with RVR, new-onset    Assessment and plan was discussed with the patient and her mother. All questions were answered.     Time in: 515am Time out: 555am   Date patient seen: 8/19/2018     Kristy Paz MD   5:05 AM on 8/19/2018

## 2018-08-19 NOTE — ED PROVIDER NOTES
Subjective   History of Present Illness   47-year-old female with a history of diabetes, hypertension presenting with palpitations.  States she woke up this evening with heart pounding and going rapidly.  Denies any recent chest pain, trouble breathing, leg swelling, change in medications.  Noted to be in new onset atrial fibrillation on arrival.    Review of Systems   Cardiovascular: Positive for palpitations.   All other systems reviewed and are negative.      Past Medical History:   Diagnosis Date   • Acute bronchitis with bronchospasm    • Acute maxillary sinusitis    • Acute suppurative otitis media of both ears without spontaneous rupture of tympanic membranes    • Allergic rhinitis due to pollen    • Arthritis    • Asthma    • Back pain, lumbosacral    • Cervicalgia    • Cyst of breast    • Diabetes mellitus (CMS/HCC)    • Esophagitis    • GERD (gastroesophageal reflux disease)    • H/O cystoscopy     diagnostic cystoscopy   • Hyperlipidemia    • Hypertension    • IBS (irritable bowel syndrome)    • Kidney infection    • Lump or mass in breast    • Muscle spasm    • Ovarian cancer (CMS/HCC)    • Right knee pain    • Shingles    • Sleep apnea    • Thyroid nodule    • UTI (urinary tract infection)        Allergies   Allergen Reactions   • Fish Allergy Anaphylaxis, Itching, Nausea And Vomiting and Nausea Only   • Neomycin-Bacitracin Zn-Polymyx Hives   • Ciprofloxacin-Dexamethasone Hives   • Penicillins Palpitations       Past Surgical History:   Procedure Laterality Date   • COLONOSCOPY     • CYST REMOVAL     • CYSTOSCOPY     • TOTAL ABDOMINAL HYSTERECTOMY WITH SALPINGO OOPHORECTOMY     • TUMOR REMOVAL         Family History   Problem Relation Age of Onset   • Hypertension Mother    • Diabetes Mother         type 2   • Arthritis Mother    • Arthritis Father    • Hypertension Father    • Hyperlipidemia Father    • Diabetes Father         type 2   • Stroke Father    • Cancer Other    • Osteoporosis Maternal  Grandmother        Social History     Social History   • Marital status:      Social History Main Topics   • Smoking status: Never Smoker   • Smokeless tobacco: Never Used   • Alcohol use No   • Drug use: No   • Sexual activity: Defer     Other Topics Concern   • Not on file           Objective   Physical Exam   Constitutional: She is oriented to person, place, and time. She appears well-developed and well-nourished. No distress.   HENT:   Head: Normocephalic.   Mouth/Throat: Oropharynx is clear and moist. No oropharyngeal exudate.   Eyes: No scleral icterus.   Neck: Neck supple. No tracheal deviation present.   Cardiovascular: Normal heart sounds and intact distal pulses.  Exam reveals no gallop and no friction rub.    No murmur heard.  Irregularly irregular.   Pulmonary/Chest: Effort normal and breath sounds normal. No stridor. No respiratory distress. She has no wheezes. She has no rales. She exhibits no tenderness.   Abdominal: Soft. She exhibits no distension and no mass. There is no tenderness. There is no rebound and no guarding.   Musculoskeletal: She exhibits no edema or deformity.   Neurological: She is alert and oriented to person, place, and time.   Skin: Skin is warm and dry. She is not diaphoretic. No erythema. No pallor.   Psychiatric: She has a normal mood and affect. Her behavior is normal.   Nursing note and vitals reviewed.      Procedures           ED Course                  MDM  47-year-old female here with palpitations in the setting of new onset atrial fibrillation with a heart rate in the 100s to 120s.  Vital signs otherwise were all for hypertension.  Patient appears well.  We'll send lab work, start on diltiazem, and reassess.    5:20 AM Labs and chest x-ray were overall unremarkable.  Patient's heart rate is down to the 80s to 120s after 15 mg of IV diltiazem and this is been followed by 60 mg of oral diltiazem.  Discussed with the hospitalist who agrees to admit for further  management.    Final diagnoses:   Atrial fibrillation, unspecified type (CMS/HCC)            Jass Babin MD  08/19/18 0570

## 2018-08-19 NOTE — PROGRESS NOTES
[facility]    Name:  Azalea Mendoza   Age:  47 y.o.  Sex:  female  :  1970  MRN:  [id number]   Visit Number:  [id number]  Admission Date:  2018  Date Of Service:  18  Primary Care Physician:  Shelby Hatfield MD    Patient was seen and examined. Pertinent laboratory and radiology data were reviewed.    Vital signs:    Vital Signs (last 24 hours)        0700  -   0659  0700  -   1343   Most Recent    Temp (°F)   98.4      98.6     98.6 (37)    Heart Rate 80 -  (!)129    69 -  86     86    Resp        18    /82 -  (!) 207/106    127/80 -  (!) 182/103     (!) 182/103    SpO2 (%) 96 -  100    94 -  98     98          Patient just admitted earlier this morning. She was is in ER most of the morning, awaiting a bed. Dr Mcrae is at bedside. The patient has elevated blood pressure still. Heart rate improving with titrated Cardizem. Dr Mcrae has increased her oral Diltiazem dose from her previous home dose. Hydralazine also added prn elevated blood pressure if no improvement. She was given her ARB. Continue to monitor on telemetry. Adjust medications accordingly.     Keshia Mariee DO  18  1:43 PM

## 2018-08-20 ENCOUNTER — APPOINTMENT (OUTPATIENT)
Dept: CARDIOLOGY | Facility: HOSPITAL | Age: 48
End: 2018-08-20
Attending: INTERNAL MEDICINE

## 2018-08-20 VITALS
SYSTOLIC BLOOD PRESSURE: 136 MMHG | RESPIRATION RATE: 16 BRPM | TEMPERATURE: 98.6 F | DIASTOLIC BLOOD PRESSURE: 83 MMHG | OXYGEN SATURATION: 98 % | HEART RATE: 64 BPM | WEIGHT: 287.4 LBS | HEIGHT: 67 IN | BODY MASS INDEX: 45.11 KG/M2

## 2018-08-20 LAB
BASOPHILS # BLD AUTO: 0.04 10*3/MM3 (ref 0–0.2)
BASOPHILS NFR BLD AUTO: 0.4 % (ref 0–2.5)
BH CV ECHO MEAS - EF(MOD-BP): 69 %
BH CV ECHO MEAS - RVSP: 43 MMHG
DEPRECATED RDW RBC AUTO: 44.8 FL (ref 37–54)
EOSINOPHIL # BLD AUTO: 0.16 10*3/MM3 (ref 0–0.7)
EOSINOPHIL NFR BLD AUTO: 1.4 % (ref 0–7)
ERYTHROCYTE [DISTWIDTH] IN BLOOD BY AUTOMATED COUNT: 13.9 % (ref 11.5–14.5)
GLUCOSE BLDC GLUCOMTR-MCNC: 127 MG/DL (ref 70–130)
GLUCOSE BLDC GLUCOMTR-MCNC: 98 MG/DL (ref 70–130)
HCT VFR BLD AUTO: 44.2 % (ref 37–47)
HGB BLD-MCNC: 14.4 G/DL (ref 12–16)
IMM GRANULOCYTES # BLD: 0.04 10*3/MM3 (ref 0–0.06)
IMM GRANULOCYTES NFR BLD: 0.4 % (ref 0–0.6)
LYMPHOCYTES # BLD AUTO: 3.16 10*3/MM3 (ref 0.6–3.4)
LYMPHOCYTES NFR BLD AUTO: 28.4 % (ref 10–50)
MAXIMAL PREDICTED HEART RATE: 174 BPM
MCH RBC QN AUTO: 28.3 PG (ref 27–31)
MCHC RBC AUTO-ENTMCNC: 32.6 G/DL (ref 30–37)
MCV RBC AUTO: 86.8 FL (ref 81–99)
MONOCYTES # BLD AUTO: 0.46 10*3/MM3 (ref 0–0.9)
MONOCYTES NFR BLD AUTO: 4.1 % (ref 0–12)
NEUTROPHILS # BLD AUTO: 7.28 10*3/MM3 (ref 2–6.9)
NEUTROPHILS NFR BLD AUTO: 65.3 % (ref 37–80)
NRBC BLD MANUAL-RTO: 0 /100 WBC (ref 0–0)
PLATELET # BLD AUTO: 284 10*3/MM3 (ref 130–400)
PMV BLD AUTO: 10.3 FL (ref 6–12)
RBC # BLD AUTO: 5.09 10*6/MM3 (ref 4.2–5.4)
STRESS TARGET HR: 148 BPM
WBC NRBC COR # BLD: 11.14 10*3/MM3 (ref 4.8–10.8)

## 2018-08-20 PROCEDURE — 82962 GLUCOSE BLOOD TEST: CPT

## 2018-08-20 PROCEDURE — G0378 HOSPITAL OBSERVATION PER HR: HCPCS

## 2018-08-20 PROCEDURE — 85025 COMPLETE CBC W/AUTO DIFF WBC: CPT | Performed by: HOSPITALIST

## 2018-08-20 PROCEDURE — 99217 PR OBSERVATION CARE DISCHARGE MANAGEMENT: CPT | Performed by: INTERNAL MEDICINE

## 2018-08-20 PROCEDURE — 93306 TTE W/DOPPLER COMPLETE: CPT

## 2018-08-20 RX ORDER — ATORVASTATIN CALCIUM 10 MG/1
10 TABLET, FILM COATED ORAL NIGHTLY
Qty: 30 TABLET | Refills: 0 | Status: SHIPPED | OUTPATIENT
Start: 2018-08-20 | End: 2018-09-20 | Stop reason: SDUPTHER

## 2018-08-20 RX ORDER — DILTIAZEM HYDROCHLORIDE 120 MG/1
120 CAPSULE, COATED, EXTENDED RELEASE ORAL DAILY
Qty: 30 CAPSULE | Refills: 0 | Status: SHIPPED | OUTPATIENT
Start: 2018-08-20 | End: 2018-08-21

## 2018-08-20 RX ORDER — TRIAMTERENE AND HYDROCHLOROTHIAZIDE 37.5; 25 MG/1; MG/1
1 TABLET ORAL DAILY
Qty: 30 TABLET | Refills: 0 | Status: SHIPPED | OUTPATIENT
Start: 2018-08-21

## 2018-08-20 RX ADMIN — VITAMIN D, TAB 1000IU (100/BT) 1000 UNITS: 25 TAB at 08:11

## 2018-08-20 RX ADMIN — MUPIROCIN: 2 CREAM TOPICAL at 08:15

## 2018-08-20 RX ADMIN — TRIAMTERENE AND HYDROCHLOROTHIAZIDE 1 TABLET: 37.5; 25 TABLET ORAL at 08:12

## 2018-08-20 RX ADMIN — PANTOPRAZOLE SODIUM 40 MG: 40 TABLET, DELAYED RELEASE ORAL at 06:37

## 2018-08-20 RX ADMIN — ACETAMINOPHEN 650 MG: 325 TABLET, FILM COATED ORAL at 06:41

## 2018-08-20 RX ADMIN — DILTIAZEM HYDROCHLORIDE 420 MG: 240 CAPSULE, COATED, EXTENDED RELEASE ORAL at 08:11

## 2018-08-20 RX ADMIN — OXYBUTYNIN CHLORIDE 10 MG: 5 TABLET, FILM COATED, EXTENDED RELEASE ORAL at 08:12

## 2018-08-20 RX ADMIN — APIXABAN 5 MG: 2.5 TABLET, FILM COATED ORAL at 09:53

## 2018-08-20 RX ADMIN — LOSARTAN POTASSIUM 100 MG: 50 TABLET, FILM COATED ORAL at 08:12

## 2018-08-20 NOTE — PLAN OF CARE
Problem: Syncope (Adult)  Goal: Identify Related Risk Factors and Signs and Symptoms  Outcome: Outcome(s) achieved Date Met: 08/20/18 08/20/18 0140   Syncope (Adult)   Related Risk Factors (Syncope) dysrhythmia     Goal: Physical Safety/Health Maintenance  Outcome: Ongoing (interventions implemented as appropriate)    Goal: Optimal Emotional/Functional [place]  Outcome: Ongoing (interventions implemented as appropriate)      Problem: Diabetes, Type 2 (Adult)  Goal: Signs and Symptoms of Listed Potential Problems Will be Absent, Minimized or Managed (Diabetes, Type 2)  Outcome: Ongoing (interventions implemented as appropriate)

## 2018-08-20 NOTE — PROGRESS NOTES
Continued Stay Note  [facility]     Patient Name: Azalea Mendoza  MRN: [id number]  Today's Date: 8/20/2018    Admit Date: 8/19/2018          Discharge Plan     Row Name 08/20/18 1303       Plan    Plan Consult due to medication access. Patient has coverage per her insurance formulary. Provided and discussed copay card. Reviewed sample and patient assistance procedures.    Row Name 08/20/18 1237       Plan    Final Note Discharged home     Row Name 08/20/18 1236       Plan    Final Discharge Disposition Code 01 - home or self-care    Row Name 08/20/18 1014       Plan    Plan Home     Patient/Family in Agreement with Plan yes    Plan Comments Spoke to pt regarding discahrge plans She works fulltime Has a CPAP supllies from [employer] confirmed address and phone number Denies any other needs               Discharge Codes    No documentation.       Expected Discharge Date and Time     Expected Discharge Date Expected Discharge Time    Aug 20, 2018             Marcia Louise

## 2018-08-20 NOTE — PROGRESS NOTES
Cardiology Progress Note  Jimbo Mcrae MD  PATIENT: Azalea Mendoza : 1970   DATE: 18   327/1    Patient Care Team:  Shelby Hatfield MD as PCP - General  [facility], Kang COREA MD as Consulting Physician (General Surgery)    Subjective .  Patient has maintained normal sinus rhythm without antiarrhythmic therapy.  Her blood pressure has been less than optimally controlled.  Patient denies shortness of breath or chest pain.  She was able to use her home CPAP at her conventional settings.    Objective     Vital Sign Min/Max for last 24 hours  Temp  Min: 98 °F (36.7 °C)  Max: 98.9 °F (37.2 °C)   BP  Min: 131/79  Max: 182/103   Pulse  Min: 59  Max: 86   Resp  Min: 17  Max: 18   SpO2  Min: 96 %  Max: 99 %   No Data Recorded   Weight  Min: 130 kg (287 lb 6.4 oz)  Max: 130 kg (287 lb 6.4 oz)     Wt Readings from Last 7 Encounters:   18 130 kg (287 lb 6.4 oz)   18 (!) 137 kg (301 lb)   18 (!) 145 kg (319 lb 10.7 oz)   18 (!) 145 kg (319 lb 10.7 oz)   18 (!) 145 kg (319 lb)   18 (!) 170 kg (374 lb)   01/15/18 (!) 170 kg (375 lb 8 oz)          Physical Exam:    CONSTITUTIONAL: Not in acute distress.    NECK: Carotid upstroke is normal, there are no carotid bruits, no JVD, no thyromegaly, no cervical or axillary lymphadenopathy.     HEART: [place] beat is not displaced, no thrill is appreciated and both heart sounds are normal.  There is no murmur or rub audible.     BRONCHOPULMONARY: Patient has good air entry bilaterally with bronchovesicular sounds. No adventious sounds audible.     GI & : Soft, no tenderness elicited and no viscera are palpable. Bowel sounds are positive and there is no renal bruits audible.     No intake or output data in the 24 hours ending 18 0830    Results Review:    LABS:     Results from last 7 days  Lab Units 18  0540 18  0330   WBC 10*3/mm3 11.14* 13.85*   HEMOGLOBIN g/dL 14.4 15.1   HEMATOCRIT % 44.2 47.8*   PLATELETS 10*3/mm3  284 303     I reviewed the patient's new clinical results.    Results from last 7 days  Lab Units 08/20/18  0540 08/19/18  0330   WBC 10*3/mm3 11.14* 13.85*   HEMOGLOBIN g/dL 14.4 15.1   HEMATOCRIT % 44.2 47.8*   PLATELETS 10*3/mm3 284 303       Results from last 7 days  Lab Units 08/19/18  0330   SODIUM mmol/L 144   POTASSIUM mmol/L 3.8   CHLORIDE mmol/L 106   CO2 mmol/L 28.0   BUN mg/dL 20   CREATININE mg/dL 0.60   GLUCOSE mg/dL 127*   CALCIUM mg/dL 10.9*       Results from last 7 days  Lab Units 08/19/18  1602 08/19/18  1232 08/19/18  0330   TROPONIN I ng/mL <0.012 <0.012 <0.012              Lab Results   Component Value Date    HGBA1C 5.8 06/11/2018       Results from last 7 days  Lab Units 08/19/18  0330   TSH mIU/mL 1.790   FREE T4 ng/dL 1.24           RADIOLOGY: Imaging Results (last 24 hours)     ** No results found for the last 24 hours. **                 Allergies   Allergen Reactions   • Fish Allergy Anaphylaxis, Itching, Nausea And Vomiting and Nausea Only   • Neomycin-Bacitracin Zn-Polymyx Hives   • Ciprofloxacin-Dexamethasone Hives   • Penicillins Palpitations       apixaban 5 mg Oral Q12H   atorvastatin 10 mg Oral Nightly   cetirizine 10 mg Oral Nightly   diltiaZEM  mg Oral Q24H   insulin aspart 0-9 Units Subcutaneous 4x Daily AC & at Bedtime   losartan 100 mg Oral Daily   mupirocin  Topical Q24H   oxybutynin XL 10 mg Oral Daily   pantoprazole 40 mg Oral QAM   triamterene-hydrochlorothiazide 1 tablet Oral Daily   Vitamin D 1,000 Units Oral Daily        Principal Problem:    Atrial fibrillation with rapid ventricular response (CMS/HCC)  Active Problems:    Type 2 diabetes mellitus with diabetic neuropathy, without long-term current use of insulin (CMS/HCC)    Leukocytosis    Assessment/Plan     1. Patient, who is substrates for atrial dysrhythmia comprising of both significant hypertensive heart disease and sleep-disordered breathing with likely less than optimal settings with suggestion of  pulmonary hypertension, has maintained normal sinus rhythm and would not merit antiarrhythmic therapy given both hypertension and diabetes, it was felt that the patient would benefit from introduction of anticoagulant therapy and eventually she should undergo Holter monitoring for weeks later and if she does not have recurrent dysrhythmias after optimize therapy for sleep-disordered breathing and improved blood pressure control then she may not merit lifelong anticoagulant therapy.  2. Patient has history of significant hypertension with no definitive secondary etiologies of hypertension.  She was recommended introduction of combination diuretic as well as up titrated dosages of her long acting calcium antagonist therapy.  Methodology of self home monitoring at been discussed the patient in maximal possible detail.  3. Risk reduction.  Patient fortunately is nonsmoker.  Given the fact that she is diabetic she wouldn't merit long-term statin.    I discussed the patients findings and my recommendations with the patient and family.    Jimbo Mcrae MD  08/20/18  8:30 AM

## 2018-08-20 NOTE — DISCHARGE SUMMARY
[facility]   DISCHARGE SUMMARY      Name:  Azalea Mendoza   Age:  47 y.o.  Sex:  female  :  1970  MRN:  [id number]   Visit Number:  [id number]    Admission Date:  2018  Date of Discharge:  2018  Primary Care Physician:  Shelby Hatfield MD    Discharge Diagnoses:   1. New onset Atrial fibrillation with RVR, now in sinus rhythm  2. Essential hypertension  3. Type 2 DM, well controlled  4. Morbid obesity    Principal Problem:    Atrial fibrillation with rapid ventricular response (CMS/HCC)  Active Problems:    Type 2 diabetes mellitus with diabetic neuropathy, without long-term current use of insulin (CMS/HCC)    Leukocytosis      Presenting Problem:    Atrial fibrillation (CMS/HCC) [I48.91]     Consults:     Consults     Date and Time Order Name Status Description    2018 1046 Inpatient Cardiology Consult          Consulting Physician(s)     Provider Relationship Specialty    Jimbo Mcrae MD Consulting Physician Cardiology          Procedures Performed:           History of presenting illness/Hospital Course:  Hypertension and history of frequent PVCs all ordered by Dr. Mariano who initially presented to the ER with complaints of palpitations on the day of admission.  Patient did not have any other related symptoms of chest pain or shortness of breath.  She was found to be in A. fib with RVR upon arrival to the ER.  EKG did not reveal any significant ST-T changes other than uncontrolled rate with A. fib.  Her labs were essentially unremarkable other than an elevated WBC count of 13 K.  Troponin was negative.  She was given IV Cardizem as well as oral Cardizem.  She takes oral Cardizem at home for her frequent PVCs and blood pressure.  She was started on Lovenox and an echocardiogram was ordered to evaluate for her valvular functions.  Dr. Mcrae from cardiology was also consulted.  Given her risk factors she does meet criteria for anticoagulation and has  been started on oral Eliquis which is what she will be discharged home 1.  Patient continued to have extremely elevated blood pressures for which she was started on a diuretic in the form of Maxzide as well.  Since initiation of this her blood pressure is much better control.  She was continued on Cozaar 100 mg.  Dr. Mcrae did recommend initiation of Lipitor 10 mg given that she is also a diabetic.     Patient is seen and examined this morning.  She reports feeling well and has no complaints of chest pain, palpitations or shortness of air.  She is currently in sinus rhythm with heart rate in the 60s-70s.  Her blood pressure is markedly improved and is currently in the 140/80 range.  An echocardiogram was performed this morning which shows normal left ventricular function with a mildly dilated left atrium, full report below.  Patient is stable for discharge home today on the above medications.  Cardizem dose was increased from 360mg at home to 420 mg.  She will follow-up with her own cardiologist within 1-2 weeks of discharge and she was recommended to follow-up with her primary care physician within one week of discharge as well.      Echocardiogram:  Left Ventricle Calculated EF = 69%. Normal left ventricular cavity size and wall thickness noted. All left ventricular wall segments contract normally. Left ventricular diastolic function is normal.      Left Atrium Left atrial cavity size is mildly dilated.      Right Atrium Normal right atrial size noted.      Aortic Valve The aortic valve is structurally normal. No aortic valve regurgitation is present. No aortic valve stenosis is present.      Mitral Valve The mitral valve is normal in structure.      Tricuspid Valve The tricuspid valve is grossly normal. Mild tricuspid valve regurgitation is present. Estimated right ventricular systolic pressure from tricuspid regurgitation is mildly elevated (35-45 mmHg). Calculated right ventricular systolic pressure from  tricuspid regurgitation is 43 mmHg.      Pulmonic Valve The pulmonic valve is grossly normal in structure.      Pericardium The pericardium is normal. There is no evidence of pericardial effusion.            Vital Signs:    Temp:  [98 °F (36.7 °C)-98.9 °F (37.2 °C)] 98.6 °F (37 °C)  Heart Rate:  [59-73] 64  Resp:  [16-18] 16  BP: (136-158)/() 136/83    Physical Exam:    General Appearance:  Alert and cooperative, not in any acute distress.   Head:  Atraumatic and normocephalic, without obvious abnormality.   Eyes:          PERRLA, conjunctivae and sclerae normal, no Icterus. No pallor. Extra-occular movements are within normal limits.   Ears:  Ears appear intact with no abnormalities noted.   Throat: No oral lesions, no thrush, oral mucosa moist.   Neck: Supple, trachea midline, no thyromegaly, no carotid bruit.   Back:   No kyphoscoliosis present. No tenderness to palpation,   range of motion normal.   Lungs:   Chest shape is normal. Breath sounds heard bilaterally equally.  No crackles or wheezing. No Pleural rub or bronchial breathing.   Heart:  Normal S1 and S2, no murmur, no gallop, no rub. No JVD.   Abdomen:   Normal bowel sounds, no masses, no organomegaly. Soft, non-tender, non-distended, no guarding, no rebound tenderness.   Extremities: Moves all extremities well, no edema, no cyanosis, no clubbing.   Pulses: Pulses palpable and equal bilaterally.   Skin: No bleeding, bruising or rash.   Lymph nodes: No palpable adenopathy.   Neurologic: Alert and oriented x 3. Moves all four limbs equally. No tremors. No facial asymetry.     Pertinent Lab Results:       Results from last 7 days  Lab Units 08/19/18  0330   SODIUM mmol/L 144   POTASSIUM mmol/L 3.8   CHLORIDE mmol/L 106   CO2 mmol/L 28.0   BUN mg/dL 20   CREATININE mg/dL 0.60   CALCIUM mg/dL 10.9*   BILIRUBIN mg/dL 0.4   ALK PHOS U/L 126   ALT (SGPT) U/L 15   AST (SGOT) U/L 23   GLUCOSE mg/dL 127*       Results from last 7 days  Lab Units  08/20/18  0540 08/19/18  0330   WBC 10*3/mm3 11.14* 13.85*   HEMOGLOBIN g/dL 14.4 15.1   HEMATOCRIT % 44.2 47.8*   PLATELETS 10*3/mm3 284 303           Results from last 7 days  Lab Units 08/19/18  1602 08/19/18  1232 08/19/18  0330   TROPONIN I ng/mL <0.012 <0.012 <0.012       Results from last 7 days  Lab Units 08/19/18  0330   PROBNP pg/mL 51.3                       Invalid input(s): USDES,  BLOODU, NITRITITE, BACT, EP  Pain Management Panel     Pain Management Panel Latest Ref Rng & Units 11/22/2017 8/2/2017    CREATININE UR Not Estab. mg/dL 47.1 45.2              Pertinent Radiology Results:    Imaging Results (all)     Procedure Component Value Units Date/Time    XR Chest 2 View [[id number]] Collected:  08/19/18 0718     Updated:  08/19/18 0721    Narrative:       PROCEDURE: XR CHEST 2 VW-     HISTORY: palpitations     COMPARISON: September 6, 2017.     FINDINGS: Electrodes overlie the chest. The heart is normal in size. The  mediastinum is unremarkable. The lungs are clear. There is no  pneumothorax.  There are no acute osseous abnormalities.           Impression:       No acute cardiopulmonary process.     Continued followup is recommended.     This report was finalized on 8/19/2018 7:19 AM by Boogie Reilly DO.          Condition on Discharge:      Stable.    Code status during the hospital stay:    Code Status and Medical Interventions:   Ordered at: 08/19/18 0619     Code Status:    CPR     Medical Interventions (Level of Support Prior to Arrest):    Full       Discharge Disposition:    Home or Self Care    Discharge Medications:       Discharge Medications      New Medications      Instructions Start Date   apixaban 2.5 MG tablet tablet  Commonly known as:  ELIQUIS   5 mg, Oral, Every 12 Hours Scheduled      atorvastatin 10 MG tablet  Commonly known as:  LIPITOR   10 mg, Oral, Nightly      triamterene-hydrochlorothiazide 37.5-25 MG per tablet  Commonly known as:  MAXZIDE-25   1 tablet, Oral, Daily          Changes to Medications      Instructions Start Date   diltiaZEM  MG 24 hr capsule  Commonly known as:  CARDIZEM CD  What changed:  Another medication with the same name was added. Make sure you understand how and when to take each.   360 mg, Oral, Daily      diltiaZEM  MG 24 hr capsule  Commonly known as:  CARDIZEM CD  What changed:  You were already taking a medication with the same name, and this prescription was added. Make sure you understand how and when to take each.   120 mg, Oral, Daily         Continue These Medications      Instructions Start Date   cetirizine 10 MG tablet  Commonly known as:  zyrTEC   10 mg, Oral, Nightly      EPIPEN 2-DAVID 0.3 MG/0.3ML solution auto-injector injection  Generic drug:  EPINEPHrine   USE AS INSTRUCTED      glucose blood test strip   1 each, Other, 3 Times Daily, Use as instructed       hydrOXYzine 25 MG tablet  Commonly known as:  ATARAX   25 mg, Oral, Every 6 Hours PRN      losartan 100 MG tablet  Commonly known as:  COZAAR   100 mg, Oral, Daily      metFORMIN  MG 24 hr tablet  Commonly known as:  GLUCOPHAGE-XR   500 mg, Oral, 2 Times Daily Before Meals      mupirocin 2 % cream  Commonly known as:  BACTROBAN   Topical, 3 Times Daily      omeprazole 20 MG capsule  Commonly known as:  priLOSEC   20 mg, Oral, Daily      tolterodine LA 4 MG 24 hr capsule  Commonly known as:  DETROL LA   4 mg, Oral, Daily      Vitamin D (Cholecalciferol) 1000 units tablet   1,000 Units, Oral, Daily         Stop These Medications    meloxicam 15 MG tablet  Commonly known as:  MOBIC            Discharge Diet:     Diet Instructions     Diet: Consistent Carbohydrate, Cardiac       Discharge Diet:   Consistent Carbohydrate  Cardiac             Activity at Discharge:     Activity Instructions     Activity as Tolerated             Follow-up Appointments:    Additional Instructions for the Follow-ups that You Need to Schedule     Discharge Follow-up with PCP    As directed       Currently Documented PCP:  Shelby Hatfield MD  PCP Phone Number:  [phone]    Follow Up Details:  1 week         Discharge Follow-up with Specified Provider: Dr. Chau; 2 Weeks    As directed      To:  Dr. Chau    Follow Up:  2 Weeks            Contact information for follow-up providers     Shelby Hatfield MD .    Specialties:  Internal Medicine, Geriatric Medicine  Why:  1 week  Contact information:  [address]  [address]  [facility] [address]  [phone]                   Contact information for after-discharge care     Durable Medical Equipment     [facility] .    Specialty:  DME Services  Contact information:  [address]  [address]  [facility] [address]  [phone]                             Future Appointments  Date Time Provider Department Center   8/21/2018 11:15 AM Shelby Hatfield MD [facility] None   12/12/2018 3:00 PM Skylar Beckett MD [facility] None       Additional Instructions for the Follow-ups that You Need to Schedule     Discharge Follow-up with PCP    As directed      Currently Documented PCP:  Shelby Hatfield MD  PCP Phone Number:  [phone]    Follow Up Details:  1 week         Discharge Follow-up with Specified Provider: Dr. Chau; 2 Weeks    As directed      To:  Dr. Chau    Follow Up:  2 Weeks               Test Results Pending at Discharge:           Reba Bernard MD  08/20/18  12:00 PM    Time spent: 25 minutes    Dictated utilizing Dragon dictation.

## 2018-08-20 NOTE — PROGRESS NOTES
Case Management Discharge Note    Final Note: Discharged home     Destination     No service has been selected for the patient.      Durable Medical Equipment - Selection Complete     Service Request Status Selected Specialties Address Phone Number Fax Number    [facility] Selected DME Services [address] [place] [address] [phone]      Dialysis/Infusion     No service has been selected for the patient.      Home Medical Care     No service has been selected for the patient.      Social Care     No service has been selected for the patient.        Other:  (private car )    Final Discharge Disposition Code: 01 - home or self-care

## 2018-08-20 NOTE — PLAN OF CARE
Problem: Patient Care Overview  Goal: Plan of Care Review  Outcome: Ongoing (interventions implemented as appropriate)   08/20/18 0142   Coping/Psychosocial   Plan of Care Reviewed With patient   Plan of Care Review   Progress improving   OTHER   Outcome Summary VSS, NSR, NO COMPLAINTS OF PAIN DURING NIGHT,

## 2018-08-21 ENCOUNTER — OFFICE VISIT (OUTPATIENT)
Dept: INTERNAL MEDICINE | Facility: CLINIC | Age: 48
End: 2018-08-21

## 2018-08-21 VITALS
OXYGEN SATURATION: 98 % | DIASTOLIC BLOOD PRESSURE: 72 MMHG | HEART RATE: 73 BPM | SYSTOLIC BLOOD PRESSURE: 139 MMHG | RESPIRATION RATE: 18 BRPM | BODY MASS INDEX: 43.97 KG/M2 | TEMPERATURE: 98.9 F | WEIGHT: 280.75 LBS

## 2018-08-21 DIAGNOSIS — E78.2 MIXED HYPERLIPIDEMIA: ICD-10-CM

## 2018-08-21 DIAGNOSIS — D72.829 LEUKOCYTOSIS, UNSPECIFIED TYPE: ICD-10-CM

## 2018-08-21 DIAGNOSIS — I48.19 PERSISTENT ATRIAL FIBRILLATION (HCC): ICD-10-CM

## 2018-08-21 DIAGNOSIS — E11.9 CONTROLLED TYPE 2 DIABETES MELLITUS WITHOUT COMPLICATION, WITHOUT LONG-TERM CURRENT USE OF INSULIN (HCC): ICD-10-CM

## 2018-08-21 DIAGNOSIS — I10 BENIGN ESSENTIAL HYPERTENSION: Primary | ICD-10-CM

## 2018-08-21 PROCEDURE — 99496 TRANSJ CARE MGMT HIGH F2F 7D: CPT | Performed by: INTERNAL MEDICINE

## 2018-08-21 RX ORDER — DILTIAZEM HYDROCHLORIDE EXTENDED-RELEASE TABLETS 420 MG/1
420 TABLET, EXTENDED RELEASE ORAL DAILY
Qty: 30 TABLET | Refills: 11 | Status: SHIPPED | OUTPATIENT
Start: 2018-08-21 | End: 2018-10-08

## 2018-08-21 NOTE — PROGRESS NOTES
Transitional Care Follow Up Visit  Subjective     Azalea Mendoza is a 47 y.o. female who presents for a transitional care management visit.    Within 48 business hours after discharge our office contacted her via telephone to coordinate her care and needs.      I reviewed and discussed the details of that call along with the discharge summary, hospital problems, inpatient lab results, inpatient diagnostic studies, and consultation reports with Azalea.     Current outpatient and discharge medications have been reconciled for the patient.    No flowsheet data found.  Risk for Readmission (LACE) Score: 5 (8/20/2018  6:00 AM)    Chief Complaint   Patient presents with   • Follow-up     Hospitalization   • Hypertension   • Diabetes   • Atrial Fibrillation       History of Present Illness   Course During Hospital Stay:   Patient is here for a follow up after being admitted on 08/19/2018 and discharged on 08/20/2018 at [facility] for atrial fibrillation new onset, she sees dr su on 8- ,she is currently on eliquis,  patient is also to follow-up on her blood pressure.  She is taking medications as prescribed.  She was noted to be on Cardizem      420mg qd, patient is also to follow-up on her sugar and cholesterol.  She sees endocrinology.  She has a history of leukocytosis.  She states she may have seen hematology in the past.  She has a history of arthritis and sees a rheumatologist.  Her hospital records, labs and radiology reports have been reviewed with her today.  Medications have been updated and reconciled     The following portions of the patient's history were reviewed and updated as appropriate: allergies, current medications, past family history, past medical history, past social history, past surgical history and problem list.    Review of Systems   Constitutional: Negative for appetite change, fatigue and fever.   HENT: Negative for congestion, ear discharge, ear pain, sinus  pressure and sore throat.    Eyes: Negative for pain and discharge.   Respiratory: Negative for cough, chest tightness, shortness of breath and wheezing.    Cardiovascular: Negative for chest pain, palpitations and leg swelling.   Gastrointestinal: Negative for abdominal pain, blood in stool, constipation, diarrhea and nausea.   Endocrine: Negative for cold intolerance and heat intolerance.   Genitourinary: Negative for dysuria, flank pain and frequency.   Musculoskeletal: Negative for back pain and joint swelling.   Skin: Negative for color change.   Allergic/Immunologic: Negative for environmental allergies and food allergies.   Neurological: Negative for dizziness, weakness, numbness and headaches.   Hematological: Negative for adenopathy. Does not bruise/bleed easily.   Psychiatric/Behavioral: Negative for behavioral problems and dysphoric mood. The patient is not nervous/anxious.        Objective   Physical Exam   Constitutional: She is oriented to person, place, and time. She appears well-developed and well-nourished. No distress.   HENT:   Head: Normocephalic and atraumatic.   Right Ear: External ear normal.   Left Ear: External ear normal.   Nose: Nose normal.   Mouth/Throat: Oropharynx is clear and moist.   Eyes: Pupils are equal, round, and reactive to light. Conjunctivae and EOM are normal.   Neck: Neck supple. No thyromegaly present.   Cardiovascular: Normal rate, regular rhythm and normal heart sounds.    Pulmonary/Chest: Effort normal and breath sounds normal. No respiratory distress.   Abdominal: Soft. Bowel sounds are normal. She exhibits no distension. There is no tenderness. There is no rebound.   Musculoskeletal: Normal range of motion. She exhibits no edema.   Lymphadenopathy:     She has no cervical adenopathy.   Neurological: She is alert and oriented to person, place, and time.   No gross motor or sensory deficits   Skin: Skin is warm. She is not diaphoretic.   Psychiatric: She has a normal  mood and affect.   Nursing note and vitals reviewed.      Assessment/Plan   Azalea was seen today for follow-up, hypertension, diabetes and atrial fibrillation.    Diagnoses and all orders for this visit:    Benign essential hypertension    Mixed hyperlipidemia  -     CBC & Differential  -     Comprehensive Metabolic Panel  -     Lipid Panel    Controlled type 2 diabetes mellitus without complication, without long-term current use of insulin (CMS/[facility])  -     Hemoglobin A1c  -     Microalbumin / Creatinine Urine Ratio - Urine, Clean Catch    Persistent atrial fibrillation (CMS/[facility])    Leukocytosis, unspecified type    Other orders  -     diltiazem La (CARDIZEM LA) 420 MG 24 hr tablet; Take 1 tablet by mouth Daily.      Plan:  1.  Benign essential hypertension: Will continue current medication, low-sodium diet advised  2.mixed hyperlipidemia: will obtain   fasting CMP and lipid panel.  Diet and excise counseled,  Will continue current medications  3. Diabetes  Mellitus  : will obtain   fasting CMP  and hba1c  , diet and exercise counseled , Will continue current medications  4.  Leukocytosis: Will monitor, labs reviewed  5.  Atrial fibrillation: Rate controlled on anticoagulation.  Change Cardizem 420 mg daily.  Keep appointment to see cardiology tomorrow

## 2018-09-04 ENCOUNTER — TELEPHONE (OUTPATIENT)
Dept: INTERNAL MEDICINE | Facility: CLINIC | Age: 48
End: 2018-09-04

## 2018-09-11 ENCOUNTER — TELEPHONE (OUTPATIENT)
Dept: SURGERY | Facility: CLINIC | Age: 48
End: 2018-09-11

## 2018-09-12 RX ORDER — ATORVASTATIN CALCIUM 10 MG/1
10 TABLET, FILM COATED ORAL NIGHTLY
Qty: 30 TABLET | Refills: 0 | Status: CANCELLED | OUTPATIENT
Start: 2018-09-12

## 2018-09-20 RX ORDER — ATORVASTATIN CALCIUM 10 MG/1
10 TABLET, FILM COATED ORAL NIGHTLY
Qty: 30 TABLET | Refills: 0 | OUTPATIENT
Start: 2018-09-20 | End: 2019-01-02

## 2018-10-08 ENCOUNTER — OFFICE VISIT (OUTPATIENT)
Dept: PULMONOLOGY | Facility: CLINIC | Age: 48
End: 2018-10-08

## 2018-10-08 VITALS
BODY MASS INDEX: 44.57 KG/M2 | OXYGEN SATURATION: 98 % | RESPIRATION RATE: 16 BRPM | HEIGHT: 67 IN | HEART RATE: 74 BPM | SYSTOLIC BLOOD PRESSURE: 134 MMHG | DIASTOLIC BLOOD PRESSURE: 86 MMHG | WEIGHT: 284 LBS

## 2018-10-08 DIAGNOSIS — E66.01 MORBID OBESITY, UNSPECIFIED OBESITY TYPE (HCC): ICD-10-CM

## 2018-10-08 DIAGNOSIS — G47.33 OBSTRUCTIVE SLEEP APNEA: Primary | ICD-10-CM

## 2018-10-08 PROCEDURE — 99213 OFFICE O/P EST LOW 20 MIN: CPT | Performed by: NURSE PRACTITIONER

## 2018-10-08 NOTE — PROGRESS NOTES
"Chief Complaint   Patient presents with   • Follow-up   • Sleeping Problem         Subjective   Azalea Mendoza is a 47 y.o. female.     History of Present Illness   Patient comes back today for follow up of Sleep apnea. Patient doesn't report any issues with the machine or mask.     Patient says that the compliance with the use of the equipment is good.  She has had her current machine since 2010.  Patient says that her symptoms of daytime sleepiness has been helped greatly with the use of PAP, as prescribed.     She has been in the emergency room secondary to atrial fibrillation and she states her machine was checked then because they thought maybe her machine was malfunctioning and this is why she had atrial fibrillation.  However her machine was in good working order so that was not the cause.    The following portions of the patient's history were reviewed and updated as appropriate: allergies, current medications, past family history, past medical history, past social history and past surgical history.    Review of Systems   HENT: Positive for rhinorrhea and sneezing. Negative for sinus pressure and sore throat.    Respiratory: Negative for cough, shortness of breath and wheezing.        Objective   Visit Vitals  /86   Pulse 74   Resp 16   Ht 170.2 cm (67.01\")   Wt 129 kg (284 lb)   SpO2 98%   BMI 44.47 kg/m²     Physical Exam   Constitutional: She is oriented to person, place, and time. She appears well-developed and well-nourished.   HENT:   Head: Atraumatic.   Crowded oropharynx. Large tonsils present.   Neck:   Increased adipose tissue.    Musculoskeletal:   Gait was normal.   Neurological: She is alert and oriented to person, place, and time.   Psychiatric: She has a normal mood and affect.   Vitals reviewed.          Assessment/Plan   Azalea was seen today for follow-up and sleeping problem.    Diagnoses and all orders for this visit:    Obstructive sleep apnea    Morbid obesity, unspecified " obesity type (CMS/HCC)           Return in about 10 months (around 8/8/2019) for Recheck, For Dr. Schneider.    DISCUSSION (if any):  Unfortunately there is no compliance available since the patient's machine assault she will have to take it to the DME company for a compliance to be downloaded.  Per her report she seems to have very good compliance with the machine and have encouraged her to continue using it on a nightly basis.    She states she is also down 100 pounds since January and has been doing Weight Watchers and I have encouraged her to continue this program.      Dictated utilizing Dragon dictation.    This document was electronically signed by JARRET Robert October 8, 2018  12:03 PM

## 2018-11-20 RX ORDER — TOLTERODINE 4 MG/1
CAPSULE, EXTENDED RELEASE ORAL
Qty: 90 CAPSULE | Refills: 1 | Status: SHIPPED | OUTPATIENT
Start: 2018-11-20

## 2018-12-13 ENCOUNTER — TELEPHONE (OUTPATIENT)
Dept: SURGERY | Facility: CLINIC | Age: 48
End: 2018-12-13

## 2018-12-13 NOTE — TELEPHONE ENCOUNTER
Called patient to schedule 1 year recall thyroid. Patient stated her Endocrinologist Dr Skylar Farmer is following up her thyroid.

## 2018-12-18 ENCOUNTER — TELEPHONE (OUTPATIENT)
Dept: INTERNAL MEDICINE | Facility: CLINIC | Age: 48
End: 2018-12-18

## 2018-12-18 NOTE — TELEPHONE ENCOUNTER
Patient left a voice mail requesting information on Hep A vaccine.     Her employer was going to pay for it at [facility] but because she has an allergy to neomycin they will not give it to her without a script.     Please advise on script

## 2018-12-19 NOTE — TELEPHONE ENCOUNTER
check with the pharmacist if the hep a vaccine contains anything with neomycin or substitute which may cross react and cause an allergy, if she gets the vaccine

## 2018-12-20 NOTE — TELEPHONE ENCOUNTER
Left voice mail letting patient know that we are checking with the pharmacy about Hep A vaccine and allergy to neomycin

## 2018-12-21 NOTE — TELEPHONE ENCOUNTER
I spoke with the pharmacist she says the patient reported a reaction to neomycin - even topical neomycin. The pharmacist is not comfortable injecting her with out permission from the physician.     Hep A does contain neomycin     Please advise

## 2019-01-03 RX ORDER — EPINEPHRINE 0.3 MG/.3ML
INJECTION INTRAMUSCULAR
Qty: 2 EACH | Refills: 1 | Status: SHIPPED | OUTPATIENT
Start: 2019-01-03

## 2019-01-08 ENCOUNTER — OFFICE (OUTPATIENT)
Dept: URBAN - METROPOLITAN AREA CLINIC 4 | Facility: CLINIC | Age: 49
End: 2019-01-08

## 2019-01-08 VITALS — SYSTOLIC BLOOD PRESSURE: 134 MMHG | HEIGHT: 67 IN | DIASTOLIC BLOOD PRESSURE: 82 MMHG | WEIGHT: 293 LBS

## 2019-01-08 DIAGNOSIS — R10.11 RIGHT UPPER QUADRANT PAIN: ICD-10-CM

## 2019-01-08 PROCEDURE — 99214 OFFICE O/P EST MOD 30 MIN: CPT | Performed by: NURSE PRACTITIONER

## 2019-01-23 ENCOUNTER — OFFICE VISIT (OUTPATIENT)
Dept: ORTHOPEDIC SURGERY | Facility: CLINIC | Age: 49
End: 2019-01-23

## 2019-01-23 VITALS — HEART RATE: 100 BPM | HEIGHT: 67 IN | WEIGHT: 293 LBS | BODY MASS INDEX: 45.99 KG/M2 | OXYGEN SATURATION: 98 %

## 2019-01-23 DIAGNOSIS — M25.571 CHRONIC PAIN OF RIGHT ANKLE: ICD-10-CM

## 2019-01-23 DIAGNOSIS — Z79.4 CONTROLLED TYPE 2 DIABETES MELLITUS WITH DIABETIC POLYNEUROPATHY, WITH LONG-TERM CURRENT USE OF INSULIN (HCC): ICD-10-CM

## 2019-01-23 DIAGNOSIS — E11.42 CONTROLLED TYPE 2 DIABETES MELLITUS WITH DIABETIC POLYNEUROPATHY, WITH LONG-TERM CURRENT USE OF INSULIN (HCC): ICD-10-CM

## 2019-01-23 DIAGNOSIS — I87.8 VENOUS STASIS: ICD-10-CM

## 2019-01-23 DIAGNOSIS — M79.671 RIGHT FOOT PAIN: Primary | ICD-10-CM

## 2019-01-23 DIAGNOSIS — G89.29 CHRONIC PAIN OF RIGHT ANKLE: ICD-10-CM

## 2019-01-23 PROCEDURE — 99213 OFFICE O/P EST LOW 20 MIN: CPT | Performed by: ORTHOPAEDIC SURGERY

## 2019-01-23 NOTE — PROGRESS NOTES
ESTABLISHED PATIENT    Patient: Azalea Mendoza  : 1970    Primary Care Provider: Shelby Hatfield MD    Requesting Provider: As above    Pain of the Right Ankle      History    Chief Complaint: Right ankle pain    History of Present Illness: She returns with a new problem.  She has been seeing Dr. Carrero for her knee arthritis.  She has developed right medial ankle pain.  Dr. Carrero and sent her for evaluation.  She reports that her glucose control is much improved, her A1c was done in 2018, it was 5.8.  She does have dense neuropathy.  She has not had any injury to the right ankle, no change in her shoes.  She reports the pain is over the medial malleolus, she rates it as 6 out of 10, aching and sharp.    Current Outpatient Medications on File Prior to Visit   Medication Sig Dispense Refill   • apixaban (ELIQUIS) 5 MG tablet tablet Take 1 tablet by mouth Every 12 (Twelve) Hours. 60 tablet 0   • cetirizine (zyrTEC) 10 MG tablet Take 1 tablet (10 mg total) by mouth Every Night 90 tablet 3   • diltiaZEM (TIAZAC) 420 MG 24 hr capsule Take 1 capsule by mouth once daily (Patient taking differently: Take 360 mg by mouth.) 30 capsule 0   • EPIPEN 2-DAVID 0.3 MG/0.3ML solution auto-injector injection USE AS INSTRUCTED 2 each 1   • glucose blood test strip 1 each by Other route 3 (Three) Times a Day Use as instructed 300 each 3   • hydrOXYzine (ATARAX) 25 MG tablet Take 1 tablet by mouth Every 6 (Six) Hours As Needed for Itching. 60 tablet 2   • losartan (COZAAR) 100 MG tablet Take 1 tablet (100 mg total) by mouth Daily 90 tablet 3   • metFORMIN ER (GLUCOPHAGE-XR) 500 MG 24 hr tablet Take 1 tablet by mouth 2 (Two) Times a Day Before Meals. 360 tablet 3   • mupirocin (BACTROBAN) 2 % cream Apply topically 3 (Three) Times a Day 15 g 6   • omeprazole (priLOSEC) 20 MG capsule Take 1 capsule (20 mg total) by mouth Daily 90 capsule 3   • tolterodine LA (DETROL LA) 4 MG 24 hr capsule TAKE 1 CAPSULE BY MOUTH EVERY DAY 90  capsule 1   • triamterene-hydrochlorothiazide (MAXZIDE-25) 37.5-25 MG per tablet Take 1 tablet by mouth Daily. 30 tablet 0   • Vitamin D, Cholecalciferol, 1000 units tablet Take 1,000 Units by mouth Daily.     • [DISCONTINUED] doxycycline (VIBRAMYICN) 100 MG tablet Take 100 mg by mouth 2 (Two) Times a Day.  0   • [DISCONTINUED] PredniSONE (DELTASONE) 10 MG (21) tablet pack Daily taper- 6/5/4/3/2/1 21 tablet 0     No current facility-administered medications on file prior to visit.       Allergies   Allergen Reactions   • Fish Allergy Anaphylaxis, Itching, Nausea And Vomiting and Nausea Only   • Neomycin-Bacitracin Zn-Polymyx Hives   • Nuts Unknown (See Comments)     Patient was allergy tested, list in chart under media.   • Ciprofloxacin-Dexamethasone Hives   • Penicillins Palpitations      Past Medical History:   Diagnosis Date   • Acute bronchitis with bronchospasm    • Acute maxillary sinusitis    • Acute suppurative otitis media of both ears without spontaneous rupture of tympanic membranes    • Allergic rhinitis due to pollen    • Arthritis    • Asthma    • Back pain, lumbosacral    • Cervicalgia    • Cyst of breast    • Diabetes mellitus (CMS/HCC)    • Esophagitis    • GERD (gastroesophageal reflux disease)    • H/O cystoscopy     diagnostic cystoscopy   • Hyperlipidemia    • Hypertension    • IBS (irritable bowel syndrome)    • Kidney infection    • Lump or mass in breast    • Muscle spasm    • Ovarian cancer (CMS/HCC)    • Right knee pain    • Shingles    • Sleep apnea    • Thyroid nodule    • UTI (urinary tract infection)      Past Surgical History:   Procedure Laterality Date   • COLONOSCOPY     • CYST REMOVAL     • CYSTOSCOPY     • TOTAL ABDOMINAL HYSTERECTOMY WITH SALPINGO OOPHORECTOMY     • TUMOR REMOVAL       Family History   Problem Relation Age of Onset   • Hypertension Mother    • Diabetes Mother         type 2   • Arthritis Mother    • Arthritis Father    • Hypertension Father    • Hyperlipidemia  "Father    • Diabetes Father         type 2   • Stroke Father    • Cancer Other    • Osteoporosis Maternal Grandmother       Social History     Socioeconomic History   • Marital status:      Spouse name: Not on file   • Number of children: Not on file   • Years of education: Not on file   • Highest education level: Not on file   Social Needs   • Financial resource strain: Not on file   • Food insecurity - worry: Not on file   • Food insecurity - inability: Not on file   • Transportation needs - medical: Not on file   • Transportation needs - non-medical: Not on file   Occupational History   • Not on file   Tobacco Use   • Smoking status: Never Smoker   • Smokeless tobacco: Never Used   Substance and Sexual Activity   • Alcohol use: No   • Drug use: No   • Sexual activity: Defer   Other Topics Concern   • Not on file   Social History Narrative   • Not on file        Review of Systems   Musculoskeletal: Positive for arthralgias and gait problem.   Neurological: Positive for numbness (neuropathy bilat feet).       The following portions of the patient's history were reviewed and updated as appropriate: allergies, current medications, past family history, past medical history, past social history, past surgical history and problem list.    Physical Exam:   Pulse 100   Ht 170.2 cm (67\")   Wt 133 kg (293 lb 3.4 oz)   SpO2 98%   BMI 45.92 kg/m²   GENERAL: Body habitus: morbidly obese    Lower extremity edema: Left: 1+ pitting; Right: 1+ pitting    Gait: normal     Mental Status:  awake and alert; oriented to person, place, and time  MSK:  Tibia:  Right:  non tender; Left:  non tender        Ankle:  Right: She is exquisitely tender over the medial malleolus at the shoulder of the ankle joint, nontender over the posterior tibial tendon, nontender across the anterior ankle, no other tenderness, no focal swelling; Left:  non tender she is able to do a double and single leg toe raise, the right hindfoot inverts the " same as the left        Foot:  Right:  non tender; Left:  non tender    NEURO Sensation:  globally diminished,       Medical Decision Making    Data Review:   ordered and reviewed x-rays today and reviewed outside records    Assessment/Plan/Diagnosis/Treatment Options:   1. Chronic pain of right ankle  I do not think she has posterior tibial tendinitis, she has pain over the medial malleolus.  Sometimes I'm seeing this be bursitis, sometimes stress fracture.  Her tennis shoes do, fairly high on her ankles, and she has edema, and it's possibly that that combination cause local rubbing even though she has not changed her shoes.  We need to do an MRI for evaluation since the pain has been so persistent.  - XR Ankle 2 View Right  - XR Foot 2 View Right    2. Controlled type 2 diabetes mellitus with diabetic polyneuropathy, with long-term current use of insulin (CMS/[facility])  Better glucose control, no change in severe neuropathy    3. Venous stasis  She really needs to wear support stockings daily we discussed what type and where to find them

## 2019-01-23 NOTE — PROGRESS NOTES
NEW PATIENT    Patient: Azalea Mendoza  : 1970    Primary Care Provider: Shelby Hatfield MD    Requesting Provider: As above    Pain of the Right Ankle      History    Chief Complaint: ***    History of Present Illness: ***    Current Outpatient Medications on File Prior to Visit   Medication Sig Dispense Refill   • apixaban (ELIQUIS) 5 MG tablet tablet Take 1 tablet by mouth Every 12 (Twelve) Hours. 60 tablet 0   • cetirizine (zyrTEC) 10 MG tablet Take 1 tablet (10 mg total) by mouth Every Night 90 tablet 3   • diltiaZEM (TIAZAC) 420 MG 24 hr capsule Take 1 capsule by mouth once daily (Patient taking differently: Take 360 mg by mouth.) 30 capsule 0   • EPIPEN 2-DAVID 0.3 MG/0.3ML solution auto-injector injection USE AS INSTRUCTED 2 each 1   • glucose blood test strip 1 each by Other route 3 (Three) Times a Day Use as instructed 300 each 3   • hydrOXYzine (ATARAX) 25 MG tablet Take 1 tablet by mouth Every 6 (Six) Hours As Needed for Itching. 60 tablet 2   • losartan (COZAAR) 100 MG tablet Take 1 tablet (100 mg total) by mouth Daily 90 tablet 3   • metFORMIN ER (GLUCOPHAGE-XR) 500 MG 24 hr tablet Take 1 tablet by mouth 2 (Two) Times a Day Before Meals. 360 tablet 3   • mupirocin (BACTROBAN) 2 % cream Apply topically 3 (Three) Times a Day 15 g 6   • omeprazole (priLOSEC) 20 MG capsule Take 1 capsule (20 mg total) by mouth Daily 90 capsule 3   • tolterodine LA (DETROL LA) 4 MG 24 hr capsule TAKE 1 CAPSULE BY MOUTH EVERY DAY 90 capsule 1   • triamterene-hydrochlorothiazide (MAXZIDE-25) 37.5-25 MG per tablet Take 1 tablet by mouth Daily. 30 tablet 0   • Vitamin D, Cholecalciferol, 1000 units tablet Take 1,000 Units by mouth Daily.     • [DISCONTINUED] doxycycline (VIBRAMYICN) 100 MG tablet Take 100 mg by mouth 2 (Two) Times a Day.  0   • [DISCONTINUED] PredniSONE (DELTASONE) 10 MG (21) tablet pack Daily taper- 6/5/4/3/2/1 21 tablet 0     No current facility-administered medications on file prior to visit.        Allergies   Allergen Reactions   • Fish Allergy Anaphylaxis, Itching, Nausea And Vomiting and Nausea Only   • Neomycin-Bacitracin Zn-Polymyx Hives   • Nuts Unknown (See Comments)     Patient was allergy tested, list in chart under media.   • Ciprofloxacin-Dexamethasone Hives   • Penicillins Palpitations      Past Medical History:   Diagnosis Date   • Acute bronchitis with bronchospasm    • Acute maxillary sinusitis    • Acute suppurative otitis media of both ears without spontaneous rupture of tympanic membranes    • Allergic rhinitis due to pollen    • Arthritis    • Asthma    • Back pain, lumbosacral    • Cervicalgia    • Cyst of breast    • Diabetes mellitus (CMS/HCC)    • Esophagitis    • GERD (gastroesophageal reflux disease)    • H/O cystoscopy     diagnostic cystoscopy   • Hyperlipidemia    • Hypertension    • IBS (irritable bowel syndrome)    • Kidney infection    • Lump or mass in breast    • Muscle spasm    • Ovarian cancer (CMS/HCC)    • Right knee pain    • Shingles    • Sleep apnea    • Thyroid nodule    • UTI (urinary tract infection)      Past Surgical History:   Procedure Laterality Date   • COLONOSCOPY     • CYST REMOVAL     • CYSTOSCOPY     • TOTAL ABDOMINAL HYSTERECTOMY WITH SALPINGO OOPHORECTOMY     • TUMOR REMOVAL       Family History   Problem Relation Age of Onset   • Hypertension Mother    • Diabetes Mother         type 2   • Arthritis Mother    • Arthritis Father    • Hypertension Father    • Hyperlipidemia Father    • Diabetes Father         type 2   • Stroke Father    • Cancer Other    • Osteoporosis Maternal Grandmother       Social History     Socioeconomic History   • Marital status:      Spouse name: Not on file   • Number of children: Not on file   • Years of education: Not on file   • Highest education level: Not on file   Social Needs   • Financial resource strain: Not on file   • Food insecurity - worry: Not on file   • Food insecurity - inability: Not on file   •  "Transportation needs - medical: Not on file   • Transportation needs - non-medical: Not on file   Occupational History   • Not on file   Tobacco Use   • Smoking status: Never Smoker   • Smokeless tobacco: Never Used   Substance and Sexual Activity   • Alcohol use: No   • Drug use: No   • Sexual activity: Defer   Other Topics Concern   • Not on file   Social History Narrative   • Not on file        Review of Systems   Constitutional: Negative.    HENT: Negative.    Eyes: Negative.    Respiratory: Negative.    Cardiovascular: Negative.    Gastrointestinal: Negative.    Endocrine: Negative.    Genitourinary: Negative.    Musculoskeletal: Positive for arthralgias and joint swelling.   Skin: Negative.    Allergic/Immunologic: Negative.    Neurological: Positive for numbness.   Hematological: Negative.    Psychiatric/Behavioral: Negative.        The following portions of the patient's history were reviewed and updated as appropriate: {history reviewed:20406::\"allergies\",\"current medications\",\"past family history\",\"past medical history\",\"past social history\",\"past surgical history\",\"problem list\"}.    Physical Exam:   Pulse 100   Ht 170.2 cm (67\")   Wt 133 kg (293 lb 3.4 oz)   SpO2 98%   BMI 45.92 kg/m²   GENERAL: Body habitus: {body habitus:62111}    Lower extremity edema: Right: {ltdedema:20336::\"none\"}; Leftt: {ltdedema:78641::\"none\"}    Varicose veins:  Right: {ltdveins:89482::\"none\"}; Left: {ltdveins:21588::\"none\"}    Gait: {ltdgait:52158::\"normal\"}     Mental Status:  {mental status:44220}    Voice:  {Voice quality:58709}  SKIN:  {integument:32377::\"Normal\"}    Hair Growth:  Right:{hair growth:20478::\"normal\"}; Left:  {hair growth:41561::\"normal\"}  NAILS: Toenails: {toenails:54748::\"normal\"}  HEENT: {Exam; heent:87460}  PULM:  {pulm:46643::\"Repiratory effort normal\"}  CV:  Dorsalis Pedis:  Right: {degnore pulses:85388}; Left:{degnore pulses:88474}    Posterior Tibial: Right:{degnore pulses:56032}; Left:{degnore " pulses:95862}    Capillary Refill:  {Blank single:39089}  MSK:  Hand:{hand exam:37544}      Tibia:  Right:  {tibia exam:51465}; Left:  {tibia exam:55375}      Ankle:  Right: {foot/ankle exam:39475}; Left:  {foot/ankle exam:49946}      Foot:  Right:  {foot/ankle exam:09904}; Left:  {foot/ankle exam:63049}      NEURO: Heel Walking:  Right:  {normal and wild card:69581}; Left:  {normal and     wild card:60395}    Toe Walking:  Right:  {toe walkin}; Left:  {toe walkin}     [place] 5.07 monofilament test: {semmes-trinity:85687}    Lower extremity sensation: {Sensation:83699}     Reflexes:  Biceps:  Right:  {reflexes:82924}; Left:  {reflexes:10474}           Quads:  Right:  {reflexes:68878}; Left:  {reflexes:13303}           Ankle:  Right:  {reflexes:74638}; Left:  {reflexes:80147}      Calf Atrophy:{normal and wild card:69812}    Motor Function: {motor:59214}         Medical Decision Making    Data Review:   {Data Review:99431}    Assessment and Plan/ Diagnosis/Treatment options:   There are no diagnoses linked to this encounter.    ***

## 2019-01-25 ENCOUNTER — HOSPITAL ENCOUNTER (OUTPATIENT)
Dept: MRI IMAGING | Facility: HOSPITAL | Age: 49
Discharge: HOME | End: 2019-01-25
Attending: ORTHOPAEDIC SURGERY | Admitting: ORTHOPAEDIC SURGERY

## 2019-01-25 DIAGNOSIS — M25.571 CHRONIC PAIN OF RIGHT ANKLE: ICD-10-CM

## 2019-01-25 DIAGNOSIS — G89.29 CHRONIC PAIN OF RIGHT ANKLE: ICD-10-CM

## 2019-01-25 PROCEDURE — 73721 MRI JNT OF LWR EXTRE W/O DYE: CPT

## 2019-02-06 ENCOUNTER — OFFICE VISIT (OUTPATIENT)
Dept: ORTHOPEDIC SURGERY | Facility: CLINIC | Age: 49
End: 2019-02-06

## 2019-02-06 VITALS — HEART RATE: 106 BPM | HEIGHT: 67 IN | BODY MASS INDEX: 45.99 KG/M2 | WEIGHT: 293 LBS | OXYGEN SATURATION: 98 %

## 2019-02-06 DIAGNOSIS — I87.8 VENOUS STASIS: ICD-10-CM

## 2019-02-06 DIAGNOSIS — G89.29 CHRONIC PAIN OF RIGHT ANKLE: Primary | ICD-10-CM

## 2019-02-06 DIAGNOSIS — M25.571 CHRONIC PAIN OF RIGHT ANKLE: Primary | ICD-10-CM

## 2019-02-06 DIAGNOSIS — E11.42 CONTROLLED TYPE 2 DIABETES MELLITUS WITH DIABETIC POLYNEUROPATHY, WITH LONG-TERM CURRENT USE OF INSULIN (HCC): ICD-10-CM

## 2019-02-06 DIAGNOSIS — Z79.4 CONTROLLED TYPE 2 DIABETES MELLITUS WITH DIABETIC POLYNEUROPATHY, WITH LONG-TERM CURRENT USE OF INSULIN (HCC): ICD-10-CM

## 2019-02-06 PROCEDURE — 99213 OFFICE O/P EST LOW 20 MIN: CPT | Performed by: ORTHOPAEDIC SURGERY

## 2019-02-06 NOTE — PROGRESS NOTES
ESTABLISHED PATIENT    Patient: Azalea Mendoza  : 1970    Primary Care Provider: Shelby Hatfield MD    Requesting Provider: As above    Follow-up (MRI Right Ankle 19)      History    Chief Complaint: Right ankle pain    History of Present Illness: She returns for follow-up of the MRI dated 19.  I looked at it and the report.  It does indeed show bursitis over the medial malleolus.  The tendons are intact.  No other abnormalities that would be causing pain at the level of the ankle.  I examined her shoes, she is wearing good shoes, but they do come up to that level of the bursa, and I think they are part of what is causing the problem.  I suspect that the venous stasis causes swelling around the ankle which leads to increased pressure of the shoe on the bursa, and that's what caused this.    Current Outpatient Medications on File Prior to Visit   Medication Sig Dispense Refill   • apixaban (ELIQUIS) 5 MG tablet tablet Take 1 tablet by mouth Every 12 (Twelve) Hours. 60 tablet 0   • cetirizine (zyrTEC) 10 MG tablet Take 1 tablet (10 mg total) by mouth Every Night 90 tablet 3   • diltiaZEM (TIAZAC) 420 MG 24 hr capsule Take 1 capsule by mouth once daily (Patient taking differently: Take 360 mg by mouth.) 30 capsule 0   • EPIPEN 2-DAVID 0.3 MG/0.3ML solution auto-injector injection USE AS INSTRUCTED 2 each 1   • glucose blood test strip 1 each by Other route 3 (Three) Times a Day Use as instructed 300 each 3   • hydrOXYzine (ATARAX) 25 MG tablet Take 1 tablet by mouth Every 6 (Six) Hours As Needed for Itching. 60 tablet 2   • losartan (COZAAR) 100 MG tablet Take 1 tablet (100 mg total) by mouth Daily 90 tablet 3   • metFORMIN ER (GLUCOPHAGE-XR) 500 MG 24 hr tablet Take 1 tablet by mouth 2 (Two) Times a Day Before Meals. 360 tablet 3   • mupirocin (BACTROBAN) 2 % cream Apply topically 3 (Three) Times a Day 15 g 6   • omeprazole (priLOSEC) 20 MG capsule Take 1 capsule (20 mg total) by mouth Daily 90  capsule 3   • tolterodine LA (DETROL LA) 4 MG 24 hr capsule TAKE 1 CAPSULE BY MOUTH EVERY DAY 90 capsule 1   • triamterene-hydrochlorothiazide (MAXZIDE-25) 37.5-25 MG per tablet Take 1 tablet by mouth Daily. 30 tablet 0   • Vitamin D, Cholecalciferol, 1000 units tablet Take 1,000 Units by mouth Daily.       No current facility-administered medications on file prior to visit.       Allergies   Allergen Reactions   • Fish Allergy Anaphylaxis, Itching, Nausea And Vomiting and Nausea Only   • Neomycin-Bacitracin Zn-Polymyx Hives   • Nuts Unknown (See Comments)     Patient was allergy tested, list in chart under media.   • Ciprofloxacin-Dexamethasone Hives   • Penicillins Palpitations      Past Medical History:   Diagnosis Date   • Acute bronchitis with bronchospasm    • Acute maxillary sinusitis    • Acute suppurative otitis media of both ears without spontaneous rupture of tympanic membranes    • Allergic rhinitis due to pollen    • Arthritis    • Asthma    • Back pain, lumbosacral    • Cervicalgia    • Cyst of breast    • Diabetes mellitus (CMS/HCC)    • Esophagitis    • GERD (gastroesophageal reflux disease)    • H/O cystoscopy     diagnostic cystoscopy   • Hyperlipidemia    • Hypertension    • IBS (irritable bowel syndrome)    • Kidney infection    • Lump or mass in breast    • Muscle spasm    • Ovarian cancer (CMS/HCC)    • Right knee pain    • Shingles    • Sleep apnea    • Thyroid nodule    • UTI (urinary tract infection)      Past Surgical History:   Procedure Laterality Date   • COLONOSCOPY     • CYST REMOVAL     • CYSTOSCOPY     • TOTAL ABDOMINAL HYSTERECTOMY WITH SALPINGO OOPHORECTOMY     • TUMOR REMOVAL       Family History   Problem Relation Age of Onset   • Hypertension Mother    • Diabetes Mother         type 2   • Arthritis Mother    • Arthritis Father    • Hypertension Father    • Hyperlipidemia Father    • Diabetes Father         type 2   • Stroke Father    • Cancer Other    • Osteoporosis Maternal  "Grandmother       Social History     Socioeconomic History   • Marital status:      Spouse name: Not on file   • Number of children: Not on file   • Years of education: Not on file   • Highest education level: Not on file   Social Needs   • Financial resource strain: Not on file   • Food insecurity - worry: Not on file   • Food insecurity - inability: Not on file   • Transportation needs - medical: Not on file   • Transportation needs - non-medical: Not on file   Occupational History   • Not on file   Tobacco Use   • Smoking status: Never Smoker   • Smokeless tobacco: Never Used   Substance and Sexual Activity   • Alcohol use: No   • Drug use: No   • Sexual activity: Defer   Other Topics Concern   • Not on file   Social History Narrative   • Not on file        Review of Systems   Constitutional: Negative.    HENT: Negative.    Eyes: Negative.    Respiratory: Negative.    Cardiovascular: Negative.    Gastrointestinal: Negative.    Endocrine: Negative.    Genitourinary: Negative.    Musculoskeletal: Positive for arthralgias (ankle pain).   Skin: Negative.    Allergic/Immunologic: Negative.    Neurological: Negative.    Hematological: Negative.    Psychiatric/Behavioral: Negative.        The following portions of the patient's history were reviewed and updated as appropriate: allergies, current medications, past family history, past medical history, past social history, past surgical history and problem list.    Physical Exam:   Pulse 106   Ht 170.2 cm (67.01\")   Wt 133 kg (293 lb 3.4 oz)   SpO2 98%   BMI 45.91 kg/m²   GENERAL: Gait: normal       MSK:  Ankle:  Right: Tender over the bursa over the medial malleolus; Left:  non tender        Foot:  Right:  non tender; Left:  non tender    NEURO Sensation:  globally diminished,       Medical Decision Making    Data Review:   reviewed radiology images and reviewed radiology results    Assessment/Plan/Diagnosis/Treatment Options:   1. Chronic pain of right " ankle  This is bursitis as I suspected.  We talked about ways to change the laces on her shoes.  We talked about different types of shoes.  She is wearing the shoes because they help her plantar fasciitis, we talked about stretching.  We discussed cortisone injection but she would like to avoid that.  I therefore suggested she try some topical Voltaren gel.  I will be happy to see her any time    2. Controlled type 2 diabetes mellitus with diabetic polyneuropathy, with long-term current use of insulin (CMS/[facility])  Much better glucose control    3. Venous stasis  She really needs to wear support stockings daily

## 2019-02-14 ENCOUNTER — APPOINTMENT (OUTPATIENT)
Dept: PREADMISSION TESTING | Facility: HOSPITAL | Age: 49
End: 2019-02-14

## 2019-02-25 RX ORDER — OMEPRAZOLE 20 MG/1
CAPSULE, DELAYED RELEASE ORAL
Qty: 90 CAPSULE | Refills: 1 | Status: SHIPPED | OUTPATIENT
Start: 2019-02-25

## 2019-03-13 ENCOUNTER — APPOINTMENT (OUTPATIENT)
Dept: PREADMISSION TESTING | Facility: HOSPITAL | Age: 49
End: 2019-03-13

## 2019-03-14 ENCOUNTER — APPOINTMENT (OUTPATIENT)
Dept: PREADMISSION TESTING | Facility: HOSPITAL | Age: 49
End: 2019-03-14

## 2019-03-14 VITALS — WEIGHT: 293 LBS | BODY MASS INDEX: 45.99 KG/M2 | HEIGHT: 67 IN

## 2019-03-14 LAB
ALBUMIN SERPL-MCNC: 4.75 G/DL (ref 3.2–4.8)
ALBUMIN/GLOB SERPL: 2.2 G/DL (ref 1.5–2.5)
ALP SERPL-CCNC: 119 U/L (ref 25–100)
ALT SERPL W P-5'-P-CCNC: 10 U/L (ref 7–40)
ANION GAP SERPL CALCULATED.3IONS-SCNC: 8 MMOL/L (ref 3–11)
AST SERPL-CCNC: 15 U/L (ref 0–33)
BILIRUB SERPL-MCNC: 0.4 MG/DL (ref 0.3–1.2)
BUN BLD-MCNC: 19 MG/DL (ref 9–23)
BUN/CREAT SERPL: 26 (ref 7–25)
CALCIUM SPEC-SCNC: 11.2 MG/DL (ref 8.7–10.4)
CHLORIDE SERPL-SCNC: 105 MMOL/L (ref 99–109)
CO2 SERPL-SCNC: 28 MMOL/L (ref 20–31)
CREAT BLD-MCNC: 0.73 MG/DL (ref 0.6–1.3)
DEPRECATED RDW RBC AUTO: 43.6 FL (ref 37–54)
ERYTHROCYTE [DISTWIDTH] IN BLOOD BY AUTOMATED COUNT: 13.6 % (ref 11.3–14.5)
GFR SERPL CREATININE-BSD FRML MDRD: 85 ML/MIN/1.73
GLOBULIN UR ELPH-MCNC: 2.2 GM/DL
GLUCOSE BLD-MCNC: 136 MG/DL (ref 70–100)
HCT VFR BLD AUTO: 44.3 % (ref 34.5–44)
HGB BLD-MCNC: 14.2 G/DL (ref 11.5–15.5)
MCH RBC QN AUTO: 28.3 PG (ref 27–31)
MCHC RBC AUTO-ENTMCNC: 32.1 G/DL (ref 32–36)
MCV RBC AUTO: 88.2 FL (ref 80–99)
PLATELET # BLD AUTO: 328 10*3/MM3 (ref 150–450)
PMV BLD AUTO: 9.8 FL (ref 6–12)
POTASSIUM BLD-SCNC: 4.7 MMOL/L (ref 3.5–5.5)
PROT SERPL-MCNC: 6.9 G/DL (ref 5.7–8.2)
RBC # BLD AUTO: 5.02 10*6/MM3 (ref 3.89–5.14)
SODIUM BLD-SCNC: 141 MMOL/L (ref 132–146)
WBC NRBC COR # BLD: 11.34 10*3/MM3 (ref 3.5–10.8)

## 2019-03-14 PROCEDURE — 93005 ELECTROCARDIOGRAM TRACING: CPT

## 2019-03-14 PROCEDURE — 85027 COMPLETE CBC AUTOMATED: CPT | Performed by: SURGERY

## 2019-03-14 PROCEDURE — 80053 COMPREHEN METABOLIC PANEL: CPT | Performed by: SURGERY

## 2019-03-14 PROCEDURE — 93010 ELECTROCARDIOGRAM REPORT: CPT | Performed by: INTERNAL MEDICINE

## 2019-03-14 PROCEDURE — 36415 COLL VENOUS BLD VENIPUNCTURE: CPT

## 2019-03-14 RX ORDER — DILTIAZEM HYDROCHLORIDE 360 MG/1
360 CAPSULE, EXTENDED RELEASE ORAL DAILY
COMMUNITY

## 2019-03-14 NOTE — PAT
Patient has history of new Afib and on eliquis. Requested cardiac clearance from Dr. Chau.     Patient to apply Chlorhexadine wipes  to surgical area (as instructed) the night before procedure and the AM of procedure. Wipes provided.    Bactroban given to patient along with verbal and written instructions to put in nose night before surgery. Patient verbalized understanding.

## 2019-04-17 ENCOUNTER — OFFICE VISIT (OUTPATIENT)
Dept: SURGERY | Facility: CLINIC | Age: 49
End: 2019-04-17

## 2019-04-17 VITALS
TEMPERATURE: 98.3 F | SYSTOLIC BLOOD PRESSURE: 142 MMHG | BODY MASS INDEX: 45.99 KG/M2 | WEIGHT: 293 LBS | HEIGHT: 67 IN | HEART RATE: 93 BPM | OXYGEN SATURATION: 97 % | DIASTOLIC BLOOD PRESSURE: 94 MMHG

## 2019-04-17 DIAGNOSIS — E04.1 THYROID NODULE: Primary | ICD-10-CM

## 2019-04-17 DIAGNOSIS — E83.52 HYPERCALCEMIA: ICD-10-CM

## 2019-04-17 PROCEDURE — 99214 OFFICE O/P EST MOD 30 MIN: CPT | Performed by: SURGERY

## 2019-04-17 NOTE — PROGRESS NOTES
Patient: Azalea Mendoza    YOB: 1970    Date: 04/17/2019    Primary Care Provider: Cedrick James DO    Chief Complaint   Patient presents with   • Thyroid Problem   • Difficulty Swallowing       SUBJECTIVE:    History of present illness:  Patient is in the office today for evaluation and consultation for Bilateral thyroid nodules, largest one on the left side measuring 5.1 cm. Patient states no imaging since 12/2017. She states difficulty swallowing for 2 weeks.  Repeat ultrasound today indicates that left thyroid nodule now measures 5.2 cm.  Right thyroid nodule has an increase in cystic component.  No change in solid component.  Patient also has been diagnosed with hypercalcemia, the last level was 11.2.  No history of kidney stones or renal failure.  No other cause for hypercalcemia.  No previous parathyroid workup.    The following portions of the patient's history were reviewed and updated as appropriate: allergies, current medications, past family history, past medical history, past social history, past surgical history and problem list.       Review of Systems   Constitutional: Negative for chills, fever and unexpected weight change.   HENT: Positive for sore throat. Negative for hearing loss, trouble swallowing and voice change.    Eyes: Negative for visual disturbance.   Respiratory: Negative for apnea, cough, chest tightness, shortness of breath and wheezing.    Cardiovascular: Negative for chest pain, palpitations and leg swelling.   Gastrointestinal: Negative for abdominal distention, abdominal pain, anal bleeding, blood in stool, constipation, diarrhea, nausea, rectal pain and vomiting.   Endocrine: Negative for cold intolerance and heat intolerance.   Genitourinary: Negative for difficulty urinating, dysuria and flank pain.   Musculoskeletal: Negative for back pain and gait problem.   Skin: Negative for color change, rash and wound.   Neurological: Negative for dizziness,  syncope, speech difficulty, weakness, light-headedness, numbness and headaches.   Hematological: Negative for adenopathy. Does not bruise/bleed easily.   Psychiatric/Behavioral: Negative for confusion. The patient is not nervous/anxious.        Allergies:  Allergies   Allergen Reactions   • Fish Allergy Anaphylaxis, Itching, Nausea And Vomiting and Nausea Only   • Neomycin-Bacitracin Zn-Polymyx Hives   • Nuts Unknown (See Comments)     Patient was allergy tested, list in chart under media.   • Ciprofloxacin-Dexamethasone Hives   • Eggs Or Egg-Derived Products Rash   • Penicillins Palpitations       Medications:    Current Outpatient Medications:   •  apixaban (ELIQUIS) 5 MG tablet tablet, Take 1 tablet by mouth Every 12 (Twelve) Hours., Disp: 60 tablet, Rfl: 0  •  cetirizine (zyrTEC) 10 MG tablet, Take 1 tablet (10 mg total) by mouth Every Night, Disp: 90 tablet, Rfl: 3  •  diclofenac (VOLTAREN) 1 % gel gel, Apply 4 g topically to the appropriate area as directed 4 (Four) Times a Day As Needed (bursitis ankle)., Disp: 5 tube, Rfl: 5  •  diltiaZEM (TIAZAC) 360 MG 24 hr capsule, Take 360 mg by mouth Daily., Disp: , Rfl:   •  EPIPEN 2-DAVID 0.3 MG/0.3ML solution auto-injector injection, USE AS INSTRUCTED, Disp: 2 each, Rfl: 1  •  glucose blood test strip, 1 each by Other route 3 (Three) Times a Day Use as instructed, Disp: 300 each, Rfl: 3  •  hydrOXYzine (ATARAX) 25 MG tablet, Take 1 tablet by mouth Every 6 (Six) Hours As Needed for Itching., Disp: 60 tablet, Rfl: 2  •  losartan (COZAAR) 100 MG tablet, Take 1 tablet (100 mg total) by mouth Daily, Disp: 90 tablet, Rfl: 3  •  metFORMIN ER (GLUCOPHAGE-XR) 500 MG 24 hr tablet, Take 1 tablet by mouth 2 (Two) Times a Day Before Meals., Disp: 360 tablet, Rfl: 3  •  mupirocin (BACTROBAN) 2 % cream, Apply topically 3 (Three) Times a Day, Disp: 15 g, Rfl: 6  •  omeprazole (priLOSEC) 20 MG capsule, TAKE ONE CAPSULE BY MOUTH ONCE DAILY, Disp: 90 capsule, Rfl: 1  •  predniSONE  (DELTASONE) 10 MG (21) tablet pack, Daily taper- 6/5/4/3/2/1, Disp: 21 tablet, Rfl: 0  •  tolterodine LA (DETROL LA) 4 MG 24 hr capsule, TAKE 1 CAPSULE BY MOUTH EVERY DAY, Disp: 90 capsule, Rfl: 1  •  triamterene-hydrochlorothiazide (MAXZIDE-25) 37.5-25 MG per tablet, Take 1 tablet by mouth Daily., Disp: 30 tablet, Rfl: 0  •  Vitamin D, Cholecalciferol, 1000 units tablet, Take 1,000 Units by mouth Daily., Disp: , Rfl:     History:  Past Medical History:   Diagnosis Date   • A-fib (CMS/HCC)    • Acute bronchitis with bronchospasm    • Acute maxillary sinusitis    • Acute suppurative otitis media of both ears without spontaneous rupture of tympanic membranes    • Allergic rhinitis due to pollen    • Arthritis    • Asthma    • Back pain, lumbosacral    • Cervicalgia    • Cyst of breast    • Diabetes mellitus (CMS/HCC)    • Esophagitis    • GERD (gastroesophageal reflux disease)    • H/O cystoscopy     diagnostic cystoscopy   • Heart murmur    • Hyperlipidemia    • Hypertension    • IBS (irritable bowel syndrome)    • Kidney infection    • Lump or mass in breast    • Muscle spasm    • Ovarian cancer (CMS/HCC)    • Overactive bladder    • Right knee pain    • Shingles    • Sleep apnea    • Thyroid nodule    • UTI (urinary tract infection)        Past Surgical History:   Procedure Laterality Date   • CARDIAC CATHETERIZATION     • COLONOSCOPY     • CYST REMOVAL     • CYSTOSCOPY     • TOTAL ABDOMINAL HYSTERECTOMY WITH SALPINGO OOPHORECTOMY     • TUMOR REMOVAL         Family History   Problem Relation Age of Onset   • Hypertension Mother    • Diabetes Mother         type 2   • Arthritis Mother    • Arthritis Father    • Hypertension Father    • Hyperlipidemia Father    • Diabetes Father         type 2   • Stroke Father    • Cancer Other    • Osteoporosis Maternal Grandmother        Social History     Tobacco Use   • Smoking status: Never Smoker   • Smokeless tobacco: Never Used   Substance Use Topics   • Alcohol use: No   •  "Drug use: No        OBJECTIVE:    Vital Signs:   Vitals:    04/17/19 0940   BP: 142/94   Pulse: 93   Temp: 98.3 °F (36.8 °C)   SpO2: 97%   Weight: (!) 138 kg (305 lb)   Height: 170.2 cm (67\")       Physical Exam:   General Appearance:    Alert, cooperative, in no acute distress   Head:    Normocephalic, without obvious abnormality, atraumatic   Eyes:            Lids and lashes normal, conjunctivae and sclerae normal, no   icterus, no pallor, corneas clear, PERRLA   Ears:    Ears appear intact with no abnormalities noted   Throat:   No oral lesions, no thrush, oral mucosa moist   Neck:   No adenopathy, supple, trachea midline, palpable left thyroid nodule, 4-5 cm in size and mobile., no   carotid bruit, no JVD   Lungs:     Clear to auscultation,respirations regular, even and                  unlabored    Heart:    Regular rhythm and normal rate, normal S1 and S2, no            murmur, no gallop, no rub, no click   Chest Wall:    No abnormalities observed   Abdomen:     Normal bowel sounds, no masses, no organomegaly, soft        non-tender, non-distended, no guarding, no rebound                tenderness   Extremities:   Moves all extremities well, no edema, no cyanosis, no             redness   Pulses:   Pulses palpable and equal bilaterally   Skin:   No bleeding, bruising or rash   Lymph nodes:   No palpable adenopathy   Neurologic:   Cranial nerves 2 - 12 grossly intact, sensation intact, DTR       present and equal bilaterally     Results Review:   I reviewed the patient's new clinical results.    ASSESSMENT/PLAN:    1. Thyroid nodule    2. Hypercalcemia        Patient scheduled for parathyroid scan to rule out parathyroid adenoma.  Also concerned about persistent increased left thyroid nodule.  Slight increase in symptoms.  Follow-up in 1 week to discuss results of parathyroid scan.    I discussed the patients findings and my recommendations with patient    Review of Systems was reviewed and confirmed as " accurate today.    Electronically signed by Kang Polk MD  04/17/19      .

## 2019-04-22 ENCOUNTER — HOSPITAL ENCOUNTER (OUTPATIENT)
Dept: NUCLEAR MEDICINE | Facility: HOSPITAL | Age: 49
Discharge: HOME | End: 2019-04-22

## 2019-04-22 DIAGNOSIS — E04.1 THYROID NODULE: ICD-10-CM

## 2019-04-22 PROCEDURE — 78014 THYROID IMAGING W/BLOOD FLOW: CPT

## 2019-04-22 PROCEDURE — 0 SODIUM IODIDE 3.7 MBQ CAPSULE: Performed by: SURGERY

## 2019-04-22 PROCEDURE — A9516 IODINE I-123 SOD IODIDE MIC: HCPCS | Performed by: SURGERY

## 2019-04-22 RX ORDER — SODIUM IODIDE I 123 100 UCI/1
1 CAPSULE, GELATIN COATED ORAL
Status: COMPLETED | OUTPATIENT
Start: 2019-04-22 | End: 2019-04-22

## 2019-04-22 RX ADMIN — SODIUM IODIDE I 123 1 CAPSULE: 100 CAPSULE, GELATIN COATED ORAL at 07:50

## 2019-04-23 ENCOUNTER — HOSPITAL ENCOUNTER (OUTPATIENT)
Dept: NUCLEAR MEDICINE | Facility: HOSPITAL | Age: 49
Discharge: HOME | End: 2019-04-23

## 2019-05-01 ENCOUNTER — OFFICE VISIT (OUTPATIENT)
Dept: SURGERY | Facility: CLINIC | Age: 49
End: 2019-05-01

## 2019-05-01 VITALS
OXYGEN SATURATION: 99 % | DIASTOLIC BLOOD PRESSURE: 90 MMHG | WEIGHT: 293 LBS | TEMPERATURE: 98.6 F | BODY MASS INDEX: 45.99 KG/M2 | HEIGHT: 67 IN | SYSTOLIC BLOOD PRESSURE: 140 MMHG | HEART RATE: 80 BPM

## 2019-05-01 DIAGNOSIS — E83.52 HYPERCALCEMIA: ICD-10-CM

## 2019-05-01 DIAGNOSIS — D49.7 PARATHYROID TUMOR: Primary | ICD-10-CM

## 2019-05-01 DIAGNOSIS — E04.1 THYROID NODULE: Primary | ICD-10-CM

## 2019-05-01 PROCEDURE — 99213 OFFICE O/P EST LOW 20 MIN: CPT | Performed by: SURGERY

## 2019-05-01 RX ORDER — LEVOTHYROXINE SODIUM 0.05 MG/1
50 TABLET ORAL DAILY
Qty: 30 TABLET | Refills: 11 | Status: SHIPPED | OUTPATIENT
Start: 2019-05-01 | End: 2019-05-01

## 2019-05-01 NOTE — PROGRESS NOTES
Patient: Azalea Mendoza    YOB: 1970    Date: 05/01/2019    Primary Care Provider: Cedrick James DO    Chief Complaint   Patient presents with   • Follow-up     thyroid scan       History:   Patient is in the office to follow up on recent thyroid scan. Results show normal thyroid uptake. Patient states no changes since last visit.  Patient does have a large left thyroid nodule, thyroid uptake did not indicate a cold nodule.  Patient is not on Synthroid.  Patient also has hypercalcemia, she is concerned about it.  Patient supposed to see an endocrinologist but has not made that appointment yet.  No significant swallowing difficulties or difficulty breathing.    The following portions of the patient's history were reviewed and updated as appropriate: allergies, current medications, past family history, past medical history, past social history, past surgical history and problem list.      Review of Systems   Constitutional: Negative for chills, fever and unexpected weight change.   HENT: Negative for hearing loss, trouble swallowing and voice change.    Eyes: Negative for visual disturbance.   Respiratory: Negative for apnea, cough, chest tightness, shortness of breath and wheezing.    Cardiovascular: Negative for chest pain, palpitations and leg swelling.   Gastrointestinal: Negative for abdominal distention, abdominal pain, anal bleeding, blood in stool, constipation, diarrhea, nausea, rectal pain and vomiting.   Endocrine: Negative for cold intolerance and heat intolerance.   Genitourinary: Negative for difficulty urinating, dysuria and flank pain.   Musculoskeletal: Negative for back pain and gait problem.   Skin: Negative for color change, rash and wound.   Neurological: Negative for dizziness, syncope, speech difficulty, weakness, light-headedness, numbness and headaches.   Hematological: Negative for adenopathy. Does not bruise/bleed easily.   Psychiatric/Behavioral: Negative for  "confusion. The patient is not nervous/anxious.        Vital Signs  Vitals:    05/01/19 0837   BP: 140/90   Pulse: 80   Temp: 98.6 °F (37 °C)   SpO2: 99%   Weight: (!) 144 kg (317 lb)   Height: 170.2 cm (67\")       Allergies:  Allergies   Allergen Reactions   • Fish Allergy Anaphylaxis, Itching, Nausea And Vomiting and Nausea Only   • Neomycin-Bacitracin Zn-Polymyx Hives   • Nuts Unknown (See Comments)     Patient was allergy tested, list in chart under media.   • Ciprofloxacin-Dexamethasone Hives   • Eggs Or Egg-Derived Products Rash   • Penicillins Palpitations       Medications:    Current Outpatient Medications:   •  apixaban (ELIQUIS) 5 MG tablet tablet, Take 1 tablet by mouth Every 12 (Twelve) Hours., Disp: 60 tablet, Rfl: 0  •  cetirizine (zyrTEC) 10 MG tablet, Take 1 tablet (10 mg total) by mouth Every Night, Disp: 90 tablet, Rfl: 3  •  diclofenac (VOLTAREN) 1 % gel gel, Apply 4 g topically to the appropriate area as directed 4 (Four) Times a Day As Needed (bursitis ankle)., Disp: 5 tube, Rfl: 5  •  diltiaZEM (TIAZAC) 360 MG 24 hr capsule, Take 360 mg by mouth Daily., Disp: , Rfl:   •  EPIPEN 2-DAVID 0.3 MG/0.3ML solution auto-injector injection, USE AS INSTRUCTED, Disp: 2 each, Rfl: 1  •  glucose blood test strip, 1 each by Other route 3 (Three) Times a Day Use as instructed, Disp: 300 each, Rfl: 3  •  hydrOXYzine (ATARAX) 25 MG tablet, Take 1 tablet by mouth Every 6 (Six) Hours As Needed for Itching., Disp: 60 tablet, Rfl: 2  •  losartan (COZAAR) 100 MG tablet, Take 1 tablet (100 mg total) by mouth Daily, Disp: 90 tablet, Rfl: 3  •  metFORMIN ER (GLUCOPHAGE-XR) 500 MG 24 hr tablet, Take 1 tablet by mouth 2 (Two) Times a Day Before Meals., Disp: 360 tablet, Rfl: 3  •  mupirocin (BACTROBAN) 2 % cream, Apply topically 3 (Three) Times a Day, Disp: 15 g, Rfl: 6  •  omeprazole (priLOSEC) 20 MG capsule, TAKE ONE CAPSULE BY MOUTH ONCE DAILY, Disp: 90 capsule, Rfl: 1  •  predniSONE (DELTASONE) 10 MG (21) tablet pack, " Daily taper- 6/5/4/3/2/1, Disp: 21 tablet, Rfl: 0  •  tolterodine LA (DETROL LA) 4 MG 24 hr capsule, TAKE 1 CAPSULE BY MOUTH EVERY DAY, Disp: 90 capsule, Rfl: 1  •  triamterene-hydrochlorothiazide (MAXZIDE-25) 37.5-25 MG per tablet, Take 1 tablet by mouth Daily., Disp: 30 tablet, Rfl: 0  •  Vitamin D, Cholecalciferol, 1000 units tablet, Take 1,000 Units by mouth Daily., Disp: , Rfl:     Physical Exam:   General Appearance:    Alert, cooperative, in no acute distress   Head:    Normocephalic, without obvious abnormality, atraumatic  Neck-left thyroid nodule, mobile and nontender   Lungs:     Clear to auscultation,respirations regular, even and                  unlabored    Heart:    Regular rhythm and normal rate, normal S1 and S2, no            murmur, no gallop, no rub, no click   Abdomen:     Normal bowel sounds, no masses, no organomegaly, soft        non-tender, non-distended, no guarding, no rebound                tenderness   Extremities:   Moves all extremities well, no edema, no cyanosis, no             redness   Pulses:   Pulses palpable and equal bilaterally   Skin:   No bleeding, bruising or rash       Results Review:   I reviewed the patient's new clinical results.     ASSESSMENT/PLAN:    1. Thyroid nodule    2. Hypercalcemia      Recommend 6-month follow-up and repeat ultrasound.  Also recommend she see endocrinology for hypercalcemia.  May require a sestamibi scan if needed.  No parathyroid nodule seen on ultrasound in the office.  Electronically signed by Kang Polk MD  05/01/19

## 2019-05-07 ENCOUNTER — HOSPITAL ENCOUNTER (OUTPATIENT)
Dept: NUCLEAR MEDICINE | Facility: HOSPITAL | Age: 49
Discharge: HOME | End: 2019-05-07

## 2019-05-07 DIAGNOSIS — D49.7 PARATHYROID TUMOR: ICD-10-CM

## 2019-05-07 PROCEDURE — 0 TECHNETIUM SESTAMIBI: Performed by: SURGERY

## 2019-05-07 PROCEDURE — A9500 TC99M SESTAMIBI: HCPCS | Performed by: SURGERY

## 2019-05-07 PROCEDURE — 78070 PARATHYROID PLANAR IMAGING: CPT

## 2019-05-07 RX ADMIN — TECHNETIUM TC 99M SESTAMIBI 1 DOSE: 1 INJECTION INTRAVENOUS at 09:55

## 2019-05-20 ENCOUNTER — OFFICE VISIT (OUTPATIENT)
Dept: ENDOCRINOLOGY | Facility: CLINIC | Age: 49
End: 2019-05-20

## 2019-05-20 VITALS
SYSTOLIC BLOOD PRESSURE: 142 MMHG | DIASTOLIC BLOOD PRESSURE: 90 MMHG | OXYGEN SATURATION: 100 % | BODY MASS INDEX: 45.99 KG/M2 | WEIGHT: 293 LBS | HEART RATE: 79 BPM | HEIGHT: 67 IN

## 2019-05-20 DIAGNOSIS — E21.0 PRIMARY HYPERPARATHYROIDISM (HCC): ICD-10-CM

## 2019-05-20 DIAGNOSIS — IMO0002 UNCONTROLLED TYPE 2 DIABETES MELLITUS WITH PERIPHERAL NEUROPATHY: Primary | ICD-10-CM

## 2019-05-20 DIAGNOSIS — E04.2 MULTINODULAR GOITER (NONTOXIC): ICD-10-CM

## 2019-05-20 LAB
25(OH)D3 SERPL-MCNC: 21.2 NG/ML (ref 30–100)
ALBUMIN SERPL-MCNC: 4.6 G/DL (ref 3.5–5.2)
ANION GAP SERPL CALCULATED.3IONS-SCNC: 10.9 MMOL/L
BUN BLD-MCNC: 13 MG/DL (ref 6–20)
BUN/CREAT SERPL: 19.1 (ref 7–25)
CALCIUM SPEC-SCNC: 11.2 MG/DL (ref 8.6–10.5)
CHLORIDE SERPL-SCNC: 104 MMOL/L (ref 98–107)
CO2 SERPL-SCNC: 27.1 MMOL/L (ref 22–29)
CREAT BLD-MCNC: 0.68 MG/DL (ref 0.57–1)
GFR SERPL CREATININE-BSD FRML MDRD: 93 ML/MIN/1.73
GLUCOSE BLD-MCNC: 154 MG/DL (ref 65–99)
GLUCOSE BLDC GLUCOMTR-MCNC: 111 MG/DL (ref 70–130)
PHOSPHATE SERPL-MCNC: 2.2 MG/DL (ref 2.5–4.5)
POTASSIUM BLD-SCNC: 4.5 MMOL/L (ref 3.5–5.2)
PTH-INTACT SERPL-MCNC: 42.8 PG/ML (ref 15–65)
SODIUM BLD-SCNC: 142 MMOL/L (ref 136–145)

## 2019-05-20 PROCEDURE — 83970 ASSAY OF PARATHORMONE: CPT | Performed by: INTERNAL MEDICINE

## 2019-05-20 PROCEDURE — 99214 OFFICE O/P EST MOD 30 MIN: CPT | Performed by: INTERNAL MEDICINE

## 2019-05-20 PROCEDURE — 82947 ASSAY GLUCOSE BLOOD QUANT: CPT | Performed by: INTERNAL MEDICINE

## 2019-05-20 PROCEDURE — 80069 RENAL FUNCTION PANEL: CPT | Performed by: INTERNAL MEDICINE

## 2019-05-20 PROCEDURE — 82306 VITAMIN D 25 HYDROXY: CPT | Performed by: INTERNAL MEDICINE

## 2019-05-20 PROCEDURE — 76536 US EXAM OF HEAD AND NECK: CPT | Performed by: INTERNAL MEDICINE

## 2019-06-01 DIAGNOSIS — IMO0002 UNCONTROLLED TYPE 2 DIABETES MELLITUS WITH DIABETIC POLYNEUROPATHY, WITH LONG-TERM CURRENT USE OF INSULIN: ICD-10-CM

## 2019-06-01 RX ORDER — METFORMIN HYDROCHLORIDE 500 MG/1
TABLET, EXTENDED RELEASE ORAL
Qty: 360 TABLET | Refills: 3 | Status: SHIPPED | OUTPATIENT
Start: 2019-06-01 | End: 2019-06-02

## 2019-06-02 DIAGNOSIS — IMO0002 UNCONTROLLED TYPE 2 DIABETES MELLITUS WITH DIABETIC POLYNEUROPATHY, WITH LONG-TERM CURRENT USE OF INSULIN: ICD-10-CM

## 2019-06-02 PROBLEM — E04.2 MULTINODULAR GOITER (NONTOXIC): Status: ACTIVE | Noted: 2019-06-02

## 2019-06-02 RX ORDER — METFORMIN HYDROCHLORIDE 500 MG/1
500 TABLET, EXTENDED RELEASE ORAL
Qty: 180 TABLET | Refills: 3 | Status: SHIPPED | OUTPATIENT
Start: 2019-06-02

## 2019-08-20 ENCOUNTER — OFFICE VISIT (OUTPATIENT)
Dept: PULMONOLOGY | Facility: CLINIC | Age: 49
End: 2019-08-20

## 2019-08-20 VITALS — RESPIRATION RATE: 18 BRPM | HEIGHT: 67 IN | WEIGHT: 293 LBS | BODY MASS INDEX: 45.99 KG/M2

## 2019-08-20 DIAGNOSIS — Z01.811 PREOP PULMONARY/RESPIRATORY EXAM: ICD-10-CM

## 2019-08-20 DIAGNOSIS — E66.01 MORBID OBESITY, UNSPECIFIED OBESITY TYPE (HCC): ICD-10-CM

## 2019-08-20 DIAGNOSIS — G47.33 OBSTRUCTIVE SLEEP APNEA: Primary | ICD-10-CM

## 2019-08-20 PROCEDURE — 99214 OFFICE O/P EST MOD 30 MIN: CPT | Performed by: INTERNAL MEDICINE

## 2019-08-20 RX ORDER — DILTIAZEM HYDROCHLORIDE 360 MG/1
360 CAPSULE, EXTENDED RELEASE ORAL DAILY
Refills: 2 | COMMUNITY
Start: 2019-06-03 | End: 2019-09-21 | Stop reason: HOSPADM

## 2019-08-20 RX ORDER — ESCITALOPRAM OXALATE 10 MG/1
10 TABLET ORAL 2 TIMES DAILY
Refills: 2 | COMMUNITY
Start: 2019-07-27 | End: 2021-09-13

## 2019-08-20 NOTE — PROGRESS NOTES
"Chief Complaint   Patient presents with   • Follow-up   • Sleeping Problem         Subjective   Azalea Mendoza is a 48 y.o. female.     History of Present Illness   Patient comes back today for follow up of Sleep apnea.     Patient doesn't report any issues with the device. The patient describes no significant issues with her mask either. Patient says that she is noticing occasional issues with the device where it doesn't seem to \"work as well\".    Patient says that the compliance with the use of the equipment is good.     Patient says that her symptoms of fatigue & daytime sleepiness have been helped greatly with the use of PAP, as prescribed.     She is also being planned for parathyroidectomy soon, at [facility].     The following portions of the patient's history were reviewed and updated as appropriate: allergies, current medications, past family history, past medical history, past social history and past surgical history.    Review of Systems   HENT: Positive for sinus pressure. Negative for sneezing and sore throat.    Respiratory: Negative for cough, shortness of breath and wheezing.        Objective   Visit Vitals  Resp 18   Ht 170.2 cm (67.01\")   Wt (!) 141 kg (310 lb)   BMI 48.54 kg/m²       Physical Exam   Constitutional: She is oriented to person, place, and time. She appears well-developed and well-nourished.   HENT:   Head: Atraumatic.   Crowded oropharynx.    Musculoskeletal:   Gait was normal.   Neurological: She is alert and oriented to person, place, and time.   Psychiatric: She has a normal mood and affect.   Vitals reviewed.      Assessment/Plan   Azalea was seen today for follow-up and sleeping problem.    Diagnoses and all orders for this visit:    Obstructive sleep apnea  -     BIPAP / CPAP Adjustment    Morbid obesity, unspecified obesity type (CMS/HCC)  -     BIPAP / CPAP Adjustment    Preop pulmonary/respiratory exam           Return in about 4 months (around 12/20/2019) for " Eliana/Shantel.    DISCUSSION (if any):  I reviewed the results of last sleep study in detail. I informed her that the apnea hypopnea index was 27/ hr. This was an in lab study done in 2010.     Continue treatment with CPAP at a pressure of 10, with nasal pillows.    Patient seems to be compliant with PAP device, based on the available data and her account of improved symptoms.     Will try to get her a new CPAP device, before     Weight loss advised.    The patient was once again reminded to continue using the PAP device regularly, every night for atleast 4 hours.    I spent a total of 30 minutes face to face with this patient. his pertinent current and previous data, as applicable, were reviewed. Patient's diagnostic studies were also reviewed. More than 15 minutes of the time spent, was spent in counseling about patient's underlying disease process, reviewing any available sleep studies, need to use devices (as applicable) on a regular basis and lifestyle modifications that may positively impact patient's health.       From Pulmonary stand point, her risk for the proposed procedure appears to be.  Medium    Postoperative recommendations:            * Out of bed to chair, as soon as feasible.            * Incentive spirometry every hour.            * Minimize the use of NG tube.            * Keep O2sat at 88% or more, with minimum supplemental O2.            * Minimize anesthesia time, as much as possible            * Inform anesthesiologist of her diagnosis of obstructive sleep apnea            * Consider ABG, preop and postop, if necessary.            * Consider using PAP device, if indicated post op.    Pulmonary risk stratification needs to be discussed with the physician performing the procedure and, apart from procedures involving pulmonary resection, should not be used alone to determine patient's appropriateness for the planned procedure.        Dictated utilizing Dragon dictation.    This document was  electronically signed by Rafy Schneider MD on 08/20/19 at 11:38 AM

## 2019-09-04 DIAGNOSIS — G47.33 OSA (OBSTRUCTIVE SLEEP APNEA): Primary | ICD-10-CM

## 2019-09-11 ENCOUNTER — TELEPHONE (OUTPATIENT)
Dept: INTERNAL MEDICINE | Facility: CLINIC | Age: 49
End: 2019-09-11

## 2019-09-11 NOTE — TELEPHONE ENCOUNTER
SINCE PT. HAD SURGERY ON 08/03, SHE CAN NOT KEEP HER SUGAR UNDER CONTROL; IT'S RUNNING IN 'S; SHE HAS AN APPT. ON THE 25TH BUT HER SURGEON SUGGESTED THAT SHE TRY TO GET IN SOONER; CAN WE FIT PT. IN AN EARLIER APPT?  PLEASE CALL AND ADVISE [phone]

## 2019-09-11 NOTE — TELEPHONE ENCOUNTER
Dr. Beckett is out all next week. Will add patient to the cancellation list to see [facility] if something comes available next week.

## 2019-09-12 ENCOUNTER — TELEPHONE (OUTPATIENT)
Dept: INTERNAL MEDICINE | Facility: CLINIC | Age: 49
End: 2019-09-12

## 2019-09-12 DIAGNOSIS — IMO0002 UNCONTROLLED TYPE 2 DIABETES MELLITUS WITH COMPLICATION, WITH LONG-TERM CURRENT USE OF INSULIN: Primary | ICD-10-CM

## 2019-09-12 RX ORDER — INSULIN ASPART 100 [IU]/ML
INJECTION, SUSPENSION SUBCUTANEOUS
Qty: 5 PEN | Refills: 5 | Status: SHIPPED | OUTPATIENT
Start: 2019-09-12 | End: 2019-09-25 | Stop reason: SDUPTHER

## 2019-09-12 NOTE — TELEPHONE ENCOUNTER
Spoke to patient. Restarted insulin. Cannot use SGLT-2 due to her h/o overactive bladder.    She will try Ozempic sample again to see if tolerated  Start Novolog 70/30: 20 units BID AC    Skylar Beckett MD

## 2019-09-15 RX ORDER — PEN NEEDLE, DIABETIC 30 GX3/16"
1 NEEDLE, DISPOSABLE MISCELLANEOUS
Qty: 100 EACH | Refills: 3 | Status: SHIPPED | OUTPATIENT
Start: 2019-09-15 | End: 2019-11-25 | Stop reason: SDUPTHER

## 2019-09-25 ENCOUNTER — OFFICE VISIT (OUTPATIENT)
Dept: ENDOCRINOLOGY | Facility: CLINIC | Age: 49
End: 2019-09-25

## 2019-09-25 VITALS
BODY MASS INDEX: 45.99 KG/M2 | WEIGHT: 293 LBS | OXYGEN SATURATION: 99 % | DIASTOLIC BLOOD PRESSURE: 70 MMHG | SYSTOLIC BLOOD PRESSURE: 104 MMHG | HEART RATE: 79 BPM | HEIGHT: 67 IN

## 2019-09-25 DIAGNOSIS — E04.2 MULTINODULAR GOITER (NONTOXIC): ICD-10-CM

## 2019-09-25 DIAGNOSIS — IMO0002 UNCONTROLLED TYPE 2 DIABETES MELLITUS WITH COMPLICATION, WITH LONG-TERM CURRENT USE OF INSULIN: Primary | ICD-10-CM

## 2019-09-25 LAB
GLUCOSE BLDC GLUCOMTR-MCNC: 164 MG/DL (ref 70–130)
HBA1C MFR BLD: 9.3 %

## 2019-09-25 PROCEDURE — 83036 HEMOGLOBIN GLYCOSYLATED A1C: CPT | Performed by: INTERNAL MEDICINE

## 2019-09-25 PROCEDURE — 82947 ASSAY GLUCOSE BLOOD QUANT: CPT | Performed by: INTERNAL MEDICINE

## 2019-09-25 PROCEDURE — 99214 OFFICE O/P EST MOD 30 MIN: CPT | Performed by: INTERNAL MEDICINE

## 2019-09-25 RX ORDER — SULFAMETHOXAZOLE AND TRIMETHOPRIM 800; 160 MG/1; MG/1
TABLET ORAL
COMMUNITY
Start: 2019-09-22 | End: 2019-12-12

## 2019-09-25 RX ORDER — PRAVASTATIN SODIUM 20 MG
20 TABLET ORAL DAILY
Qty: 30 TABLET | Refills: 5 | Status: SHIPPED | OUTPATIENT
Start: 2019-09-25 | End: 2020-01-24

## 2019-09-25 RX ORDER — INSULIN ASPART 100 [IU]/ML
INJECTION, SUSPENSION SUBCUTANEOUS
Qty: 10 PEN | Refills: 5 | OUTPATIENT
Start: 2019-09-25 | End: 2022-10-04

## 2019-09-25 RX ORDER — CONJUGATED ESTROGENS 0.62 MG/G
CREAM VAGINAL
COMMUNITY
Start: 2019-09-22

## 2019-09-25 NOTE — PROGRESS NOTES
"Chief Complaint   Patient presents with   • Diabetes     Dm 2 f/u    • Primary hyperparathyroidism     f/u      HPI:   Azalea Mendoza is a 48 y.o.female who [facility] for f/u evaluation of her Type 2 DM and h/o hypercalcemia due to PHPTH. Last visit 05/20/2019. Her history is as follows:    Interim Events:   - s/p right parathyroidectomy 09/02/2019 by Dr. Diana Yin at [facility]  - recent UTI  - Restarted insulin 09/11/2019. Pt has been calling me with BG log, doses of insulin have been titrated  - pt did not tolerate Ozempic, caused significant headache    1) Type 2 DM with associated complications of mild peripheral neuropathy:   - Diagnosed approximately in her 30's  - started insulin in 2016. Stopped insulin in 02/2018 after significant weight loss with WWP  - Restarted insulin 09/11/2019. Pt has been calling me with BG log, doses of insulin have been titrated  - pt did not tolerate Ozempic, caused significant headache  - did not try SGLT-2 inh due to pt's h/o overactive bladder     Current DM Medications:  - metformin  mg BID: tolerating, GI symptoms with higher doses  - Novolog 70/30: 34 units AC BID    Glucometer: no meter today    DM Health Maintenance:  Ophtho: Last visit 02/2018, no retinopathy per pt  Podiatry: no recent visit  Monofilament / Foot exam: due  Lipids: (05/2019) Tchol 216, , HDL 50,  - not on statin  Urine Microalb/Cr ratio: (11/2017) normal at 7.2 - on ARB   TSH: (05/2019) normal at 2.120  CBC: (05/2019) Hgb 15.2  CMP: (05/2019) Cr 0.76, , LFTs WNL  Aspirin: N/A    2) Primary hyperparathyroidism: controled with recent right parathyroidectomy  - s/p right parathyroidectomy 09/02/2019 by Dr. Diana Yin at [facility]    3) Multinodular goiter:  - first found on physical exam in 2015. Thyroid US at that time described a \"4.3 cm mixed cystic and solid thyroid nodule\" in the left lobe.   - s/p FNA of the left lobe nodule in 2015. Cytology was benign per " pt.   - Has had serial Thyroid US completed in radiology that show essentially stable size of the left lobe nodules.    [facility] Thyroid US (05/2019):   1) Asymmetric enlarged thyroid gland with the right lobe normal in size and the left lobe enlarged. Findings were consistent with a multinodular goiter.   2) A subcentimeter nodule with no suspicious US characteristics was identified in the right mid lobe as described above.  3) Inferior to the right inferior pole, a 1.96(L) x 1.25(AP) x 1.18(T) cm hypoechoic lesion was identified. The margin was not well defined. This finding was suggestive of a parathyroid adenoma but does not correlate with NM parathyroid scan imaging which showed no adenoma.   4) In the left inferior pole, a 1.69(L) x 1.22(AP) x 1.86(T) cm isoechoic, solid nodule was identified with ultrasound characteristics as described above.  5) Occupying most of the left lobe, a  4.75(L) x 2.31(AP) x 3.21(T) cm isoechoic, mixed solid and cystic nodule was identified. The nodule had a smooth margin, minimal peripheral vascularity, and no microcalcifications. The nodule appeared stable in size in comparison to imaging from 2015 - 2019.  RECOMMENDATIONS: Repeat FNA of the left lobe nodule not indicated. Based on the lack of suspicious US characteristics and prior h/o benign cytology of the large left lobe nodule, repeat Thyroid US recommended if changes in the gland/neck are noted on physical exam.    Review of Systems   Constitutional: Negative.  Negative for fatigue.        Weight gain, 20 lbs since last visit   HENT: Negative.    Eyes: Negative.    Respiratory: Positive for shortness of breath (with exertion).    Cardiovascular: Negative.  Negative for palpitations.   Gastrointestinal: Negative.    Endocrine: Negative.  Negative for heat intolerance.   Genitourinary: Negative.    Musculoskeletal: Positive for arthralgias (improving with weight loss). Negative for joint swelling.   Skin: Negative.   "  Allergic/Immunologic: Negative.    Neurological: Positive for numbness (feet, mild).   Hematological: Negative.    Psychiatric/Behavioral: Negative for sleep disturbance.     The following portions of the patient's history were reviewed and updated as appropriate: allergies, current medications, past family history, past medical history, past social history, past surgical history and problem list.    /70   Pulse 79   Ht 170.2 cm (67.01\")   Wt (!) 155 kg (341 lb)   SpO2 99%   BMI 53.39 kg/m²   Physical Exam   Constitutional: She is oriented to person, place, and time. She appears well-developed. No distress.   obese   HENT:   Head: Normocephalic.   Mouth/Throat: Oropharynx is clear and moist.   Eyes: Conjunctivae and EOM are normal. Pupils are equal, round, and reactive to light.   Neck: No tracheal deviation present. Thyromegaly (mild, left lobe enlarged, right lobe normal in size) present.   No palpable thyroid nodules     Cardiovascular: Normal rate, regular rhythm and normal heart sounds.   No murmur heard.  Pulmonary/Chest: Effort normal and breath sounds normal. No respiratory distress.   Abdominal: Soft. Bowel sounds are normal. She exhibits no mass. There is no tenderness.   Lymphadenopathy:     She has no cervical adenopathy.   Neurological: She is alert and oriented to person, place, and time. No cranial nerve deficit.   Skin: Skin is warm and dry. She is not diaphoretic. No erythema.   Psychiatric: She has a normal mood and affect. Her behavior is normal.   Vitals reviewed.    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 Samples given. Reviewed potential side effects. Pt to call for Rx if tolerated  - Pt to call me with BG readings in 2 weeks    2) Primary hyperparathyroidism: controlled after parathyroidectomy    3) multinodular goiter: stable on exam  - Thyroid US completed in clinic 05/2019 reviewed.  -  Repeat FNA of the left lobe nodule not indicated. Based on the lack of suspicious US characteristics and prior h/o benign cytology of the large left lobe nodule, repeat Thyroid US recommended if changes in the gland/neck are noted on physical exam.      RTC 4 months    Counseling was given to patient for the following topics:  instructions for management, risks and benefits of treatment options and importance of treatment compliance, see details in assessment/plan. Total face to face time of the encounter was 30 minutes and 20 minutes was spent counseling.

## 2019-09-28 PROBLEM — E21.0 PRIMARY HYPERPARATHYROIDISM: Status: RESOLVED | Noted: 2018-06-11 | Resolved: 2019-09-28

## 2019-09-28 PROBLEM — E83.52 HYPERCALCEMIA: Status: RESOLVED | Noted: 2017-11-07 | Resolved: 2019-09-28

## 2019-10-08 ENCOUNTER — OFFICE VISIT (OUTPATIENT)
Dept: PULMONOLOGY | Facility: CLINIC | Age: 49
End: 2019-10-08

## 2019-10-08 VITALS
OXYGEN SATURATION: 95 % | HEART RATE: 80 BPM | SYSTOLIC BLOOD PRESSURE: 118 MMHG | DIASTOLIC BLOOD PRESSURE: 70 MMHG | WEIGHT: 293 LBS | BODY MASS INDEX: 53.86 KG/M2

## 2019-10-08 DIAGNOSIS — G47.33 OSA (OBSTRUCTIVE SLEEP APNEA): ICD-10-CM

## 2019-10-08 DIAGNOSIS — R91.1 LUNG NODULE: Primary | ICD-10-CM

## 2019-10-08 PROCEDURE — 99213 OFFICE O/P EST LOW 20 MIN: CPT | Performed by: NURSE PRACTITIONER

## 2019-10-08 NOTE — PROGRESS NOTES
Chief Complaint   Patient presents with   • Follow-up   • Sleeping Problem         Subjective   Azalea Mendoza is a 48 y.o. female.     History of Present Illness   The patient comes in today to follow-up on a recent CT scan.    She denies any productive cough and admits to an intermittent dry cough.  She denies shortness of breath, night sweats, or hemoptysis.    She had a nuclear medicine parathyroid scan with general surgery at  on August 22, 2019 which showed an 8 mm lung nodule in the right upper lobe.  She was recommended to follow-up with our clinic regarding this since she is a patient of ours already.    She is doing well with her CPAP and has no issues tolerating the machine.    The following portions of the patient's history were reviewed and updated as appropriate: allergies, current medications, past family history, past medical history, past social history and past surgical history.    Review of Systems   Constitutional: Negative for chills and fever.   HENT: Positive for rhinorrhea. Negative for sinus pressure and sore throat.    Respiratory: Positive for cough. Negative for chest tightness, shortness of breath and wheezing.    Psychiatric/Behavioral: Negative for sleep disturbance.       Objective   Visit Vitals  /70   Pulse 80   Wt (!) 156 kg (344 lb)   SpO2 95%   BMI 53.86 kg/m²     Physical Exam   Constitutional: She is oriented to person, place, and time. She appears well-developed and well-nourished.   HENT:   Head: Normocephalic and atraumatic.   Crowded oropharynx.    Pulmonary/Chest: Effort normal and breath sounds normal. She has no wheezes.   Musculoskeletal:   Gait was normal.   Neurological: She is alert and oriented to person, place, and time.   Psychiatric: She has a normal mood and affect.   Vitals reviewed.          Assessment/Plan   Azalea was seen today for follow-up and sleeping problem.    Diagnoses and all orders for this visit:    Lung nodule  -     CT Chest  Without Contrast; Future    LUCERO (obstructive sleep apnea)           Return in about 3 months (around 1/8/2020) for Recheck, Imaging studies, For Me.    DISCUSSION (if any):  We discussed the findings of the 8 mm nodule.  She will have a CT of the chest in December to ensure stability of the nodule.    She should continue using CPAP therapy for treatment of obstructive sleep apnea.  She is compliant with this at 100%.      Dictated utilizing Dragon dictation.    This document was electronically signed by JARRET Robert October 8, 2019  1:54 PM

## 2019-10-23 ENCOUNTER — OFFICE VISIT (OUTPATIENT)
Dept: ORTHOPEDIC SURGERY | Facility: CLINIC | Age: 49
End: 2019-10-23

## 2019-10-23 VITALS — WEIGHT: 293 LBS | OXYGEN SATURATION: 98 % | HEIGHT: 67 IN | BODY MASS INDEX: 45.99 KG/M2 | HEART RATE: 91 BPM

## 2019-10-23 DIAGNOSIS — M17.0 PRIMARY OSTEOARTHRITIS OF BOTH KNEES: Primary | ICD-10-CM

## 2019-10-23 PROCEDURE — 99213 OFFICE O/P EST LOW 20 MIN: CPT | Performed by: ORTHOPAEDIC SURGERY

## 2019-10-23 NOTE — PROGRESS NOTES
[facility] [place] [facility] Note    Subjective     No chief complaint on file.       HPI    Azalea Mendoza is a 48 y.o. female.  She presents today for evaluation of bilateral knee pain, right greater than left.  Pain has been present for 5 years, following a particular injury.  Pain is associated with swelling, popping, grinding, stiffness and giving way.  It worsens with walking, standing, sitting and climbing stairs.  Previous injections with no long-term relief.  She has tried physical therapy and oral steroids as well as bracing without relief.  The pain is aching, burning and stabbing.  She has tried weight loss in the past, but gained the weight back.  Pain is 8 out of 10 today, and worsening over time.      Patient Active Problem List   Diagnosis   • Abnormal liver enzymes   • Obesity   • Arthralgia of multiple joints   • Gastroesophageal reflux disease with esophagitis   • Mixed hyperlipidemia   • Neuralgia   • Obstructive sleep apnea syndrome in adult   • Allergic rhinitis due to pollen   • Asthma   • Benign essential hypertension   • Hyperactivity of bladder   • Irritable bowel syndrome   • Lymphadenopathy, axillary   • Avulsed toenail   • Uncontrolled type 2 diabetes mellitus with peripheral neuropathy (CMS/HCC)   • Back pain   • Neck pain   • Cyst of skin   • Hematuria   • Mass of breast   • Spasm   • Proteinuria   • Knee pain   • Herpes zoster   • Rash   • Atrial fibrillation with rapid ventricular response (CMS/HCC)   • Leukocytosis   • Chronic pain of right ankle   • Venous stasis   • Multinodular goiter (nontoxic)     Past Medical History:   Diagnosis Date   • A-fib (CMS/HCC)    • Acute bronchitis with bronchospasm    • Acute maxillary sinusitis    • Acute suppurative otitis media of both ears without spontaneous rupture of tympanic membranes    • Allergic rhinitis due to pollen    • Arthritis    • Asthma    • Back pain, lumbosacral    • Cervicalgia    • Cyst of breast    • Diabetes  mellitus (CMS/HCC)    • Esophagitis    • GERD (gastroesophageal reflux disease)    • H/O cystoscopy     diagnostic cystoscopy   • Heart murmur    • Hyperlipidemia    • Hypertension    • IBS (irritable bowel syndrome)    • Kidney infection    • Lump or mass in breast    • Muscle spasm    • Ovarian cancer (CMS/HCC)    • Overactive bladder    • Primary hyperparathyroidism (CMS/HCC) 6/11/2018   • Right knee pain    • Shingles    • Sleep apnea    • Thyroid nodule    • UTI (urinary tract infection)       Past Surgical History:   Procedure Laterality Date   • CARDIAC CATHETERIZATION     • COLONOSCOPY     • CYST REMOVAL     • CYSTOSCOPY     • THYROID SURGERY  09/22/2019    right   • TOTAL ABDOMINAL HYSTERECTOMY WITH SALPINGO OOPHORECTOMY     • TUMOR REMOVAL        Family History   Problem Relation Age of Onset   • Hypertension Mother    • Diabetes Mother         type 2   • Arthritis Mother    • Arthritis Father    • Hypertension Father    • Hyperlipidemia Father    • Diabetes Father         type 2   • Stroke Father    • Cancer Other    • Osteoporosis Maternal Grandmother      Social History     Socioeconomic History   • Marital status:      Spouse name: Not on file   • Number of children: Not on file   • Years of education: Not on file   • Highest education level: Not on file   Tobacco Use   • Smoking status: Never Smoker   • Smokeless tobacco: Never Used   Substance and Sexual Activity   • Alcohol use: No   • Drug use: No   • Sexual activity: Defer      Current Outpatient Medications on File Prior to Visit   Medication Sig Dispense Refill   • apixaban (ELIQUIS) 5 MG tablet tablet Take 1 tablet by mouth Every 12 (Twelve) Hours. 60 tablet 0   • cetirizine (zyrTEC) 10 MG tablet Take 1 tablet (10 mg total) by mouth Every Night 90 tablet 3   • diclofenac (VOLTAREN) 1 % gel gel Apply 4 g topically to the appropriate area as directed 4 (Four) Times a Day As Needed (bursitis ankle). 5 tube 5   • diltiaZEM (TIAZAC) 360 MG 24  hr capsule Take 360 mg by mouth Daily.     • Dulaglutide (TRULICITY) 0.75 MG/0.5ML solution pen-injector Inject 0.75 mg under the skin into the appropriate area as directed 1 (One) Time Per Week. 2 pen 0   • EPIPEN 2-DAVID 0.3 MG/0.3ML solution auto-injector injection USE AS INSTRUCTED 2 each 1   • escitalopram (LEXAPRO) 10 MG tablet Take 10 mg by mouth Daily.  2   • glucose blood test strip 1 each by Other route 3 (Three) Times a Day Use as instructed 300 each 3   • hydrOXYzine (ATARAX) 25 MG tablet Take 1 tablet by mouth Every 6 (Six) Hours As Needed for Itching. 60 tablet 2   • Insulin Pen Needle (PEN NEEDLES) 32G X 4 MM misc 1 each 2 (Two) Times a Day Before Meals. 100 each 3   • losartan (COZAAR) 100 MG tablet Take 1 tablet (100 mg total) by mouth Daily 90 tablet 3   • metFORMIN ER (GLUCOPHAGE-XR) 500 MG 24 hr tablet Take 1 tablet by mouth 2 (Two) Times a Day Before Meals. 180 tablet 3   • NOVOLOG MIX 70/30 FLEXPEN (70-30) 100 UNIT/ML suspension pen-injector injection 40 units before breakfast and 40 units before dinner 10 pen 5   • omeprazole (priLOSEC) 20 MG capsule TAKE ONE CAPSULE BY MOUTH ONCE DAILY 90 capsule 1   • pravastatin (PRAVACHOL) 20 MG tablet Take 1 tablet by mouth Daily. 30 tablet 5   • PREMARIN 0.625 MG/GM vaginal cream      • sulfamethoxazole-trimethoprim (BACTRIM DS,SEPTRA DS) 800-160 MG per tablet      • tolterodine LA (DETROL LA) 4 MG 24 hr capsule TAKE 1 CAPSULE BY MOUTH EVERY DAY 90 capsule 1   • triamterene-hydrochlorothiazide (MAXZIDE-25) 37.5-25 MG per tablet Take 1 tablet by mouth Daily. 30 tablet 0   • Vitamin D, Cholecalciferol, 1000 units tablet Take 1,000 Units by mouth Daily.       No current facility-administered medications on file prior to visit.       Allergies   Allergen Reactions   • Fish Allergy Anaphylaxis, Itching, Nausea And Vomiting and Nausea Only   • Neomycin-Bacitracin Zn-Polymyx Hives   • Nuts Unknown (See Comments)     Patient was allergy tested, list in chart under  "media.   • Ciprofloxacin-Dexamethasone Hives   • Eggs Or Egg-Derived Products Rash   • Penicillins Palpitations        Review of Systems   Constitutional: Positive for activity change and fatigue.   HENT: Positive for sinus pressure.    Eyes: Negative.    Respiratory: Positive for apnea.    Cardiovascular: Positive for palpitations.   Gastrointestinal: Negative.    Endocrine: Positive for heat intolerance.   Genitourinary: Positive for frequency.   Musculoskeletal: Positive for arthralgias and joint swelling.   Skin: Negative.    Allergic/Immunologic: Positive for environmental allergies and food allergies.   Neurological: Positive for weakness and numbness.   Hematological: Negative.    Psychiatric/Behavioral: Positive for decreased concentration and sleep disturbance. The patient is nervous/anxious.         Objective      Physical Exam  Pulse 91   Ht 170.2 cm (67.01\")   Wt (!) 156 kg (343 lb 14.7 oz)   SpO2 98%   BMI 53.85 kg/m²     Body mass index is 53.85 kg/m².    General:   Mental Status:  Alert   Appearance: Cooperative, in no acute distress   Build and Nutrition: Obese female   Orientation: Alert and oriented to person, place and time   Posture: Normal   Gait: Antalgic gait    Integument:   Right knee: no skin lesions, no rash, no ecchymosis   Left knee: no skin lesions, no rash, no ecchymosis    Neurologic:   Sensation:    Right foot: intact to light touch on the dorsal and plantar aspect    Left foot: intact to light touch on the dorsal and plantar aspect   Motor:  Right lower extremity: 5/5 quadriceps, hamstrings, ankle dorsiflexors, and ankle plantar flexors  Left lower extremity: 5/5 quadriceps, hamstrings, ankle dorsiflexors, and ankle plantar flexors  Vascular:   Right lower extremity: 2+ dorsalis pedis pulse, prompt capillary refill   Left lower extremity: 2+ dorsalis pedis pulse, prompt capillary refill    Lower Extremities:   Right Knee:    Tenderness:  Joint line tenderness    Effusion: "  None    Swelling:  None    Crepitus:  Positive    Atrophy:  None    Range of motion:  Extension: 0°       Flexion: 100°  Instability:  No varus laxity, no valgus laxity, negative anterior drawer  Deformities:  None   Left Knee:    Tenderness:  Joint line tenderness    Effusion:  None    Swelling:  None    Crepitus: Positive    Atrophy:  None    Range of motion:  Extension: 0°       Flexion: 100°  Instability:  No varus laxity, no valgus laxity, negative anterior drawer  Deformities:  None      Imaging/Studies      Imaging Results (last 24 hours)     Procedure Component Value Units Date/Time    XR Knee 4+ View Bilateral [[id number]] Resulted:  10/23/19 1212     Updated:  10/23/19 1213    Narrative:       Right Knee Radiographs  Indication: right knee pain  Views: Standing AP's and skiers of both knees, with lateral and sunrise   views of the right knee    Comparison: no prior studies available    Findings:   Bone-on-bone contact medial compartment, with prior compartment   osteophytes, varus alignment, with no acute bony abnormalities.  No   unusual bony features.    Left Knee Radiographs  Indication: left knee pain  Views: Standing AP's and skiers of both knees, with lateral and sunrise   views of the left knee    Comparison: no prior studies available    Findings:   Near bone-on-bone contact medial compartment, with tricompartment   osteophytes, no acute bony abnormalities, and no unusual bony features.          Assessment and Plan     Diagnoses and all orders for this visit:    Primary osteoarthritis of both knees  -     XR Knee 4+ View Bilateral    Other orders  -     Cancel: Large Joint Arthrocentesis  -     Cancel: Large Joint Arthrocentesis        1. Primary osteoarthritis of both knees        I reviewed my findings with the patient today.  She has bilateral knee pain, right greater than left.  Pain is secondary to arthritis, and at some point she is a candidate for knee replacement surgery.  Previous  injections and conservative treatment have not provided long-term relief.  We discussed her body mass index, which is 53, and would recommend her body mass index below 40 before consideration of surgery.  She is looking at weight loss options currently, and is committed to weight loss now that she knows what is required.  I will see her back in 6 months, but sooner for any problems.    Return in about 6 months (around 4/23/2020).      Medical Decision Making  Data/Risk: radiology tests and independent visualization of imaging, lab tests, or EMG/NCV      Zain Verdin MD  10/23/19  5:46 PM

## 2019-10-31 ENCOUNTER — TRANSCRIBE ORDERS (OUTPATIENT)
Dept: ADMINISTRATIVE | Facility: HOSPITAL | Age: 49
End: 2019-10-31

## 2019-10-31 DIAGNOSIS — G56.02 CARPAL TUNNEL SYNDROME ON LEFT: ICD-10-CM

## 2019-10-31 DIAGNOSIS — G56.01 CARPAL TUNNEL SYNDROME ON RIGHT: Primary | ICD-10-CM

## 2019-11-07 ENCOUNTER — TELEPHONE (OUTPATIENT)
Dept: INTERNAL MEDICINE | Facility: CLINIC | Age: 49
End: 2019-11-07

## 2019-11-08 ENCOUNTER — HOSPITAL ENCOUNTER (OUTPATIENT)
Dept: NEUROLOGY | Facility: HOSPITAL | Age: 49
Discharge: HOME | End: 2019-11-08
Admitting: ORTHOPAEDIC SURGERY

## 2019-11-08 DIAGNOSIS — G56.02 CARPAL TUNNEL SYNDROME ON LEFT: ICD-10-CM

## 2019-11-08 DIAGNOSIS — G56.01 CARPAL TUNNEL SYNDROME ON RIGHT: ICD-10-CM

## 2019-11-08 PROCEDURE — 95910 NRV CNDJ TEST 7-8 STUDIES: CPT

## 2019-11-08 PROCEDURE — 95886 MUSC TEST DONE W/N TEST COMP: CPT

## 2019-11-22 NOTE — TELEPHONE ENCOUNTER
Charles Cobos, just letting you know that Dr. Marie is okay with patient switching to him. I would have sent this back to Gracia but she and I will both be out of the office next week.

## 2019-11-25 DIAGNOSIS — IMO0002 UNCONTROLLED TYPE 2 DIABETES MELLITUS WITH PERIPHERAL NEUROPATHY: Primary | ICD-10-CM

## 2019-11-25 RX ORDER — PEN NEEDLE, DIABETIC 30 GX3/16"
1 NEEDLE, DISPOSABLE MISCELLANEOUS
Qty: 200 EACH | Refills: 3 | Status: SHIPPED | OUTPATIENT
Start: 2019-11-25

## 2019-11-26 ENCOUNTER — TELEPHONE (OUTPATIENT)
Dept: PAIN MEDICINE | Facility: CLINIC | Age: 49
End: 2019-11-26

## 2019-11-26 NOTE — TELEPHONE ENCOUNTER
Called patient to schedule her with . Patient stated it took to long for us to get back with her, so she went to a different office.

## 2019-12-06 PROBLEM — M17.0 BILATERAL PRIMARY OSTEOARTHRITIS OF KNEE: Status: ACTIVE | Noted: 2019-12-06

## 2019-12-07 NOTE — PROGRESS NOTES
"Chief Complaint: \"Pain in both of my knees, right worse than left.\"         History of Present Illness:   Patient: Ms. Azalea Mendoza, 49 y.o. female   Referring Physician: Dr. Mickie Carrero   Reason for Referral: Consultation for intractable chronic bilateral knee pain. Patient has seen Dr. Verdin and was found not to be a candidate for TKR due to her morbid obesity   Pain History: Patient reports a 5-year history of pain, which began without incident. Pain has progressed in intensity over the over the past year.   Pain Description: Constant pain with intermittent exacerbation, described as aching, burning and stabbing sensation.   Radiation of Pain: The bilateral knee pain radiates into the infrapatellar region. The right knee pain is more severe than the left   Pain intensity today: 6/10  Average pain intensity last week: 8/10  Pain intensity ranges from: 5/10 to 10/10  Aggravating factors: Pain increases with twisting, bending, climbing stairs, standing longer than 5-10 minutes, and ambulating more than 5 minutes.  Alleviating factors: Pain decreases with sitting, lying, heat and ice.      Associated Symptoms:   Patient denies numbness or weakness in the lower extremities  Patient denies any new bladder or bowel problems  Patient denies difficulties with her balance or falls    Review of previous therapies and additional medical records:  Azalea Mendoza has already failed the following measures,  including:   Conservative measures: Oral analgesics, opioids, topical analgesics, ice and heat, physical therapy  Interventional measures: Patient reports that she underwent knee injections in the past initially with steroids (which increase her glycemia significantly, patient was still on oral antidiabetic medication) and more recently with Euflexxa x1  Surgical measures: No history of previous knee surgery  Azalea Mendoza underwent orthopedic surgical consultation with Dr. Zain Verdin on 10/23/2019, and was " found to be a surgical candidate only if patient could get her body mass index below 40  Azalea Mendoza presents with significant comorbidities including asthma, GERD, sleep apnea on CPAP, morbid obesity, insulin dependant diabetes, hyperlipidemia, hypertension, atrial fibrillation on Eliquis, irritable bowel syndrome, overactive bladder, engaged in treatment.  In terms of current analgesics, Azalea Mendoza takes: Tylenol and Voltaren gel.  Patient also takes hydroxyzine, Lexapro  I have reviewed Jovan Report #[id number] consistent to medication reconciliation.     Global Pain Scale 12-10  2019                 Pain  20                 Feelings  10                 Clinical outcomes  18                 Activities  22                 GPS Total:  70                    Review of Diagnostic Studies:   X-Ray Right Knee on 10/23/2019: Bone-on-bone contact medial compartment, with prior compartment osteophytes, varus alignment, with no acute bony abnormalities. No unusual bony features.  X-Ray Left Knee on 10/23/2019: Near bone-on-bone contact medial compartment, with tricompartment osteophytes, no acute bony abnormalities, and no unusual bony features.    Review of Systems   HENT: Positive for sinus pressure.    Respiratory: Positive for apnea.    Cardiovascular: Positive for palpitations.   Endocrine: Positive for heat intolerance.   Musculoskeletal: Positive for arthralgias and joint swelling.   Allergic/Immunologic: Positive for environmental allergies and food allergies.   Neurological: Positive for weakness.   Psychiatric/Behavioral: The patient is nervous/anxious.    All other systems reviewed and are negative.        Patient Active Problem List   Diagnosis   • Abnormal liver enzymes   • Morbid obesity (CMS/HCC)   • Arthralgia of multiple joints   • Gastroesophageal reflux disease with esophagitis   • Mixed hyperlipidemia   • LUCERO on CPAP   • Allergic rhinitis due to pollen   • Asthma   • Benign essential  hypertension   • Hyperactivity of bladder   • Irritable bowel syndrome   • Lymphadenopathy, axillary   • Avulsed toenail   • Uncontrolled type 2 diabetes mellitus with peripheral neuropathy (CMS/HCC)   • Back pain   • Neck pain   • Cyst of skin   • Hematuria   • Mass of breast   • Spasm   • Proteinuria   • Herpes zoster   • Rash   • Atrial fibrillation with rapid ventricular response (CMS/HCC)   • Leukocytosis   • Chronic pain of right ankle   • Venous stasis   • Multinodular goiter (nontoxic)   • Bilateral primary osteoarthritis of knee   • Chronic anticoagulation       Past Medical History:   Diagnosis Date   • A-fib (CMS/HCC)    • Acute bronchitis with bronchospasm    • Acute maxillary sinusitis    • Acute suppurative otitis media of both ears without spontaneous rupture of tympanic membranes    • Allergic rhinitis due to pollen    • Arthritis    • Asthma    • Back pain, lumbosacral    • Cervicalgia    • Cyst of breast    • Diabetes mellitus (CMS/HCC)    • Esophagitis    • GERD (gastroesophageal reflux disease)    • H/O cystoscopy     diagnostic cystoscopy   • Heart murmur    • Hyperlipidemia    • Hypertension    • IBS (irritable bowel syndrome)    • Kidney infection    • Lump or mass in breast    • Muscle spasm    • Ovarian cancer (CMS/HCC)    • Overactive bladder    • Primary hyperparathyroidism (CMS/HCC) 6/11/2018   • Right knee pain    • Shingles    • Sleep apnea    • Thyroid nodule    • UTI (urinary tract infection)          Past Surgical History:   Procedure Laterality Date   • CARDIAC CATHETERIZATION     • COLONOSCOPY     • CYST REMOVAL     • CYSTOSCOPY     • THYROID SURGERY  09/22/2019    right   • TOTAL ABDOMINAL HYSTERECTOMY WITH SALPINGO OOPHORECTOMY     • TUMOR REMOVAL           Family History   Problem Relation Age of Onset   • Hypertension Mother    • Diabetes Mother         type 2   • Arthritis Mother    • Arthritis Father    • Hypertension Father    • Hyperlipidemia Father    • Diabetes Father          type 2   • Stroke Father    • Cancer Other    • Osteoporosis Maternal Grandmother          Social History     Socioeconomic History   • Marital status:      Spouse name: Not on file   • Number of children: Not on file   • Years of education: Not on file   • Highest education level: Not on file   Tobacco Use   • Smoking status: Never Smoker   • Smokeless tobacco: Never Used   Substance and Sexual Activity   • Alcohol use: No   • Drug use: No   • Sexual activity: Defer           Current Outpatient Medications:   •  apixaban (ELIQUIS) 5 MG tablet tablet, Take 1 tablet by mouth Every 12 (Twelve) Hours., Disp: 60 tablet, Rfl: 0  •  cetirizine (zyrTEC) 10 MG tablet, Take 1 tablet (10 mg total) by mouth Every Night, Disp: 90 tablet, Rfl: 3  •  diclofenac (VOLTAREN) 1 % gel gel, Apply 4 g topically to the appropriate area as directed 4 (Four) Times a Day As Needed (bursitis ankle)., Disp: 5 tube, Rfl: 5  •  diltiaZEM (TIAZAC) 360 MG 24 hr capsule, Take 360 mg by mouth Daily., Disp: , Rfl:   •  EPIPEN 2-DAVID 0.3 MG/0.3ML solution auto-injector injection, USE AS INSTRUCTED, Disp: 2 each, Rfl: 1  •  escitalopram (LEXAPRO) 10 MG tablet, Take 10 mg by mouth Daily., Disp: , Rfl: 2  •  glucose blood test strip, 1 each by Other route 3 (Three) Times a Day Use as instructed, Disp: 300 each, Rfl: 3  •  hydrOXYzine (ATARAX) 25 MG tablet, Take 1 tablet by mouth Every 6 (Six) Hours As Needed for Itching., Disp: 60 tablet, Rfl: 2  •  Insulin Pen Needle (PEN NEEDLES) 32G X 4 MM misc, 1 each 2 (Two) Times a Day Before Meals., Disp: 200 each, Rfl: 3  •  losartan (COZAAR) 100 MG tablet, Take 1 tablet (100 mg total) by mouth Daily, Disp: 90 tablet, Rfl: 3  •  metFORMIN ER (GLUCOPHAGE-XR) 500 MG 24 hr tablet, Take 1 tablet by mouth 2 (Two) Times a Day Before Meals., Disp: 180 tablet, Rfl: 3  •  NOVOLOG MIX 70/30 FLEXPEN (70-30) 100 UNIT/ML suspension pen-injector injection, 40 units before breakfast and 40 units before dinner, Disp:  "10 pen, Rfl: 5  •  omeprazole (priLOSEC) 20 MG capsule, TAKE ONE CAPSULE BY MOUTH ONCE DAILY, Disp: 90 capsule, Rfl: 1  •  pravastatin (PRAVACHOL) 20 MG tablet, Take 1 tablet by mouth Daily., Disp: 30 tablet, Rfl: 5  •  PREMARIN 0.625 MG/GM vaginal cream, , Disp: , Rfl:   •  tolterodine LA (DETROL LA) 4 MG 24 hr capsule, TAKE 1 CAPSULE BY MOUTH EVERY DAY, Disp: 90 capsule, Rfl: 1  •  triamterene-hydrochlorothiazide (MAXZIDE-25) 37.5-25 MG per tablet, Take 1 tablet by mouth Daily., Disp: 30 tablet, Rfl: 0  •  Vitamin D, Cholecalciferol, 1000 units tablet, Take 1,000 Units by mouth Daily., Disp: , Rfl:   •  Dulaglutide (TRULICITY) 0.75 MG/0.5ML solution pen-injector, Inject 0.75 mg under the skin into the appropriate area as directed 1 (One) Time Per Week., Disp: 2 pen, Rfl: 0  •  sulfamethoxazole-trimethoprim (BACTRIM DS,SEPTRA DS) 800-160 MG per tablet, , Disp: , Rfl:       Allergies   Allergen Reactions   • Fish Allergy Anaphylaxis, Itching, Nausea And Vomiting and Nausea Only   • Neomycin-Bacitracin Zn-Polymyx Hives   • Nuts Unknown (See Comments)     Patient was allergy tested, list in chart under media.   • Ciprofloxacin-Dexamethasone Hives   • Eggs Or Egg-Derived Products Rash   • Penicillins Palpitations         /85 (BP Location: Right arm, Patient Position: Sitting)   Pulse 68   Temp 97 °F (36.1 °C)   Resp 21   Ht 170.2 cm (67\")   Wt (!) 160 kg (352 lb)   SpO2 96%   BMI 55.13 kg/m²       Physical Exam:  Constitutional: Patient is oriented to person, place, and time.   HEENT: Head: Normocephalic and atraumatic.   Eyes: Conjunctivae and lids are normal.   Pupils: Equal, round, reactive to light.   Neck: Trachea normal. Neck supple. No JVD present.   Pulmonary Respiratory effort: No increased work of breathing or signs of respiratory distress. Auscultation of lungs: Clear to auscultation.   Cardiovascular Auscultation of heart: Normal rate and rhythm, normal S1 and S2, no murmurs.   Peripheral " vascular exam: Femoral: right 2+, left 2+. Posterior tibialis: right 2+ and left 2+. Dorsalis pedis: right 2+ and left 2+. 2+ edema.   Musculoskeletal   Gait and station: Gait evaluation demonstrated an antalgic gait   Lumbar spine: Passive and active range of motion are appropriate considering her body habitus. Extension, flexion, lateral flexion, rotation of the lumbar spine did not increase or reproduce pain. Lumbar facet joint loading maneuvers are negative.   Jose Guadalupe test and Gaenslen's test are negative   Palpation of the bilateral greater trochanter, unrevealing   Examination of the Iliotibial band: unrevealing   Hip joints: The range of motion of the hip joints is limited to flexion and internal rotation but without pain  Right knee: Decreased ROM -5 to 90 degrees. No ACL, PCL, LCL or MCL laxity. Tenderness found in the anterior joint line. MCL and LCL tenderness noted. Significant crepitus.  Left knee: Decreased ROM -5 to 100 degrees. No ACL, PCL, LCL or MCL laxity. Tenderness found in the anterior joint line. MCL and LCL tenderness noted. Significant crepitus.  Neurological:   Patient is alert and oriented to person, place, and time.   Speech: speech is normal.   Cortical function: Normal mental status.   Cranial nerves: Cranial nerves 2-12 intact.   Reflex Scores:  Right patellar: 1+  Left patellar: 1+  Right Achilles: 1+  Left Achilles: 1+  Motor strength: 5/5  Motor Tone: normal tone.   Involuntary movements: none.   Superficial/Primitive Reflexes: primitive reflexes were absent.   Right Mcneil: absent  Left Mcneil: absent  Right ankle clonus: absent  Left ankle clonus: absent   Babinsky: absent  Negative long tract signs. Straight leg raising test is negative. Femoral stretch sign is negative.   Sensation: No sensory loss. Sensory exam: intact to light touch, intact pain and temperature sensation, intact vibration sensation and normal proprioception.   Coordination: Normal finger to nose and heel to  shin. Normal balance and negative Romberg's sign   Skin and subcutaneous tissue: Skin is warm and intact. No rash noted. No cyanosis.   Psychiatric: Judgment and insight: Normal. Orientation to person, place and time: Normal. Recent and remote memory: Intact. Mood and affect: Normal.     ASSESSMENT:   1. Bilateral primary osteoarthritis of knee    2. Obstructive sleep apnea syndrome in adult    3. Uncontrolled type 2 diabetes mellitus with peripheral neuropathy (CMS/HCC)    4. Morbid obesity (CMS/HCC)    5. LUCERO on CPAP    6. Atrial fibrillation with rapid ventricular response (CMS/HCC)    7. Chronic anticoagulation        PLAN/MEDICAL DECISION MAKING: I had a lengthy conversation with Ms. Azalea Mendoza regarding her chronic pain condition and potential therapeutic options including risks, benefits, alternative therapies, to name a few. Patient has failed to obtain pain relief with conservative measures and previous interventional pain management measures, as referenced above.  Patient has been found not to be a candidate for total knee arthroplasty due to her BMI.  She has been referred in consultation for alternative measures for controlling her severe knee pain.  Her right knee pain is worse than the left knee.  Patient has failed to obtain relief with previous steroid injections, and more recently with Euflexxa.  Patient appears to be an appropriate candidate for potential regenerative therapies. Unfortunately, they are not covered by insurance. I have reviewed all available patient's medical records as well as previous therapies as referenced above.  Therefore, I have proposed the following plan:  1. Pharmacological measures: Reviewed. Discussed.   A. Patient takes Tylenol and Voltaren gel. Patient also takes hydroxyzine, Lexapro  B. Trial with Rheumate one tablet twice daily (samples)  C.  I have discussed with the patient the possibility of a trial with a low-dose of Cymbalta for treatment of her  musculoskeletal pain.  Patient will talk to Dr. Mickie Carrero.  Apparently, she has tried this medication in the past.  D. Trial with prilocaine 2%, lidocaine 10%, imipramine 3%, capsaicin 0.001% and mannitol 20%  cream, apply 1 to 2 grams of cream to the affected areas every 4 to 6 hours as needed  2. Interventional pain management measures: Patient will be scheduled for diagnostic right superior medial genicular nerve block, right superior lateral genicular nerve block and right inferior medial genicular nerve block. If patient experiences more than 50% pain relief along with significant improvement in the range of motion of the knee joint, then, patient will be scheduled for a second set of diagnostic right superior genicular nerve blocks, to then proceed with bipolar radiofrequency ablation of the right superomedial, superolateral and inferomedial genicular nerves.  We may request clearance to stop Eliquis for her radiofrequency ablation.  If patient experiences significant relief, then, we may proceed with a similar approach for treatment of her chronic left knee pain.   3. Long-term rehabilitation efforts:  A. Patient will start a comprehensive physical therapy program for reconditioning, water therapy, therapeutic exercise, core strengthening, gait and balance training, myofascial release, cupping and dry needling once pain is under control  B. Start an exercise program such as water therapy  C. Contrast therapy: Apply ice-packs for 15-20 minutes, followed by heating pads for 15-20 minutes to affected area   D. Referral to Dr. Jackson Carrero for psychological evaluation, cognitive behavioral therapy, biofeedback and assistance in the development of effective coping skills.  E. Referral to [facility]  4. The patient has been instructed to contact my office with any questions or difficulties. The patient understands the plan and agrees to proceed accordingly.           Patient  Care Team:  Nallely Frazier MD as PCP - General (General Practice)  Kang Polk MD as Consulting Physician (General Surgery)  Mickie Carrero MD as Consulting Physician (Rheumatology)  Vahid Chau MD as Consulting Physician (Cardiology)  Skylar Beckett MD as Consulting Physician (Endocrinology)  Rafy Schneider MD as Consulting Physician (Pulmonary Disease)  Zain Verdin MD as Consulting Physician (Orthopedic Surgery)  Romina Whittington PA as Physician Assistant (Bariatrics)  Shantel Blount APRN as Nurse Practitioner (Pulmonary Disease)     No orders of the defined types were placed in this encounter.        Future Appointments   Date Time Provider Department Center   12/12/2019  8:45 AM Romina Whittington PA [facility] None   12/20/2019  8:00 AM [facility] 1 [facility]   1/8/2020 10:30 AM Shantel Blount APRN [facility] None   1/20/2020 11:45 AM Skylar Beckett MD [facility] None   4/22/2020 11:20 AM Zain Verdin MD [facility] None         Joey Rosas MD     EMR Dragon/Transcription disclaimer:  Much of this encounter note is an electronic transcription of spoken language to printed text. Electronic transcription of spoken language may permit erroneous, or at times, nonsensical words or phrases to be inadvertently transcribed. Although I have reviewed the note for such errors, some may still exist.

## 2019-12-10 ENCOUNTER — APPOINTMENT (OUTPATIENT)
Dept: NEUROLOGY | Facility: HOSPITAL | Age: 49
End: 2019-12-10

## 2019-12-10 ENCOUNTER — OFFICE VISIT (OUTPATIENT)
Dept: PAIN MEDICINE | Facility: CLINIC | Age: 49
End: 2019-12-10

## 2019-12-10 VITALS
BODY MASS INDEX: 45.99 KG/M2 | SYSTOLIC BLOOD PRESSURE: 140 MMHG | RESPIRATION RATE: 21 BRPM | DIASTOLIC BLOOD PRESSURE: 85 MMHG | HEIGHT: 67 IN | OXYGEN SATURATION: 96 % | TEMPERATURE: 97 F | HEART RATE: 68 BPM | WEIGHT: 293 LBS

## 2019-12-10 DIAGNOSIS — G47.33 OSA ON CPAP: ICD-10-CM

## 2019-12-10 DIAGNOSIS — E66.01 MORBID OBESITY (HCC): ICD-10-CM

## 2019-12-10 DIAGNOSIS — Z00.8 PRE-SURGICAL PSYCHOLOGICAL ASSESSMENT, ENCOUNTER FOR: ICD-10-CM

## 2019-12-10 DIAGNOSIS — IMO0002 UNCONTROLLED TYPE 2 DIABETES MELLITUS WITH PERIPHERAL NEUROPATHY: ICD-10-CM

## 2019-12-10 DIAGNOSIS — G47.33 OBSTRUCTIVE SLEEP APNEA SYNDROME IN ADULT: ICD-10-CM

## 2019-12-10 DIAGNOSIS — Z99.89 OSA ON CPAP: ICD-10-CM

## 2019-12-10 DIAGNOSIS — M17.0 BILATERAL PRIMARY OSTEOARTHRITIS OF KNEE: ICD-10-CM

## 2019-12-10 DIAGNOSIS — I48.91 ATRIAL FIBRILLATION WITH RAPID VENTRICULAR RESPONSE (HCC): ICD-10-CM

## 2019-12-10 DIAGNOSIS — M17.0 BILATERAL PRIMARY OSTEOARTHRITIS OF KNEE: Primary | ICD-10-CM

## 2019-12-10 DIAGNOSIS — Z79.01 CHRONIC ANTICOAGULATION: ICD-10-CM

## 2019-12-10 PROCEDURE — 99204 OFFICE O/P NEW MOD 45 MIN: CPT | Performed by: ANESTHESIOLOGY

## 2019-12-10 RX ORDER — ME-TETRAHYDROFOLATE/B12/HRB236 1-1-500 MG
CAPSULE ORAL
Qty: 180 CAPSULE | Refills: 1 | Status: SHIPPED | OUTPATIENT
Start: 2019-12-10 | End: 2019-12-12

## 2019-12-11 DIAGNOSIS — IMO0002 UNCONTROLLED TYPE 2 DIABETES MELLITUS WITH PERIPHERAL NEUROPATHY: Primary | ICD-10-CM

## 2019-12-11 RX ORDER — PROCHLORPERAZINE 25 MG/1
1 SUPPOSITORY RECTAL
Qty: 1 EACH | Refills: 4 | Status: SHIPPED | OUTPATIENT
Start: 2019-12-11

## 2019-12-11 RX ORDER — PROCHLORPERAZINE 25 MG/1
SUPPOSITORY RECTAL
Qty: 3 EACH | Refills: 11 | Status: SHIPPED | OUTPATIENT
Start: 2019-12-11

## 2019-12-12 ENCOUNTER — DOCUMENTATION (OUTPATIENT)
Dept: BARIATRICS/WEIGHT MGMT | Facility: CLINIC | Age: 49
End: 2019-12-12

## 2019-12-12 ENCOUNTER — OFFICE VISIT (OUTPATIENT)
Dept: BARIATRICS/WEIGHT MGMT | Facility: CLINIC | Age: 49
End: 2019-12-12

## 2019-12-12 VITALS
SYSTOLIC BLOOD PRESSURE: 128 MMHG | HEIGHT: 67 IN | BODY MASS INDEX: 45.99 KG/M2 | DIASTOLIC BLOOD PRESSURE: 82 MMHG | WEIGHT: 293 LBS | HEART RATE: 85 BPM | TEMPERATURE: 100.1 F | OXYGEN SATURATION: 99 % | RESPIRATION RATE: 18 BRPM

## 2019-12-12 DIAGNOSIS — Z99.89 OSA ON CPAP: ICD-10-CM

## 2019-12-12 DIAGNOSIS — E66.01 MORBID OBESITY WITH BMI OF 50.0-59.9, ADULT (HCC): ICD-10-CM

## 2019-12-12 DIAGNOSIS — G47.33 OSA ON CPAP: ICD-10-CM

## 2019-12-12 DIAGNOSIS — R10.13 DYSPEPSIA: ICD-10-CM

## 2019-12-12 DIAGNOSIS — I10 BENIGN ESSENTIAL HYPERTENSION: ICD-10-CM

## 2019-12-12 DIAGNOSIS — I48.91 ATRIAL FIBRILLATION WITH RAPID VENTRICULAR RESPONSE (HCC): ICD-10-CM

## 2019-12-12 DIAGNOSIS — IMO0002 UNCONTROLLED TYPE 2 DIABETES MELLITUS WITH PERIPHERAL NEUROPATHY: ICD-10-CM

## 2019-12-12 DIAGNOSIS — K21.9 GASTROESOPHAGEAL REFLUX DISEASE, ESOPHAGITIS PRESENCE NOT SPECIFIED: Primary | ICD-10-CM

## 2019-12-12 PROCEDURE — 99215 OFFICE O/P EST HI 40 MIN: CPT | Performed by: PHYSICIAN ASSISTANT

## 2019-12-12 RX ORDER — SODIUM CHLORIDE 9 MG/ML
150 INJECTION, SOLUTION INTRAVENOUS CONTINUOUS
Status: CANCELLED | OUTPATIENT
Start: 2019-12-12

## 2019-12-12 NOTE — PROGRESS NOTES
"[facility] BARIATRIC SURGERY  [address]  [facility] [id number] [phone]      Patient  Name:  Azalea Mendoza  :  1970      Date of Visit: 2019      Chief Complaint:  weight gain; unable to maintain weight loss    History of Present Illness:  Azalea Mendoza is a 49 y.o. female who presents today for evaluation, education and consultation regarding weight loss surgery. The patient is interested in sleeve gastrectomy with Dr. Hartman.     \"I'm 50 y/o but I feel like I'm 71\" - \"bad health, bad knees, can't continue like this\".  Current weight 349 lbs.  Maximum lifetime weight is 375 pounds.  Most successful weight loss attempt was w/ [facility], lost 113 lbs, but was unable to maintain.      Azalea has been overweight for at least 35 years, has been 35 pounds or more overweight for at least 30 years, has been 100 pounds or more overweight for 25 or more years and started dieting at age 13.  Previous diet attempts include: Low Carbohydrate, Low Fat and Calorie Counting; Weight Watchers.      As above, patient has been overweight for many years, with numerous failed dietary/weight loss attempts.  She has numerous obesity related comorbidities and as such has decided to pursue weight loss surgery.  All past medical, surgical, social and family history have been obtained and discussed today, as pertinent to bariatric surgery.     Note:  Hx IBS, exacerbated w/ salads and/or greasy foods, now also having postprandial RUQ pain.  Known hx gallstones.  Denies N/V.  Reflux controlled w/ OTC Prilosec.  Denies GI eval.  No recent EGD.  No prior GES.     Past Medical History:   Diagnosis Date   • A-fib (CMS/HCC)     follows [facility]/ Dr. Chau, on Eliquis   • Allergic rhinitis due to pollen    • Anxiety    • Asthma     mild, prn inhalers only, follows [facility]/ Dr. Schneider    • Back pain, lumbosacral    • Cervicalgia    • Diabetic neuropathy (CMS/HCC)    • DM2 (diabetes mellitus, type 2) " "(CMS/HCC)     dx 2005, on insulin 2+years, follows w/ Endocrinology, A1C >9   • Dyslipidemia    • Dyspepsia    • Dyspnea on exertion    • Fatigue    • Gallstones    • GERD (gastroesophageal reflux disease)     daily Omeprazole 20mg, controlled, denies recent EGD    • H/O cystoscopy     diagnostic cystoscopy   • Heart murmur    • Hyperparathyroidism (CMS/HCC)     s/p (R) parathyroidectomy 9/2019 @[facility]   • Hypertension    • IBS (irritable bowel syndrome)     exacerbated w/ salads and/or greasy foods, does not follow w/ GI   • Lump or mass in breast     last mammogram 1/2019 OK   • Lung nodule     RUL, 8mm, repeat Chest CT planned 12/2019   • Morbid obesity with BMI of 50.0-59.9, adult (CMS/HCC)    • Muscle spasm    • Osteoarthritis     takes prn Tylenol, denies NSAIDS/steroids   • Ovarian cancer (CMS/HCC)     dx 2000, s/p TAHBSO, no chemo/XRT   • Overactive bladder     on Detrol   • Primary hyperparathyroidism (CMS/HCC) 6/11/2018   • Right knee pain     pursuing \"nerve block\" w/ pain management, avoids NSAIDS/steroids   • Shingles    • Sleep apnea     CPAP compliant   • Thyroid nodule     follows w/ Dr. Yin @, conservative management     Past Surgical History:   Procedure Laterality Date   • COLONOSCOPY  2013   • HX OVARIAN CYSTECTOMY  1997    (open) lower transverse incision   • LAPAROSCOPIC OVARIAN CYSTECTOMY  2000    w/ CA dx   • PARATHYROIDECTOMY  09/22/2019    (R) parathyroidectomy w/ Dr. Yin @    • TOTAL ABDOMINAL HYSTERECTOMY WITH SALPINGO OOPHORECTOMY  09/2000    ovarian CA       Allergies   Allergen Reactions   • Fish Allergy Anaphylaxis, Itching and Nausea And Vomiting   • Shellfish Allergy Anaphylaxis   • Nuts Other (See Comments)     Based on allergy testing, avoids pecans/walnuts, but tolerates peanuts/PB w/out issue   • Ciprofloxacin-Dexamethasone Rash     Otic drops caused rash around the ears, but tolerates Cipro PO w/out issue   • Eggs Or Egg-Derived Products Other (See Comments)     Based on food " allergy testing, but tolerates them w/out issue   • Neomycin-Bacitracin Zn-Polymyx Rash   • Penicillins Palpitations     Keflex OK        Current Outpatient Medications:   •  apixaban (ELIQUIS) 5 MG tablet tablet, Take 1 tablet by mouth Every 12 (Twelve) Hours., Disp: 60 tablet, Rfl: 0  •  cetirizine (zyrTEC) 10 MG tablet, Take 1 tablet (10 mg total) by mouth Every Night, Disp: 90 tablet, Rfl: 3  •  Continuous Blood Gluc Sensor (DEXCOM G6 SENSOR), Every 10 (Ten) Days., Disp: 3 each, Rfl: 11  •  Continuous Blood Gluc Transmit (DEXCOM G6 TRANSMITTER) misc, 1 each Every 3 (Three) Months., Disp: 1 each, Rfl: 4  •  diclofenac (VOLTAREN) 1 % gel gel, Apply 4 g topically to the appropriate area as directed 4 (Four) Times a Day As Needed (bursitis ankle)., Disp: 5 tube, Rfl: 5  •  diltiaZEM (TIAZAC) 360 MG 24 hr capsule, Take 360 mg by mouth Daily., Disp: , Rfl:   •  escitalopram (LEXAPRO) 10 MG tablet, Take 10 mg by mouth Daily., Disp: , Rfl: 2  •  glucose blood test strip, 1 each by Other route 3 (Three) Times a Day Use as instructed, Disp: 300 each, Rfl: 3  •  Insulin Pen Needle (PEN NEEDLES) 32G X 4 MM misc, 1 each 2 (Two) Times a Day Before Meals., Disp: 200 each, Rfl: 3  •  losartan (COZAAR) 100 MG tablet, Take 1 tablet (100 mg total) by mouth Daily, Disp: 90 tablet, Rfl: 3  •  metFORMIN ER (GLUCOPHAGE-XR) 500 MG 24 hr tablet, Take 1 tablet by mouth 2 (Two) Times a Day Before Meals., Disp: 180 tablet, Rfl: 3  •  NOVOLOG MIX 70/30 FLEXPEN (70-30) 100 UNIT/ML suspension pen-injector injection, 40 units before breakfast and 40 units before dinner, Disp: 10 pen, Rfl: 5  •  omeprazole (priLOSEC) 20 MG capsule, TAKE ONE CAPSULE BY MOUTH ONCE DAILY, Disp: 90 capsule, Rfl: 1  •  PREMARIN 0.625 MG/GM vaginal cream, , Disp: , Rfl:   •  tolterodine LA (DETROL LA) 4 MG 24 hr capsule, TAKE 1 CAPSULE BY MOUTH EVERY DAY, Disp: 90 capsule, Rfl: 1  •  triamterene-hydrochlorothiazide (MAXZIDE-25) 37.5-25 MG per tablet, Take 1 tablet by  mouth Daily., Disp: 30 tablet, Rfl: 0  •  Vitamin D, Cholecalciferol, 1000 units tablet, Take 1,000 Units by mouth Daily., Disp: , Rfl:   •  EPIPEN 2-DAVID 0.3 MG/0.3ML solution auto-injector injection, USE AS INSTRUCTED, Disp: 2 each, Rfl: 1  •  Gel Base gel, PRILOCAINE 2% LIDOCAINE 10% IMIPRAMINE 3% CAPSAICIN 0.001% MANNITOL 20%. APPLY 1-2 G TO AFFECTED AREA 3-4 TIMES DAILY, Disp: 240 g, Rfl: PRN  •  pravastatin (PRAVACHOL) 20 MG tablet, Take 1 tablet by mouth Daily., Disp: 30 tablet, Rfl: 5    Social History     Socioeconomic History   • Marital status:      Spouse name: Not on file   • Number of children: Not on file   • Years of education: Not on file   • Highest education level: Not on file   Tobacco Use   • Smoking status: Never Smoker   • Smokeless tobacco: Never Used   Substance and Sexual Activity   • Alcohol use: No   • Drug use: No   • Sexual activity: Defer   Social History Narrative    Living in [place] w/ .  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       Family History   Problem Relation Age of Onset   • Hypertension Mother    • Arthritis Mother    • Arthritis Father    • Hypertension Father    • Hyperlipidemia Father    • Diabetes Father         type 2   • Stroke Father    • Obesity Father    • Cervical cancer Maternal Grandmother    • Heart attack Maternal Grandfather    • Leukemia Paternal Grandmother    • Alzheimer's disease Paternal Grandfather        Review of Systems:  Constitutional:  reports fatigue, weight gain and denies fevers, chills.  HEENT:  denies headache, ear pain or loss of hearing, blurred or double vision, nasal discharge or sore throat.  Cardiovascular:  reports HTN, HLD, Atrial Fib, hx heart disease and denies chest pain, palpitations, hx DVT.  Respiratory:  reports sleep apnea, asthma and denies cough , wheezing, hx PE.  Gastrointestinal:  reports heartburn, IBS, gallbladder issues and denies dysphagia, nausea, vomiting.  Genitourinary:  reports incontinence and denies  hematuria, dysuria, polyuria, renal insufficiency.    Musculoskeletal:  reports joint pain, back pain, arthritis and denies fibromyalgia and autoimmune disease.  Neurological:  denies migraines, dizziness, confusion, seizure.  Psychiatric:  reports anxiety and denies depression, bipolar disorder.  Endocrine:  reports diabetes, thyroid disease and denies gout.  Hematologic: denies anemia, bleeding disorder, hx blood transfusion.  Skin: denies hx MRSA.    Physical Exam:  Vital Signs:  Weight: (!) 159 kg (349 lb 8 oz)   Body mass index is 55.57 kg/m².  Temp: 100.1 °F (37.8 °C)   Heart Rate: 85   BP: 128/82     Physical Exam   Constitutional: She is oriented to person, place, and time. She appears well-developed and well-nourished.   HENT:   Head: Normocephalic and atraumatic.   Eyes: Conjunctivae are normal. No scleral icterus.   Neck: Neck supple. No thyromegaly present.   Cardiovascular: Normal rate and regular rhythm.   No murmur heard.  Pulmonary/Chest: Effort normal and breath sounds normal. No respiratory distress. She has no wheezes. She has no rales.   Abdominal: Soft. Bowel sounds are normal. She exhibits no distension and no mass. There is no tenderness. No hernia.   scars: lower midline, low transverse   Musculoskeletal: Normal range of motion. She exhibits no edema.   Neurological: She is alert and oriented to person, place, and time. Gait normal.   Skin: Skin is warm and dry. No rash noted.   Psychiatric: She has a normal mood and affect. Judgment normal.   Vitals reviewed.      Patient Active Problem List   Diagnosis   • Abnormal liver enzymes   • Morbid obesity (CMS/HCC)   • Arthralgia of multiple joints   • Mixed hyperlipidemia   • LUCERO on CPAP   • Allergic rhinitis due to pollen   • Asthma   • Benign essential hypertension   • Hyperactivity of bladder   • Lymphadenopathy, axillary   • Avulsed toenail   • Uncontrolled type 2 diabetes mellitus with peripheral neuropathy (CMS/HCC)   • Back pain   • Neck  pain   • Hematuria   • Mass of breast   • Proteinuria   • Herpes zoster   • Atrial fibrillation with rapid ventricular response (CMS/HCC)   • Leukocytosis   • Chronic pain of right ankle   • Venous stasis   • Multinodular goiter (nontoxic)   • Bilateral primary osteoarthritis of knee   • Chronic anticoagulation   • Overactive bladder   • GERD (gastroesophageal reflux disease)   • Diabetic neuropathy (CMS/HCC)   • Ovarian cancer (CMS/HCC)   • Lump or mass in breast   • IBS (irritable bowel syndrome)   • Lung nodule   • Dyslipidemia   • Heart murmur   • Osteoarthritis   • Right knee pain   • Fatigue   • Dyspepsia   • Dyspnea on exertion   • Hyperparathyroidism (CMS/HCC)   • Morbid obesity with BMI of 50.0-59.9, adult (CMS/HCC)   • Anxiety   • Gallstones       Assessment:    Azalea Mendoza is a 49 y.o. female with medically complicated obesity pursuing sleeve gastrectomy.    Weight loss surgery is deemed medically necessary given the following obesity related comorbidities including sleep apnea, diabetes, hypertension, dyslipidemia, cardiovascular disease, osteoarthritis, back pain, knee pain, GERD and asthma with current Weight: (!) 159 kg (349 lb 8 oz) and Body mass index is 55.57 kg/m².    Plan:  Patient understands that bariatric surgery is not cosmetic surgery but rather a tool to help make a lifelong commitment to lifestyle changes including diet, exercise, behavior modifications, and healthy habits.  The patient has been educated today on those expected postoperative lifestyle changes.  The surgical procedure was discussed and all questions were answered.  Instructions on how to access [employer] (an internet based site w/ educational surgical videos) were given to the patient.  Recommended vitamin supplementation was reviewed.  The importance of avoiding ASA/ NSAIDS/ steroids/ tobacco/ hormones/ immunomodulators perioperatively was discussed in detail.  Psychological and Nutritional evaluations will be arranged  prior to surgery.  The patient was advised to start on a high protein and low carbohydrate diet in preparation for surgery.      Due to hx insulin-dependent diabetes, patient has been advised to check blood sugars more frequently while on this perioperative diet, and follow closely with managing provider to adjust insulin dosing if required.  Statement from PCP re: perioperative insulin management/adjustments will be required prior to surgery as well.    Preop testing will include: CBC, CMP, Lipids, TSH, HgA1C, H.Pylori stool, Pulmonary Function Testing, CXR, EKG, GES and EGD.    The risks and benefits of the upper endoscopy were discussed with the patient in detail and all questions were answered.  Possibility of perforation, bleeding, aspiration, and anesthesia reaction were reviewed.  Patient agrees to proceed.    Will obtain prior gallbladder studies, as well as upcoming Chest CT for our review.      Additional preop clearances required prior to surgery: Cardiac, Endocrinology and Pulmonary.      Patient is an acceptable candidate for surgery pending the above results and final consultation w/ Dr. Hartman.    TRUNG Thomas        MDM: New problem w/ further workup planned.  Labs, imaging, additional testing planned, old records (op notes) obtained /reviewed, all to be discussed further w/ surgeon.  HIGH complexity.

## 2019-12-12 NOTE — PROGRESS NOTES
"Weight Loss Surgery  Presurgical Nutrition Assessment     Azalea Mendoza  12/12/2019  [id number]  [id number]  1970  female    Surgery desired: Sleeve Gastrectomy    Ht 168.9 cm (66.5\"); Wt 159 kg (66.5\"); BMI 55.6  Past Medical History:   Diagnosis Date   • A-fib (CMS/[facility])     follows w/ Dr. Chau, on Eliquis   • Allergic rhinitis due to pollen    • Anxiety    • Asthma     mild, prn inhalers only, follows w/ Dr. Schneider    • Back pain, lumbosacral    • Cervicalgia    • Diabetic neuropathy (CMS/[facility])    • DM2 (diabetes mellitus, type 2) (CMS/HCC)     dx 2005, on insulin 2+years, follows w/ Endocrinology, A1C >9   • Dyslipidemia    • Dyspepsia    • Dyspnea on exertion    • Fatigue    • Gallstones    • GERD (gastroesophageal reflux disease)     daily Omeprazole 20mg, controlled, denies recent EGD    • H/O cystoscopy     diagnostic cystoscopy   • Heart murmur    • Hyperparathyroidism (CMS/HCC)     s/p (R) parathyroidectomy 9/2019 @   • Hypertension    • IBS (irritable bowel syndrome)     exacerbated w/ salads and/or greasy foods, does not follow w/ GI   • Lump or mass in breast     last mammogram 1/2019 OK   • Lung nodule     RUL, 8mm, repeat Chest CT planned 12/2019   • Morbid obesity with BMI of 50.0-59.9, adult (CMS/HCC)    • Muscle spasm    • Osteoarthritis     takes prn Tylenol, denies NSAIDS/steroids   • Ovarian cancer (CMS/HCC)     dx 2000, s/p TAHBSO, no chemo/XRT   • Overactive bladder     on Detrol   • Primary hyperparathyroidism (CMS/[facility]) 6/11/2018   • Right knee pain     pursuing \"nerve block\" w/ pain management, avoids NSAIDS/steroids   • Shingles    • Sleep apnea     CPAP compliant   • Thyroid nodule     follows w/ Dr. Yin @, conservative management     Past Surgical History:   Procedure Laterality Date   • COLONOSCOPY  2013   • HX OVARIAN CYSTECTOMY  1997    (open) lower transverse incision   • LAPAROSCOPIC OVARIAN CYSTECTOMY  2000    w/ CA dx   • PARATHYROIDECTOMY  09/22/2019    (R) " parathyroidectomy w/ Dr. Yin @    • TOTAL ABDOMINAL HYSTERECTOMY WITH SALPINGO OOPHORECTOMY  09/2000    ovarian CA     Allergies   Allergen Reactions   • Fish Allergy Anaphylaxis, Itching and Nausea And Vomiting   • Shellfish Allergy Anaphylaxis   • Nuts Other (See Comments)     Based on allergy testing, avoids pecans/walnuts, but tolerates peanuts/PB w/out issue   • Ciprofloxacin-Dexamethasone Rash     Otic drops caused rash around the ears, but tolerates Cipro PO w/out issue   • Eggs Or Egg-Derived Products Other (See Comments)     Based on food allergy testing, but tolerates them w/out issue   • Neomycin-Bacitracin Zn-Polymyx Rash   • Penicillins Palpitations     Keflex OK        Current Outpatient Medications:   •  apixaban (ELIQUIS) 5 MG tablet tablet, Take 1 tablet by mouth Every 12 (Twelve) Hours., Disp: 60 tablet, Rfl: 0  •  cetirizine (zyrTEC) 10 MG tablet, Take 1 tablet (10 mg total) by mouth Every Night, Disp: 90 tablet, Rfl: 3  •  Continuous Blood Gluc Sensor (DEXCOM G6 SENSOR), Every 10 (Ten) Days., Disp: 3 each, Rfl: 11  •  Continuous Blood Gluc Transmit (DEXCOM G6 TRANSMITTER) misc, 1 each Every 3 (Three) Months., Disp: 1 each, Rfl: 4  •  diclofenac (VOLTAREN) 1 % gel gel, Apply 4 g topically to the appropriate area as directed 4 (Four) Times a Day As Needed (bursitis ankle)., Disp: 5 tube, Rfl: 5  •  diltiaZEM (TIAZAC) 360 MG 24 hr capsule, Take 360 mg by mouth Daily., Disp: , Rfl:   •  EPIPEN 2-DAVID 0.3 MG/0.3ML solution auto-injector injection, USE AS INSTRUCTED, Disp: 2 each, Rfl: 1  •  escitalopram (LEXAPRO) 10 MG tablet, Take 10 mg by mouth Daily., Disp: , Rfl: 2  •  Gel Base gel, PRILOCAINE 2% LIDOCAINE 10% IMIPRAMINE 3% CAPSAICIN 0.001% MANNITOL 20%. APPLY 1-2 G TO AFFECTED AREA 3-4 TIMES DAILY, Disp: 240 g, Rfl: PRN  •  glucose blood test strip, 1 each by Other route 3 (Three) Times a Day Use as instructed, Disp: 300 each, Rfl: 3  •  Insulin Pen Needle (PEN NEEDLES) 32G X 4 MM misc, 1 each 2  "(Two) Times a Day Before Meals., Disp: 200 each, Rfl: 3  •  losartan (COZAAR) 100 MG tablet, Take 1 tablet (100 mg total) by mouth Daily, Disp: 90 tablet, Rfl: 3  •  metFORMIN ER (GLUCOPHAGE-XR) 500 MG 24 hr tablet, Take 1 tablet by mouth 2 (Two) Times a Day Before Meals., Disp: 180 tablet, Rfl: 3  •  NOVOLOG MIX 70/30 FLEXPEN (70-30) 100 UNIT/ML suspension pen-injector injection, 40 units before breakfast and 40 units before dinner, Disp: 10 pen, Rfl: 5  •  omeprazole (priLOSEC) 20 MG capsule, TAKE ONE CAPSULE BY MOUTH ONCE DAILY, Disp: 90 capsule, Rfl: 1  •  pravastatin (PRAVACHOL) 20 MG tablet, Take 1 tablet by mouth Daily., Disp: 30 tablet, Rfl: 5  •  PREMARIN 0.625 MG/GM vaginal cream, , Disp: , Rfl:   •  tolterodine LA (DETROL LA) 4 MG 24 hr capsule, TAKE 1 CAPSULE BY MOUTH EVERY DAY, Disp: 90 capsule, Rfl: 1  •  triamterene-hydrochlorothiazide (MAXZIDE-25) 37.5-25 MG per tablet, Take 1 tablet by mouth Daily., Disp: 30 tablet, Rfl: 0  •  Vitamin D, Cholecalciferol, 1000 units tablet, Take 1,000 Units by mouth Daily., Disp: , Rfl:       Nutrition Assessment    Estimated energy needs:  2155 kcal    Estimated calories for weight loss:  1600 kcal    IBW (Pounds):  155 #        Excess body weight (Pounds):  195 #       Nutrition Recall  24 Hour recall: (B) (L) (D) -  Reviewed and discussed with patient.  Diet Mt Dew & Diet Coke are beverages of choice.  Pt is allergic to eggs, fish & nuts (has epinephrine on hand, she states). She does eat eggs at times in spite of this dx.  Brkfast @ 9 am = 2 lrg eggs /c 2 biscuits & a 4 oz sausage jose eduardo /c 20 oz Mt Dew.  Lunch @ 12:45 = [employer] [place] dip sandwich /c 2 potato cakes & 32 oz fountain diet Coke.  3:30 pm snack = 2 slices Am Cheese & 16 club crackers.  Dinner @ 5:30 pm = 3 slices of [employer] lrg pizza & a 20 oz diet Mt Dew.  Marginal protein intake (~ 85 g )/c an excessive amt of processed food from [place]\"Pt to focus on ingesting adeq protein for wt mgt in 3 meals + " 2-3 high prot snks qd.  Also to wean self off of diet soda.         Exercise  never      Education    Provided information packet re:  Sleeve Gastrectomy  1. Reviewed guidelines for higher protein, limited carbohydrate diet to promote weight loss.  Encouraged patient to incorporate these principles of healthy eating from now until approximately 2 weeks prior to bariatric surgery date, when an even lower carbohydrate “liver-shrinking” regimen will be followed. (Information sheet re pre-op diet given).  Explained that after recovery from surgery this diet will again be followed to ensure further loss and for weight maintenance.    2. Encouraged patient to choose an acceptable protein supplement powder or shake for post-surgery liquid diet.  Provided product guidelines and examples.    3. Explained importance of goal setting to help in changing eating behaviors that are not conducive to weight loss.  Targeted several on a worksheet which also included spaces for patient to work on issues specific to them.  4. Provided follow-up options for support, including contact information for dietitians here, if desired.  Web-based support information and apps for smart phones and computers given.  Noted that monthly support group is offered at this clinic, and that support is associated with successful weight loss.    Recommend that team proceed with surgery and follow per protocol.      Nutrition Goals   Dietary Guidelines per information packet as described above  Protein goal:  grams per day   Carbohydrate goal:  100-140 grams per day  Eliminate soda, sweet tea, etc.     Exercise Goals  Continue current exercise routine   Add 15-30 minutes of activity per day as tolerated      Holly Rizzo, KIRIT  12/12/2019  3:24 PM

## 2019-12-13 PROBLEM — K21.9 GASTROESOPHAGEAL REFLUX DISEASE: Status: ACTIVE | Noted: 2019-12-13

## 2019-12-13 LAB
ALBUMIN SERPL-MCNC: 4.6 G/DL (ref 3.5–5.5)
ALBUMIN/GLOB SERPL: 1.7 {RATIO} (ref 1.2–2.2)
ALP SERPL-CCNC: 104 IU/L (ref 39–117)
ALT SERPL-CCNC: 10 IU/L (ref 0–32)
AST SERPL-CCNC: 13 IU/L (ref 0–40)
BASOPHILS # BLD AUTO: 0 X10E3/UL (ref 0–0.2)
BASOPHILS NFR BLD AUTO: 0 %
BILIRUB SERPL-MCNC: 0.4 MG/DL (ref 0–1.2)
BUN SERPL-MCNC: 13 MG/DL (ref 6–24)
BUN/CREAT SERPL: 19 (ref 9–23)
CALCIUM SERPL-MCNC: 9.5 MG/DL (ref 8.7–10.2)
CHLORIDE SERPL-SCNC: 102 MMOL/L (ref 96–106)
CHOLEST SERPL-MCNC: 206 MG/DL (ref 100–199)
CO2 SERPL-SCNC: 23 MMOL/L (ref 20–29)
CREAT SERPL-MCNC: 0.67 MG/DL (ref 0.57–1)
EOSINOPHIL # BLD AUTO: 0.5 X10E3/UL (ref 0–0.4)
EOSINOPHIL NFR BLD AUTO: 4 %
ERYTHROCYTE [DISTWIDTH] IN BLOOD BY AUTOMATED COUNT: 13.3 % (ref 12.3–15.4)
GLOBULIN SER CALC-MCNC: 2.7 G/DL (ref 1.5–4.5)
GLUCOSE SERPL-MCNC: 145 MG/DL (ref 65–99)
HBA1C MFR BLD: 8.8 % (ref 4.8–5.6)
HCT VFR BLD AUTO: 42.7 % (ref 34–46.6)
HDLC SERPL-MCNC: 48 MG/DL
HGB BLD-MCNC: 14.2 G/DL (ref 11.1–15.9)
IMM GRANULOCYTES # BLD AUTO: 0.1 X10E3/UL (ref 0–0.1)
IMM GRANULOCYTES NFR BLD AUTO: 1 %
LDLC SERPL CALC-MCNC: 133 MG/DL (ref 0–99)
LYMPHOCYTES # BLD AUTO: 3.8 X10E3/UL (ref 0.7–3.1)
LYMPHOCYTES NFR BLD AUTO: 28 %
MCH RBC QN AUTO: 27.9 PG (ref 26.6–33)
MCHC RBC AUTO-ENTMCNC: 33.3 G/DL (ref 31.5–35.7)
MCV RBC AUTO: 84 FL (ref 79–97)
MONOCYTES # BLD AUTO: 0.6 X10E3/UL (ref 0.1–0.9)
MONOCYTES NFR BLD AUTO: 4 %
NEUTROPHILS # BLD AUTO: 8.6 X10E3/UL (ref 1.4–7)
NEUTROPHILS NFR BLD AUTO: 63 %
PLATELET # BLD AUTO: 414 X10E3/UL (ref 150–450)
POTASSIUM SERPL-SCNC: 4.5 MMOL/L (ref 3.5–5.2)
PROT SERPL-MCNC: 7.3 G/DL (ref 6–8.5)
RBC # BLD AUTO: 5.09 X10E6/UL (ref 3.77–5.28)
SODIUM SERPL-SCNC: 142 MMOL/L (ref 134–144)
TRIGL SERPL-MCNC: 126 MG/DL (ref 0–149)
TSH SERPL DL<=0.005 MIU/L-ACNC: 1.46 UIU/ML (ref 0.45–4.5)
VLDLC SERPL CALC-MCNC: 25 MG/DL (ref 5–40)
WBC # BLD AUTO: 13.6 X10E3/UL (ref 3.4–10.8)

## 2019-12-20 ENCOUNTER — HOSPITAL ENCOUNTER (OUTPATIENT)
Dept: CT IMAGING | Facility: HOSPITAL | Age: 49
Discharge: HOME | End: 2019-12-20
Admitting: NURSE PRACTITIONER

## 2019-12-20 DIAGNOSIS — R91.1 LUNG NODULE: ICD-10-CM

## 2019-12-20 PROCEDURE — 71250 CT THORAX DX C-: CPT

## 2019-12-23 ENCOUNTER — APPOINTMENT (OUTPATIENT)
Dept: DIABETES SERVICES | Facility: HOSPITAL | Age: 49
End: 2019-12-23

## 2019-12-30 ENCOUNTER — TELEPHONE (OUTPATIENT)
Dept: PULMONOLOGY | Facility: CLINIC | Age: 49
End: 2019-12-30

## 2019-12-30 NOTE — TELEPHONE ENCOUNTER
I called the patient today and discussed the CT results. I explained that a PET scan is not reliable at this size nodule and could likely yield a false negative. The most reasonable approach is to repeat the CT in 3 months, which would be March 2020. She verbalizes understanding. She has an appointment with me on 1/8/20 and we will view the CT images together and discuss the results further. I will also review the images with Dr. Schneider as soon as possible. She is in agreement with this plan.       ----- Message from Juliann Mustafa MA sent at 12/30/2019  9:01 AM EST -----  Regarding: FW: Test Results Question  Contact: [phone]      ----- Message -----  From: Azalea eMndoza  Sent: 12/29/2019   9:51 AM EST  To: [facility] Clinical Pool  Subject: Test Results Question                            I have a question about CT CHEST WO CONTRAST resulted on 12/20/19 at 9:18 AM.    I saw the results from the ct scan and it has me really worried about this being cancer.  I have an appt on Jan 8th with you but I was wanting to see if I can get in any sooner with you or Dr Schneider?  Thank you, Sowmya Mendoza

## 2020-01-06 ENCOUNTER — OUTSIDE FACILITY SERVICE (OUTPATIENT)
Dept: PAIN MEDICINE | Facility: CLINIC | Age: 50
End: 2020-01-06

## 2020-01-06 PROCEDURE — 64454 NJX AA&/STRD GNCLR NRV BRNCH: CPT | Performed by: ANESTHESIOLOGY

## 2020-01-07 ENCOUNTER — TELEPHONE (OUTPATIENT)
Dept: PAIN MEDICINE | Facility: CLINIC | Age: 50
End: 2020-01-07

## 2020-01-07 NOTE — TELEPHONE ENCOUNTER
DX RT GENICULAR NERVE BLOCKS (01/06/2020).  Pt reports she still has a bit of pain, she describes it as 15% relief out of 100%.  She had used ice last night, but she returned to work today and hasn't had the time to apply any ice.

## 2020-01-08 ENCOUNTER — APPOINTMENT (OUTPATIENT)
Dept: PREADMISSION TESTING | Facility: HOSPITAL | Age: 50
End: 2020-01-08

## 2020-01-08 ENCOUNTER — OFFICE VISIT (OUTPATIENT)
Dept: PULMONOLOGY | Facility: CLINIC | Age: 50
End: 2020-01-08

## 2020-01-08 VITALS
SYSTOLIC BLOOD PRESSURE: 110 MMHG | RESPIRATION RATE: 18 BRPM | DIASTOLIC BLOOD PRESSURE: 70 MMHG | BODY MASS INDEX: 45.99 KG/M2 | WEIGHT: 293 LBS | HEIGHT: 67 IN | HEART RATE: 82 BPM | OXYGEN SATURATION: 96 % | TEMPERATURE: 98.4 F

## 2020-01-08 DIAGNOSIS — E66.01 MORBID OBESITY, UNSPECIFIED OBESITY TYPE (HCC): ICD-10-CM

## 2020-01-08 DIAGNOSIS — G47.33 OSA (OBSTRUCTIVE SLEEP APNEA): ICD-10-CM

## 2020-01-08 DIAGNOSIS — R91.1 LUNG NODULE: Primary | ICD-10-CM

## 2020-01-08 PROCEDURE — 99214 OFFICE O/P EST MOD 30 MIN: CPT | Performed by: NURSE PRACTITIONER

## 2020-01-08 NOTE — PROGRESS NOTES
"Chief Complaint   Patient presents with   • Follow-up   • Sleeping Problem         Subjective   Azalea Mendoza is a 49 y.o. female.     History of Present Illness   Patient comes back today for follow up of Obstructive Sleep apnea. she doesn't report any issues with the device or mask.     Patient says that she is compliant with her device and using it regularly.    Patient's symptoms of sleep disturbance and daytime sleepiness have been helped greatly with the use of PAP device, as prescribed.     She tells me that at a couple of doctors appointments recently her health care provider was concerned that her temperature seemed elevated.  She tells me the temperature readings were 99.6 and 100.  She has no signs or symptoms of infection.  She denies any congestion, cough or difficulty urinating.    The following portions of the patient's history were reviewed and updated as appropriate: allergies, current medications, past family history, past medical history, past social history and past surgical history.    Review of Systems   Constitutional: Positive for fever (She states she has had a fever the past couple of times she has been to the Dr and not sure why. ). Negative for chills.   HENT: Negative for rhinorrhea, sinus pressure and sore throat.    Respiratory: Negative for cough, chest tightness, shortness of breath and wheezing.    Psychiatric/Behavioral: Negative for sleep disturbance.       Objective   Visit Vitals  /70   Pulse 82   Temp 98.4 °F (36.9 °C)   Resp 18   Ht 168.9 cm (66.5\")   Wt (!) 163 kg (359 lb)   SpO2 96%   BMI 57.08 kg/m²     Physical Exam   Constitutional: She is oriented to person, place, and time. She appears well-developed and well-nourished.   HENT:   Head: Normocephalic and atraumatic.   Crowded oropharynx.    Pulmonary/Chest: Effort normal and breath sounds normal. She has no wheezes.   Musculoskeletal:   Gait was normal.   Neurological: She is alert and oriented to person, " place, and time.   Psychiatric: She has a normal mood and affect.   Vitals reviewed.          Assessment/Plan   Azalea was seen today for follow-up and sleeping problem.    Diagnoses and all orders for this visit:    Lung nodule  -     CT Chest Without Contrast; Future    LUCERO (obstructive sleep apnea)    Morbid obesity, unspecified obesity type (CMS/HCC)           Return in about 10 weeks (around 3/18/2020) for Recheck, Imaging studies.    DISCUSSION (if any):  Continue treatment with AutoPAP at a pressure of 100/15, with a full-face mask.    Patient's compliance data was reviewed and the compliance is greater than 90%.    Humidification setup, hose and mask care discussed.    Weight loss advised.    Use every night for at least 4 hours stressed.    Will repeat CT in early March. Reviewed CT images with the patient and discussed the results.  The CT done in December shows a 9 mm nodule in the right middle lobe with irregular margins.  This is not a significant difference when compared to the previous CT scan done in October at the [facility].  She will have a repeat CT in early March to evaluate stability.    Dictated utilizing Dragon dictation.    This document was electronically signed by JARRET Robert January 10, 2020  1:58 PM

## 2020-01-13 ENCOUNTER — OFFICE VISIT (OUTPATIENT)
Dept: PAIN MEDICINE | Facility: CLINIC | Age: 50
End: 2020-01-13

## 2020-01-13 VITALS
DIASTOLIC BLOOD PRESSURE: 78 MMHG | RESPIRATION RATE: 20 BRPM | SYSTOLIC BLOOD PRESSURE: 128 MMHG | BODY MASS INDEX: 57.46 KG/M2 | WEIGHT: 293 LBS | HEART RATE: 79 BPM | TEMPERATURE: 98.6 F | OXYGEN SATURATION: 98 %

## 2020-01-13 DIAGNOSIS — Z99.89 OSA ON CPAP: ICD-10-CM

## 2020-01-13 DIAGNOSIS — G47.33 OSA ON CPAP: ICD-10-CM

## 2020-01-13 DIAGNOSIS — I48.91 ATRIAL FIBRILLATION WITH RAPID VENTRICULAR RESPONSE (HCC): ICD-10-CM

## 2020-01-13 DIAGNOSIS — IMO0002 UNCONTROLLED TYPE 2 DIABETES MELLITUS WITH PERIPHERAL NEUROPATHY: ICD-10-CM

## 2020-01-13 DIAGNOSIS — E66.01 MORBID OBESITY WITH BMI OF 50.0-59.9, ADULT (HCC): ICD-10-CM

## 2020-01-13 DIAGNOSIS — M17.0 BILATERAL PRIMARY OSTEOARTHRITIS OF KNEE: ICD-10-CM

## 2020-01-13 DIAGNOSIS — M17.0 BILATERAL PRIMARY OSTEOARTHRITIS OF KNEE: Primary | ICD-10-CM

## 2020-01-13 PROCEDURE — 99213 OFFICE O/P EST LOW 20 MIN: CPT | Performed by: NURSE PRACTITIONER

## 2020-01-13 RX ORDER — FLUTICASONE PROPIONATE 50 MCG
SPRAY, SUSPENSION (ML) NASAL
COMMUNITY
End: 2021-04-22

## 2020-01-21 RX ORDER — EPINEPHRINE 0.3 MG/.3ML
INJECTION INTRAMUSCULAR
Qty: 1 EACH | Refills: 1 | OUTPATIENT
Start: 2020-01-21

## 2020-01-24 DIAGNOSIS — IMO0002 UNCONTROLLED TYPE 2 DIABETES MELLITUS WITH COMPLICATION, WITH LONG-TERM CURRENT USE OF INSULIN: ICD-10-CM

## 2020-01-24 RX ORDER — PRAVASTATIN SODIUM 20 MG
TABLET ORAL
Qty: 90 TABLET | Refills: 3 | Status: SHIPPED | OUTPATIENT
Start: 2020-01-24 | End: 2020-10-26

## 2020-03-06 ENCOUNTER — HOSPITAL ENCOUNTER (OUTPATIENT)
Dept: CT IMAGING | Facility: HOSPITAL | Age: 50
Discharge: HOME | End: 2020-03-06
Admitting: NURSE PRACTITIONER

## 2020-03-06 DIAGNOSIS — R91.1 LUNG NODULE: ICD-10-CM

## 2020-03-06 PROCEDURE — 71250 CT THORAX DX C-: CPT

## 2020-03-09 ENCOUNTER — OFFICE VISIT (OUTPATIENT)
Dept: PULMONOLOGY | Facility: CLINIC | Age: 50
End: 2020-03-09

## 2020-03-09 VITALS
HEART RATE: 84 BPM | OXYGEN SATURATION: 91 % | BODY MASS INDEX: 45.99 KG/M2 | WEIGHT: 293 LBS | HEIGHT: 67 IN | SYSTOLIC BLOOD PRESSURE: 126 MMHG | DIASTOLIC BLOOD PRESSURE: 78 MMHG | RESPIRATION RATE: 18 BRPM

## 2020-03-09 DIAGNOSIS — J20.9 ACUTE BRONCHITIS, UNSPECIFIED ORGANISM: ICD-10-CM

## 2020-03-09 DIAGNOSIS — G47.33 OSA (OBSTRUCTIVE SLEEP APNEA): ICD-10-CM

## 2020-03-09 DIAGNOSIS — R91.1 LUNG NODULE: Primary | ICD-10-CM

## 2020-03-09 DIAGNOSIS — E66.01 MORBID OBESITY, UNSPECIFIED OBESITY TYPE (HCC): ICD-10-CM

## 2020-03-09 PROCEDURE — 99214 OFFICE O/P EST MOD 30 MIN: CPT | Performed by: NURSE PRACTITIONER

## 2020-03-09 RX ORDER — ALBUTEROL SULFATE 90 UG/1
AEROSOL, METERED RESPIRATORY (INHALATION) EVERY 4 HOURS
COMMUNITY
End: 2022-02-03 | Stop reason: SDUPTHER

## 2020-03-09 NOTE — PROGRESS NOTES
"Chief Complaint   Patient presents with   • Follow-up   • Sleeping Problem   • Breathing Problem         Subjective   Azalea Mendoza is a 49 y.o. female.     History of Present Illness   The patient comes in today for an increased cough and shortness of breath.     She has had a productive cough for the last week with increased shortness of breath and nasal congestion. She was treated with antibiotics and steroids but continues to have a cough with thick sputum.     She was told she had asthma several years ago but is not on inhaled therapy for asthma. She was given a rescue inhaler since she has been sick and it seems to help some.     She is using AutoPAP 10/15 without any issue except when the cough is bad.     The following portions of the patient's history were reviewed and updated as appropriate: allergies, current medications, past family history, past medical history, past social history and past surgical history.    Review of Systems   HENT: Positive for rhinorrhea, sinus pressure, sneezing and sore throat.    Respiratory: Positive for cough and wheezing. Negative for shortness of breath.        Objective   Visit Vitals  /78   Pulse 84   Resp 18   Ht 168.9 cm (66.5\")   Wt (!) 161 kg (355 lb)   SpO2 91%   BMI 56.44 kg/m²     Physical Exam   Constitutional: She is oriented to person, place, and time.   HENT:   Head: Normocephalic and atraumatic.   Crowded oropharynx. Tonsils present.   Eyes: EOM are normal.   Neck: Neck supple.   Cardiovascular: Normal rate and regular rhythm.   Pulmonary/Chest: Effort normal and breath sounds normal. No respiratory distress. She has no wheezes.   Musculoskeletal: She exhibits no edema.   Gait normal.   Neurological: She is alert and oriented to person, place, and time.   Psychiatric: She has a normal mood and affect.   Vitals reviewed.          Assessment/Plan   Azalea was seen today for follow-up, sleeping problem and breathing problem.    Diagnoses and all " orders for this visit:    Lung nodule  -     CT Chest Without Contrast; Future    LUCERO (obstructive sleep apnea)    Morbid obesity, unspecified obesity type (CMS/HCC)    Acute bronchitis, unspecified organism           Return in about 6 months (around 9/9/2020).    DISCUSSION (if any):  I reviewed her CT scan from last week and discussed the results with her. It reveals a stable 9 mm nodule. A repeat CT has been ordered for November 2020.     Continue treatment with AutoPAP at a pressure of 10/15, with a full-face mask.    Patient's compliance data was reviewed and the compliance is 100%.    Humidification setup, hose and mask care discussed.    Weight loss advised.    Use every night for at least 4 hours stressed.    I will try her on an ICS/lama sample to see if this helps her breathing. If she continues to have bronchitis type issues and complaints she will need to be scheduled for PFT.    Dictated utilizing Dragon dictation.    This document was electronically signed by JARRET Robert March 9, 2020  10:35 AM

## 2020-06-11 ENCOUNTER — HOSPITAL ENCOUNTER (EMERGENCY)
Facility: HOSPITAL | Age: 50
Discharge: HOME | End: 2020-06-11
Attending: EMERGENCY MEDICINE | Admitting: EMERGENCY MEDICINE

## 2020-06-11 VITALS
DIASTOLIC BLOOD PRESSURE: 75 MMHG | OXYGEN SATURATION: 95 % | HEART RATE: 73 BPM | RESPIRATION RATE: 16 BRPM | BODY MASS INDEX: 45.99 KG/M2 | SYSTOLIC BLOOD PRESSURE: 153 MMHG | TEMPERATURE: 97 F | WEIGHT: 293 LBS | HEIGHT: 67 IN

## 2020-06-11 DIAGNOSIS — R73.9 HYPERGLYCEMIA: Primary | ICD-10-CM

## 2020-06-11 LAB
ALBUMIN SERPL-MCNC: 4.7 G/DL (ref 3.5–5.2)
ALBUMIN/GLOB SERPL: 1.4 G/DL
ALP SERPL-CCNC: 135 U/L (ref 39–117)
ALT SERPL W P-5'-P-CCNC: 8 U/L (ref 1–33)
ANION GAP SERPL CALCULATED.3IONS-SCNC: 17.3 MMOL/L (ref 5–15)
AST SERPL-CCNC: 13 U/L (ref 1–32)
BASOPHILS # BLD AUTO: 0.06 10*3/MM3 (ref 0–0.2)
BASOPHILS NFR BLD AUTO: 0.3 % (ref 0–1.5)
BILIRUB SERPL-MCNC: 0.3 MG/DL (ref 0.2–1.2)
BILIRUB UR QL STRIP: NEGATIVE
BUN BLD-MCNC: 22 MG/DL (ref 6–20)
BUN/CREAT SERPL: 25.3 (ref 7–25)
CALCIUM SPEC-SCNC: 10.2 MG/DL (ref 8.6–10.5)
CHLORIDE SERPL-SCNC: 95 MMOL/L (ref 98–107)
CLARITY UR: CLEAR
CO2 SERPL-SCNC: 20.7 MMOL/L (ref 22–29)
COLOR UR: YELLOW
CREAT BLD-MCNC: 0.87 MG/DL (ref 0.57–1)
DEPRECATED RDW RBC AUTO: 41.6 FL (ref 37–54)
EOSINOPHIL # BLD AUTO: 0.01 10*3/MM3 (ref 0–0.4)
EOSINOPHIL NFR BLD AUTO: 0.1 % (ref 0.3–6.2)
ERYTHROCYTE [DISTWIDTH] IN BLOOD BY AUTOMATED COUNT: 13.4 % (ref 12.3–15.4)
GFR SERPL CREATININE-BSD FRML MDRD: 69 ML/MIN/1.73
GLOBULIN UR ELPH-MCNC: 3.3 GM/DL
GLUCOSE BLD-MCNC: 504 MG/DL (ref 65–99)
GLUCOSE BLDC GLUCOMTR-MCNC: 341 MG/DL (ref 70–130)
GLUCOSE BLDC GLUCOMTR-MCNC: 427 MG/DL (ref 70–130)
GLUCOSE UR STRIP-MCNC: ABNORMAL MG/DL
HCT VFR BLD AUTO: 47.1 % (ref 34–46.6)
HGB BLD-MCNC: 15.3 G/DL (ref 12–15.9)
HGB UR QL STRIP.AUTO: NEGATIVE
HOLD SPECIMEN: NORMAL
HOLD SPECIMEN: NORMAL
IMM GRANULOCYTES # BLD AUTO: 0.17 10*3/MM3 (ref 0–0.05)
IMM GRANULOCYTES NFR BLD AUTO: 0.9 % (ref 0–0.5)
KETONES UR QL STRIP: NEGATIVE
LEUKOCYTE ESTERASE UR QL STRIP.AUTO: NEGATIVE
LYMPHOCYTES # BLD AUTO: 2.37 10*3/MM3 (ref 0.7–3.1)
LYMPHOCYTES NFR BLD AUTO: 13.2 % (ref 19.6–45.3)
MCH RBC QN AUTO: 27.4 PG (ref 26.6–33)
MCHC RBC AUTO-ENTMCNC: 32.5 G/DL (ref 31.5–35.7)
MCV RBC AUTO: 84.3 FL (ref 79–97)
MONOCYTES # BLD AUTO: 0.15 10*3/MM3 (ref 0.1–0.9)
MONOCYTES NFR BLD AUTO: 0.8 % (ref 5–12)
NEUTROPHILS # BLD AUTO: 15.18 10*3/MM3 (ref 1.7–7)
NEUTROPHILS NFR BLD AUTO: 84.7 % (ref 42.7–76)
NITRITE UR QL STRIP: NEGATIVE
NRBC BLD AUTO-RTO: 0 /100 WBC (ref 0–0.2)
PH UR STRIP.AUTO: 6.5 [PH] (ref 5–8)
PLATELET # BLD AUTO: 406 10*3/MM3 (ref 140–450)
PMV BLD AUTO: 9.4 FL (ref 6–12)
POTASSIUM BLD-SCNC: 4.3 MMOL/L (ref 3.5–5.2)
PROT SERPL-MCNC: 8 G/DL (ref 6–8.5)
PROT UR QL STRIP: NEGATIVE
RBC # BLD AUTO: 5.59 10*6/MM3 (ref 3.77–5.28)
SODIUM BLD-SCNC: 133 MMOL/L (ref 136–145)
SP GR UR STRIP: 1.02 (ref 1–1.03)
UROBILINOGEN UR QL STRIP: ABNORMAL
WBC NRBC COR # BLD: 17.94 10*3/MM3 (ref 3.4–10.8)
WHOLE BLOOD HOLD SPECIMEN: NORMAL
WHOLE BLOOD HOLD SPECIMEN: NORMAL

## 2020-06-11 PROCEDURE — 85025 COMPLETE CBC W/AUTO DIFF WBC: CPT

## 2020-06-11 PROCEDURE — 81003 URINALYSIS AUTO W/O SCOPE: CPT

## 2020-06-11 PROCEDURE — 99283 EMERGENCY DEPT VISIT LOW MDM: CPT

## 2020-06-11 PROCEDURE — 82962 GLUCOSE BLOOD TEST: CPT

## 2020-06-11 PROCEDURE — 80053 COMPREHEN METABOLIC PANEL: CPT

## 2020-06-11 PROCEDURE — 63710000001 INSULIN REGULAR HUMAN PER 5 UNITS: Performed by: EMERGENCY MEDICINE

## 2020-06-11 RX ORDER — SODIUM CHLORIDE 0.9 % (FLUSH) 0.9 %
10 SYRINGE (ML) INJECTION AS NEEDED
Status: DISCONTINUED | OUTPATIENT
Start: 2020-06-11 | End: 2020-06-11 | Stop reason: HOSPADM

## 2020-06-11 RX ADMIN — SODIUM CHLORIDE 1000 ML: 9 INJECTION, SOLUTION INTRAVENOUS at 00:38

## 2020-06-11 RX ADMIN — HUMAN INSULIN 8 UNITS: 100 INJECTION, SOLUTION SUBCUTANEOUS at 02:00

## 2020-06-11 NOTE — ED PROVIDER NOTES
Subjective   This patient has a history of insulin-dependent diabetes and takes 70/30 insulin morning and evening.  She got into for diarrhea today and saw her PCP yesterday and was a prescription for prednisone taper starting 50 mg/day for 5 days then tapering, intramuscular steroid and states today her blood glucose was over 600.  She took 50 units instead of her normal 40 units of her 7030 in the evening and not much improvement in her blood glucose she called the on-call doctor and was told to come the ER for further evaluation.  She says she has mild headache and some blurry vision.  No abdominal pain nausea vomiting or diarrhea.  No chest pain or shortness of breath.  She has poison ivy to her trunk and abdomen          Review of Systems   Constitutional: Negative.    Eyes: Negative.         Blurry vision   Respiratory: Negative.    Cardiovascular: Negative.    Gastrointestinal: Negative.    Endocrine:        Hyperglycemia   Genitourinary: Negative.    Musculoskeletal: Negative.    Skin: Negative.         Poison ivy to the abdomen   Neurological: Positive for headaches.   Psychiatric/Behavioral: Negative.        Past Medical History:   Diagnosis Date   • A-fib (CMS/[facility])     follows w/ Dr. Chau, on Eliquis   • Allergic rhinitis due to pollen    • Anxiety    • Asthma     mild, prn inhalers only, follows w/ Dr. Schneider    • Back pain, lumbosacral    • Cervicalgia    • Diabetic neuropathy (CMS/[facility])    • DM2 (diabetes mellitus, type 2) (CMS/[facility])     dx 2005, on insulin 2+years, follows w/ Endocrinology, A1C >9   • Dyslipidemia    • Dyspepsia    • Dyspnea on exertion    • Fatigue    • Gallstones    • GERD (gastroesophageal reflux disease)     daily Omeprazole 20mg, controlled, denies recent EGD    • H/O cystoscopy     diagnostic cystoscopy   • Heart murmur    • Hyperparathyroidism (CMS/[facility])     s/p (R) parathyroidectomy 9/2019 @[facility]   • Hypertension    • IBS (irritable bowel syndrome)     exacerbated w/ salads and/or  "greasy foods, does not follow w/ GI   • Lump or mass in breast     last mammogram 1/2019 OK   • Lung nodule     RUL, 8mm, repeat Chest CT planned 12/2019   • Morbid obesity with BMI of 50.0-59.9, adult (CMS/[facility])    • Muscle spasm    • Osteoarthritis     takes prn Tylenol, denies NSAIDS/steroids   • Ovarian cancer (CMS/[facility])     dx 2000, s/p TAHBSO, no chemo/XRT   • Overactive bladder     on Detrol   • Primary hyperparathyroidism (CMS/[facility]) 6/11/2018   • Right knee pain     pursuing \"nerve block\" w/ pain management, avoids NSAIDS/steroids   • Shingles    • Sleep apnea     CPAP compliant   • Thyroid nodule     follows w/ Dr. Yin @, conservative management       Allergies   Allergen Reactions   • Fish Allergy Anaphylaxis, Itching and Nausea And Vomiting   • Shellfish Allergy Anaphylaxis   • Nuts Other (See Comments)     Based on allergy testing, avoids pecans/walnuts, but tolerates peanuts/PB w/out issue   • Ciprofloxacin-Dexamethasone Rash     Otic drops caused rash around the ears, but tolerates Cipro PO w/out issue   • Neomycin-Bacitracin Zn-Polymyx Rash   • Penicillins Palpitations     Keflex OK        Past Surgical History:   Procedure Laterality Date   • COLONOSCOPY  2013   • HX OVARIAN CYSTECTOMY  1997    (open) lower transverse incision   • LAPAROSCOPIC OVARIAN CYSTECTOMY  2000    w/ CA dx   • PARATHYROIDECTOMY  09/22/2019    (R) parathyroidectomy w/ Dr. Yin @    • TOTAL ABDOMINAL HYSTERECTOMY WITH SALPINGO OOPHORECTOMY  09/2000    ovarian CA       Family History   Problem Relation Age of Onset   • Hypertension Mother    • Arthritis Mother    • Arthritis Father    • Hypertension Father    • Hyperlipidemia Father    • Diabetes Father         type 2   • Stroke Father    • Obesity Father    • Cervical cancer Maternal Grandmother    • Heart attack Maternal Grandfather    • Leukemia Paternal Grandmother    • Alzheimer's disease Paternal Grandfather        Social History     Socioeconomic History   • Marital " status:      Spouse name: Not on file   • Number of children: Not on file   • Years of education: Not on file   • Highest education level: Not on file   Tobacco Use   • Smoking status: Never Smoker   • Smokeless tobacco: Never Used   Substance and Sexual Activity   • Alcohol use: No   • Drug use: No   • Sexual activity: Defer   Social History Narrative    Living in [place] w/ .  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             Objective   Physical Exam   Constitutional: She appears well-developed and well-nourished.   HENT:   Head: Normocephalic and atraumatic.   Eyes: EOM are normal.   Neck: Normal range of motion.   Cardiovascular: Normal rate and regular rhythm.   Pulmonary/Chest: Effort normal.   Abdominal: Soft.   Musculoskeletal: Normal range of motion.   Neurological: She is alert.   Skin:   Findings consistent with contact dermatitis due to poison ivy to the abdomen   Psychiatric: She has a normal mood and affect. Her behavior is normal. Thought content normal.   Nursing note and vitals reviewed.      Procedures           ED Course                                           MDM  Patient presented with hyper glycemia after receiving steroids for poison ivy.  Recommend decreasing the dose on her steroids and laboratories also reassuring.  No signs of DKA.  Hyperglycemia improved with fluids and insulin.  Appropriate for discharge.      Final diagnoses:   Hyperglycemia            Arnol Wong, DO  06/11/20 0655

## 2020-09-04 NOTE — TELEPHONE ENCOUNTER
Called patient regarding CardioMEMS readings.  PAD (Pulmonary artery diastolic) 31 mmHg today.  Goal PAD less than 29 mmHg.     Patient states after increasing to 60 of furosemide, she felt a little better.    Weight: 139 this am (fluctuates 2-3 pounds) yesterday was 140    Symptoms: shortness of breath; bilateral lower extremity; bloating     Instructed patient to increase furosemide to 60 mg in am and 40 in afternoon starting today and over the weekend.    Will reassess on Tuesday.    If PA pressures still, elevated, will send patient to get labs and discuss with Dr. Richmond       Patient called and states she used to see dr hall and it did not work out. She started to see dr duong but he is leaving [place] in December. She is now interested in switching to dr amanda. Please advise.

## 2020-09-09 ENCOUNTER — OFFICE VISIT (OUTPATIENT)
Dept: PULMONOLOGY | Facility: CLINIC | Age: 50
End: 2020-09-09

## 2020-09-09 VITALS
OXYGEN SATURATION: 97 % | BODY MASS INDEX: 45.99 KG/M2 | HEIGHT: 67 IN | TEMPERATURE: 97.1 F | RESPIRATION RATE: 16 BRPM | HEART RATE: 74 BPM | SYSTOLIC BLOOD PRESSURE: 124 MMHG | DIASTOLIC BLOOD PRESSURE: 76 MMHG | WEIGHT: 293 LBS

## 2020-09-09 DIAGNOSIS — J45.20 MILD INTERMITTENT ASTHMA WITHOUT COMPLICATION: ICD-10-CM

## 2020-09-09 DIAGNOSIS — R91.1 LUNG NODULE: ICD-10-CM

## 2020-09-09 DIAGNOSIS — G47.33 OSA (OBSTRUCTIVE SLEEP APNEA): Primary | ICD-10-CM

## 2020-09-09 DIAGNOSIS — E66.01 MORBID OBESITY, UNSPECIFIED OBESITY TYPE (HCC): ICD-10-CM

## 2020-09-09 PROCEDURE — 99214 OFFICE O/P EST MOD 30 MIN: CPT | Performed by: NURSE PRACTITIONER

## 2020-09-09 NOTE — PROGRESS NOTES
"Chief Complaint   Patient presents with   • Follow-up     Lung Nodule   • Sleeping Problem         Subjective   Azalea Mendoza is a 49 y.o. female.     History of Present Illness   Patient comes back today for follow up of Obstructive Sleep apnea. she doesn't report any issues with the device or mask.     Patient says that she is compliant with her device and using it regularly.    Patient's symptoms of sleep disturbance and daytime sleepiness have been helped greatly with the use of PAP device, as prescribed.  Overall she feels more rested and is not having any issues using the machine.    She has had no respiratory illness since her last visit.  At her last visit she was given a sample of ICS/lama to use as she was having an episode of bronchitis.  She states she does not remember if she used this inhaler or not but her symptoms resolved with the steroids and she has not had the symptoms since.    She states she was told years ago that she had mild asthma and was on allergy injections.  However she stopped seeing the allergist and stopped the allergy injections many years ago.  She states she has not had any significant issues until the last year.      The following portions of the patient's history were reviewed and updated as appropriate: allergies, current medications, past family history, past medical history, past social history and past surgical history.    Review of Systems   Constitutional: Negative for chills and fever.   HENT: Negative for rhinorrhea, sinus pressure, sneezing and sore throat.    Respiratory: Negative for cough, chest tightness, shortness of breath and wheezing.    Psychiatric/Behavioral: Negative for sleep disturbance.       Objective   Visit Vitals  /76   Pulse 74   Temp 97.1 °F (36.2 °C)   Resp 16   Ht 170.2 cm (67\")   Wt (!) 172 kg (379 lb)   SpO2 97%   BMI 59.36 kg/m²       Physical Exam   Constitutional: She is oriented to person, place, and time.   HENT:   Head: " Normocephalic and atraumatic.   Crowded oropharynx.    Eyes: EOM are normal.   Neck: Neck supple.   Cardiovascular: Normal rate and regular rhythm.   Pulmonary/Chest: Effort normal. No respiratory distress.   Somewhat decreased A/E without wheezing noted.   Abdominal:   Obese abdomen.   Musculoskeletal: She exhibits no edema.   Gait normal.   Neurological: She is alert and oriented to person, place, and time.   Psychiatric: She has a normal mood and affect.   Vitals reviewed.          Assessment/Plan   Azalea was seen today for follow-up and sleeping problem.    Diagnoses and all orders for this visit:    LUCERO (obstructive sleep apnea)    Morbid obesity, unspecified obesity type (CMS/HCC)    Lung nodule    Mild intermittent asthma without complication           Return in about 6 months (around 3/9/2021) for Recheck, For Dr. Schneider, Imaging studies.    DISCUSSION (if any):  Continue treatment with AutoPAP at a pressure of 10/15, with a nasal pillows.    Patient's compliance data was reviewed and the compliance is 100%.    Humidification setup, hose and mask care discussed.    Weight loss advised.    Use every night for at least 4 hours stressed.    At this time, the shortness of breath is stable.  She has a rescue inhaler to use if needed but is not requiring this inhaler.      If she has any further episodes of bronchitis/asthma, denied told her that she will need to have a PFT and likely try an ICS inhaler on a regular basis.  She verbalizes understanding.    I have told her that it is likely she has mild asthma.    She has a 9 mm right lung nodule which we will continue following.  Her next CT scan is scheduled for November 2020.    Dictated utilizing Dragon dictation.    This document was electronically signed by JARRET Robert September 9, 2020  11:01

## 2020-10-26 ENCOUNTER — OFFICE VISIT (OUTPATIENT)
Dept: ORTHOPEDIC SURGERY | Facility: CLINIC | Age: 50
End: 2020-10-26

## 2020-10-26 VITALS — OXYGEN SATURATION: 98 % | BODY MASS INDEX: 45.99 KG/M2 | HEART RATE: 116 BPM | HEIGHT: 67 IN | WEIGHT: 293 LBS

## 2020-10-26 DIAGNOSIS — M17.0 PRIMARY OSTEOARTHRITIS OF BOTH KNEES: Primary | ICD-10-CM

## 2020-10-26 PROCEDURE — 99213 OFFICE O/P EST LOW 20 MIN: CPT | Performed by: ORTHOPAEDIC SURGERY

## 2020-10-26 NOTE — PROGRESS NOTES
[facility] [place] [facility] Note    Subjective     Chief Complaint   Patient presents with   • Follow-up     1 year follow up; Primary osteoarthritis of both knees         HPI    It has been 1  year(s) since Ms. Mendoza's last visit. She returns to clinic today for follow-up of bilateral knee pain. She rates her pain a 9/10 on the pain scale. Previous/current treatments: NSAIDS, physical therapy and oral steroids. Current symptoms: pain, swelling, popping, grinding, stiffness and giving way/buckling. The pain is worse with walking, standing, sitting, climbing stairs, sleeping, working and leisure; resting and pain medication and/or NSAID improve the pain. Overall, she is doing worse.  Continued pain, and wanted discussed the possibility of any further treatment options and possible bracing.    I have reviewed the following portions of the patient's history and agree with: History of Present Illness and Review of Systems    Patient Active Problem List   Diagnosis   • Abnormal liver enzymes   • Morbid obesity (CMS/HCC)   • Arthralgia of multiple joints   • Mixed hyperlipidemia   • LUCERO on CPAP   • Allergic rhinitis due to pollen   • Asthma   • Benign essential hypertension   • Hyperactivity of bladder   • Lymphadenopathy, axillary   • Avulsed toenail   • Uncontrolled type 2 diabetes mellitus with peripheral neuropathy (CMS/HCC)   • Back pain   • Neck pain   • Hematuria   • Mass of breast   • Proteinuria   • Herpes zoster   • Atrial fibrillation with rapid ventricular response (CMS/HCC)   • Leukocytosis   • Chronic pain of right ankle   • Venous stasis   • Multinodular goiter (nontoxic)   • Bilateral primary osteoarthritis of knee   • Chronic anticoagulation   • Overactive bladder   • GERD (gastroesophageal reflux disease)   • Diabetic neuropathy (CMS/HCC)   • Ovarian cancer (CMS/HCC)   • Lump or mass in breast   • IBS (irritable bowel syndrome)   • Lung nodule   • Dyslipidemia   • Heart murmur   •  "Osteoarthritis   • Right knee pain   • Fatigue   • Dyspepsia   • Dyspnea on exertion   • Hyperparathyroidism (CMS/HCC)   • Morbid obesity with BMI of 50.0-59.9, adult (CMS/HCC)   • Anxiety   • Gallstones   • Gastroesophageal reflux disease     Past Medical History:   Diagnosis Date   • A-fib (CMS/HCC)     follows w/ Dr. Chau, on Eliquis   • Allergic rhinitis due to pollen    • Anxiety    • Asthma     mild, prn inhalers only, follows w/ Dr. Schneider    • Back pain, lumbosacral    • Cervicalgia    • Diabetic neuropathy (CMS/HCC)    • DM2 (diabetes mellitus, type 2) (CMS/HCC)     dx 2005, on insulin 2+years, follows w/ Endocrinology, A1C >9   • Dyslipidemia    • Dyspepsia    • Dyspnea on exertion    • Fatigue    • Gallstones    • GERD (gastroesophageal reflux disease)     daily Omeprazole 20mg, controlled, denies recent EGD    • H/O cystoscopy     diagnostic cystoscopy   • Heart murmur    • Hyperparathyroidism (CMS/HCC)     s/p (R) parathyroidectomy 9/2019 @   • Hypertension    • IBS (irritable bowel syndrome)     exacerbated w/ salads and/or greasy foods, does not follow w/ GI   • Lump or mass in breast     last mammogram 1/2019 OK   • Lung nodule     RUL, 8mm, repeat Chest CT planned 12/2019   • Morbid obesity with BMI of 50.0-59.9, adult (CMS/HCC)    • Muscle spasm    • Osteoarthritis     takes prn Tylenol, denies NSAIDS/steroids   • Ovarian cancer (CMS/HCC)     dx 2000, s/p TAHBSO, no chemo/XRT   • Overactive bladder     on Detrol   • Primary hyperparathyroidism (CMS/HCC) 6/11/2018   • Right knee pain     pursuing \"nerve block\" w/ pain management, avoids NSAIDS/steroids   • Shingles    • Sleep apnea     CPAP compliant   • Thyroid nodule     follows w/ Dr. Yin @, conservative management      Past Surgical History:   Procedure Laterality Date   • COLONOSCOPY  2013   • HX OVARIAN CYSTECTOMY  1997    (open) lower transverse incision   • LAPAROSCOPIC OVARIAN CYSTECTOMY  2000    w/ CA dx   • PARATHYROIDECTOMY  " 09/22/2019    (R) parathyroidectomy w/ Dr. Yin @ [facility]   • TOTAL ABDOMINAL HYSTERECTOMY WITH SALPINGO OOPHORECTOMY  09/2000    ovarian CA      Family History   Problem Relation Age of Onset   • Hypertension Mother    • Arthritis Mother    • Arthritis Father    • Hypertension Father    • Hyperlipidemia Father    • Diabetes Father         type 2   • Stroke Father    • Obesity Father    • Cervical cancer Maternal Grandmother    • Heart attack Maternal Grandfather    • Leukemia Paternal Grandmother    • Alzheimer's disease Paternal Grandfather      Social History     Socioeconomic History   • Marital status:      Spouse name: Not on file   • Number of children: Not on file   • Years of education: Not on file   • Highest education level: Not on file   Tobacco Use   • Smoking status: Never Smoker   • Smokeless tobacco: Never Used   Substance and Sexual Activity   • Alcohol use: No   • Drug use: No   • Sexual activity: Defer   Social History Narrative    Living in [place] w/ .  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        Current Outpatient Medications on File Prior to Visit   Medication Sig Dispense Refill   • albuterol sulfate HFA (PROAIR HFA) 108 (90 Base) MCG/ACT inhaler Every 4 (Four) Hours.     • apixaban (ELIQUIS) 5 MG tablet tablet Take 1 tablet by mouth Every 12 (Twelve) Hours. 60 tablet 0   • cetirizine (zyrTEC) 10 MG tablet Take 1 tablet (10 mg total) by mouth Every Night 90 tablet 3   • Continuous Blood Gluc Sensor (DEXCOM G6 SENSOR) Every 10 (Ten) Days. 3 each 11   • Continuous Blood Gluc Transmit (DEXCOM G6 TRANSMITTER) misc 1 each Every 3 (Three) Months. 1 each 4   • diltiaZEM (TIAZAC) 360 MG 24 hr capsule Take 360 mg by mouth Daily.     • EPIPEN 2-DAVID 0.3 MG/0.3ML solution auto-injector injection USE AS INSTRUCTED 2 each 1   • escitalopram (LEXAPRO) 10 MG tablet Take 10 mg by mouth 2 (Two) Times a Day.  2   • fluticasone (FLONASE) 50 MCG/ACT nasal spray fluticasone propionate 50 mcg/actuation nasal  spray,suspension   PRN     • Gel Base gel PRILOCAINE 2% LIDOCAINE 10% IMIPRAMINE 3% CAPSAICIN 0.001% MANNITOL 20%. APPLY 1-2 G TO AFFECTED AREA 3-4 TIMES DAILY 240 g PRN   • glucose blood test strip 1 each by Other route 3 (Three) Times a Day Use as instructed 300 each 3   • Insulin Pen Needle (PEN NEEDLES) 32G X 4 MM misc 1 each 2 (Two) Times a Day Before Meals. 200 each 3   • losartan (COZAAR) 100 MG tablet Take 1 tablet (100 mg total) by mouth Daily 90 tablet 3   • metFORMIN ER (GLUCOPHAGE-XR) 500 MG 24 hr tablet Take 1 tablet by mouth 2 (Two) Times a Day Before Meals. 180 tablet 3   • Mometasone Furo-Formoterol Fum (DULERA IN) Dulera     • NOVOLOG MIX 70/30 FLEXPEN (70-30) 100 UNIT/ML suspension pen-injector injection 40 units before breakfast and 40 units before dinner 10 pen 5   • omeprazole (priLOSEC) 20 MG capsule TAKE ONE CAPSULE BY MOUTH ONCE DAILY 90 capsule 1   • PREMARIN 0.625 MG/GM vaginal cream      • tolterodine LA (DETROL LA) 4 MG 24 hr capsule TAKE 1 CAPSULE BY MOUTH EVERY DAY 90 capsule 1   • triamterene-hydrochlorothiazide (MAXZIDE-25) 37.5-25 MG per tablet Take 1 tablet by mouth Daily. 30 tablet 0   • Vitamin D, Cholecalciferol, 1000 units tablet Take 1,000 Units by mouth Daily.     • [DISCONTINUED] pravastatin (PRAVACHOL) 20 MG tablet TAKE 1 TABLET BY MOUTH EVERY DAY 90 tablet 3     No current facility-administered medications on file prior to visit.       Allergies   Allergen Reactions   • Fish Allergy Anaphylaxis, Itching and Nausea And Vomiting   • Shellfish Allergy Anaphylaxis   • Nuts Other (See Comments)     Based on allergy testing, avoids pecans/walnuts, but tolerates peanuts/PB w/out issue   • Ciprofloxacin-Dexamethasone Rash     Otic drops caused rash around the ears, but tolerates Cipro PO w/out issue   • Neomycin-Bacitracin Zn-Polymyx Rash   • Penicillins Palpitations     Keflex OK         Review of Systems   Constitutional: Positive for activity change.   HENT: Negative.    Eyes:  "Negative.    Respiratory: Positive for apnea.    Cardiovascular: Positive for palpitations.   Gastrointestinal: Negative.    Endocrine: Negative.    Genitourinary: Positive for frequency and pelvic pain.   Musculoskeletal: Positive for arthralgias, back pain and joint swelling.   Skin: Negative.    Allergic/Immunologic: Positive for environmental allergies and food allergies.   Neurological: Negative.    Hematological: Negative.    Psychiatric/Behavioral: The patient is nervous/anxious.         Objective      Physical Exam  Pulse 116   Ht 170.2 cm (67.01\")   Wt (!) 178 kg (391 lb 6.4 oz)   SpO2 98%   BMI 61.29 kg/m²     Body mass index is 61.29 kg/m².    General:   Mental Status:  Alert   Appearance: Cooperative, in no acute distress   Build and Nutrition: Obese female   Orientation: Alert and oriented to person, place and time   Posture: Normal   Gait: Antalgic gait    Lower Extremities:              Right Knee:                          Tenderness:     None                          Effusion:          None                          Swelling:          None                          Crepitus:          Positive                          Atrophy:           None                          Range of motion:        Extension:       10°                                                              Flexion:           90°  Instability:        No varus laxity, no valgus laxity, negative anterior drawer  Deformities:     None              Left Knee:                          Tenderness:     None                          Effusion:          None                          Swelling:          None                          Crepitus:          Positive                          Atrophy:           None                          Range of motion:        Extension:       0°                                                              Flexion:           110°  Instability:        No varus laxity, no valgus laxity, negative anterior " drawer  Deformities:     None    Imaging/Studies  Imaging Results (Last 24 Hours)     Procedure Component Value Units Date/Time    XR Knee 4+ View Bilateral [[id number]] Resulted: 10/26/20 1709     Updated: 10/26/20 1711    Narrative:      Right Knee Radiographs  Indication: right knee pain  Views: Standing AP's and skiers of both knees, with lateral and sunrise   views of the right knee    Comparison: 10/23/2019    Findings:   Bone contact medial compartment, tricompartmental osteophytes, varus   alignment, patellofemoral degeneration, with no acute bony abnormalities.    No unusual bony features.  Mild worsening compared to the previous films.    Left Knee Radiographs  Indication: left knee pain  Views: Standing AP's and skiers of both knees, with lateral and sunrise   views of the left knee    Comparison: 10/23/2019    Findings:   Near bone-on-bone contact medial compartment, tricompartmental   osteophytes, mild varus alignment, patellofemoral degeneration, no acute   bony abnormalities.  No unusual bony features.  Stable compared with   previous films.            Assessment and Plan     Diagnoses and all orders for this visit:    1. Primary osteoarthritis of both knees (Primary)  -     XR Knee 4+ View Bilateral        1. Primary osteoarthritis of both knees        I reviewed my findings with the patient today.  She has advanced bilateral knee arthritis, right greater than left.  Body mass index has increased from her last visit a year ago, from 53-61.  Because of her limb girth, I do not believe that a brace would be effective, and most likely cannot be fitted effectively.  Surgery would be a possibility after weight loss, with a goal of a body mass index below 40, which was again discussed today.  She is considering weight loss measures, and understands the importance.  I will see her back for any worsening or problems in the future.    She is currently under the care of rheumatology and pain  management.    Return if symptoms worsen or fail to improve.    Medical Decision Making  Data/Risk: radiology tests and independent visualization of imaging, lab tests, or EMG/NCV    Zain Verdin MD  10/26/20  17:57 EDT    Dragon disclaimer:  Much of this encounter note is an electronic transcription/translation of spoken language to printed text. The electronic translation of spoken language may permit erroneous, or at times, nonsensical words or phrases to be inadvertently transcribed; Although I have reviewed the note for such errors, some may still exist.

## 2020-11-03 ENCOUNTER — TELEPHONE (OUTPATIENT)
Dept: ORTHOPEDIC SURGERY | Facility: CLINIC | Age: 50
End: 2020-11-03

## 2020-11-03 NOTE — TELEPHONE ENCOUNTER
LEFT VOICEMAIL WITH PATIENT EXPLAINING THAT PAPERWORK THAT WAS RECEIVED IS FOR LONG-TERM DISABILITY WHICH OUR PHYSICIANS DO NOT COMPLETE. PATIENT WILL HAVE TO HAVE PRIMARY CARE PHYSICIAN OR ANOTHER DOCTOR FILL THESE OUT AS DR. GARCIA CANNOT.

## 2020-11-03 NOTE — TELEPHONE ENCOUNTER
PATIENT CALLED BACK AND ACKNOWLEDGED UNDERSTANDING THAT WE WOULD NOT BE ABLE TO COMPLETE JOSENA OR RAFIQ PAPERWORK. SHE WILL ASK ONE OF HER OTHER DOCTORS TO COMPLETE THIS.

## 2020-11-10 ENCOUNTER — HOSPITAL ENCOUNTER (OUTPATIENT)
Dept: CT IMAGING | Facility: HOSPITAL | Age: 50
Discharge: HOME | End: 2020-11-10
Admitting: NURSE PRACTITIONER

## 2020-11-10 DIAGNOSIS — R91.1 LUNG NODULE: ICD-10-CM

## 2020-11-10 PROCEDURE — 71250 CT THORAX DX C-: CPT

## 2020-11-10 NOTE — PROGRESS NOTES
Please call the patient regarding her CT result. The nodule appears to be the same/similar size as previous CT when the CT's are compared so we will monitor in again with another CT in 6-8 months.

## 2020-11-16 ENCOUNTER — APPOINTMENT (OUTPATIENT)
Dept: CT IMAGING | Facility: HOSPITAL | Age: 50
End: 2020-11-16

## 2020-12-17 DIAGNOSIS — IMO0002 UNCONTROLLED TYPE 2 DIABETES MELLITUS WITH COMPLICATION, WITH LONG-TERM CURRENT USE OF INSULIN: ICD-10-CM

## 2020-12-17 RX ORDER — INSULIN ASPART 100 [IU]/ML
INJECTION, SUSPENSION SUBCUTANEOUS
Refills: 2 | OUTPATIENT
Start: 2020-12-17

## 2020-12-17 NOTE — TELEPHONE ENCOUNTER
Roseanne,   Can you see if patient needs refill? She has not been seen by me since 09/2019. Okay to refill if she wants to make an appointment with me.   Thanks  MD

## 2021-04-22 ENCOUNTER — OFFICE VISIT (OUTPATIENT)
Dept: PULMONOLOGY | Facility: CLINIC | Age: 51
End: 2021-04-22

## 2021-04-22 VITALS
OXYGEN SATURATION: 97 % | HEART RATE: 89 BPM | WEIGHT: 293 LBS | BODY MASS INDEX: 45.99 KG/M2 | DIASTOLIC BLOOD PRESSURE: 82 MMHG | RESPIRATION RATE: 16 BRPM | HEIGHT: 67 IN | SYSTOLIC BLOOD PRESSURE: 122 MMHG

## 2021-04-22 DIAGNOSIS — E66.01 MORBID OBESITY, UNSPECIFIED OBESITY TYPE (HCC): ICD-10-CM

## 2021-04-22 DIAGNOSIS — R91.1 LUNG NODULE: ICD-10-CM

## 2021-04-22 DIAGNOSIS — G47.33 OSA (OBSTRUCTIVE SLEEP APNEA): Primary | ICD-10-CM

## 2021-04-22 DIAGNOSIS — J45.20 MILD INTERMITTENT ASTHMA WITHOUT COMPLICATION: ICD-10-CM

## 2021-04-22 DIAGNOSIS — J30.89 OTHER ALLERGIC RHINITIS: ICD-10-CM

## 2021-04-22 DIAGNOSIS — R93.89 ABNORMAL CT OF THE CHEST: ICD-10-CM

## 2021-04-22 PROCEDURE — 99214 OFFICE O/P EST MOD 30 MIN: CPT | Performed by: INTERNAL MEDICINE

## 2021-04-22 RX ORDER — TAPENTADOL HYDROCHLORIDE 75 MG/1
TABLET, FILM COATED ORAL
COMMUNITY
Start: 2021-04-15

## 2021-04-22 RX ORDER — LOVASTATIN 20 MG/1
20 TABLET ORAL DAILY
COMMUNITY
Start: 2021-01-28

## 2021-04-22 RX ORDER — DICYCLOMINE HCL 20 MG
20 TABLET ORAL 3 TIMES DAILY PRN
COMMUNITY
Start: 2021-01-28 | End: 2022-08-26

## 2021-04-22 RX ORDER — AZELASTINE 1 MG/ML
1 SPRAY, METERED NASAL 2 TIMES DAILY PRN
Qty: 1 EACH | Refills: 5 | Status: SHIPPED | OUTPATIENT
Start: 2021-04-22

## 2021-04-22 NOTE — PROGRESS NOTES
"  Chief Complaint   Patient presents with   • Follow-up   • Sleeping Problem       Subjective   Azalea Mendoza is a 50 y.o. female.     History of Present Illness   Patient was evaluated today for follow up of Sleep apnea.     Patient doesn't report any issues with the PAP device. The patient describes no significant issues with her mask either.     Patient says that the compliance with the use of the equipment is good.     Patient says that her symptoms of fatigue & daytime sleepiness have been helped greatly with the use of PAP, as prescribed.     Patient says that she has not been using her nasal sprays on a seasonal basis and is noticing worsening nasal congestion as well as occasional runny nose.    She also has lung nodules.     The following portions of the patient's history were reviewed and updated as appropriate: allergies, current medications, past family history, past medical history, past social history and past surgical history.    Review of Systems   Constitutional: Negative for chills and fever.   HENT: Positive for rhinorrhea, sinus pressure and sneezing. Negative for sore throat.    Respiratory: Positive for cough. Negative for shortness of breath and wheezing.    Psychiatric/Behavioral: Negative for sleep disturbance.       Objective   Visit Vitals  /82   Pulse 89   Resp 16   Ht 170.2 cm (67\")   Wt (!) 174 kg (383 lb)   SpO2 97%   BMI 59.99 kg/m²       Physical Exam  Vitals reviewed.   Constitutional:       Appearance: She is well-developed.   HENT:      Head: Atraumatic.      Mouth/Throat:      Comments: Oropharynx was crowded.  Neck:      Comments: Increased adipose tissue noted.  Musculoskeletal:      Comments: Gait was normal.   Neurological:      Mental Status: She is alert and oriented to person, place, and time.           Assessment/Plan   Diagnoses and all orders for this visit:    1. LUCERO (obstructive sleep apnea) (Primary)    2. Morbid obesity, unspecified obesity type " (CMS/[facility])    3. Other allergic rhinitis  -     Ambulatory Referral to Allergy    4. Mild intermittent asthma without complication  -     Ambulatory Referral to Allergy    5. Lung nodule  -     CT Chest Without Contrast Diagnostic; Future    6. Abnormal CT of the chest  -     CT Chest Without Contrast Diagnostic; Future    Other orders  -     azelastine (ASTELIN) 0.1 % nasal spray; 1 spray into the nostril(s) as directed by provider 2 (Two) Times a Day As Needed for Rhinitis or Allergies. Use in each nostril as directed  Dispense: 1 each; Refill: 5           Return in about 3 months (around 7/22/2021) for Recheck, Imaging, For Shantel, ....Also 9 mths w/ Dr. Schneider.    DISCUSSION (if any):  I reviewed the results of last sleep study in detail. I informed her that the apnea hypopnea index was 27 / hour. This was an in lab study. This was done in 2010.    Continue treatment with AutoPAP at a pressure of 10/15, with nasal pillows.    Patient seems to be compliant with PAP device, based on the available data and her account of improved symptoms.     Compliance data was obtained from the patient's device/DME company and it was reviewed in detail with the patient.    Sleep hygiene measures were discussed in great detail including need to follow a strict bedtime and to avoid electronic devices in bed and close to bedtime.    We also discussed the need to avoid unscheduled naps as that can have adverse effects on sleep efficiency at night.    she was also asked to avoid caffeinated or alcoholic beverages within 4 to 6 hours of bedtime.    Weight loss advised.    The patient was once again reminded to continue using the PAP device regularly, every night for atleast 4 hours.    Patient was advised to start using nasal spray on a seasonal basis, especially since her symptoms are consistent with likely poorly controlled allergic rhinitis.    The CT will be repeated within the next few days.    If the nodule remains stable then a  further CT will be done in 8-10 months from the next CT.     Due to multiple allergies and a history of anaphylaxis, in this patient with mild asthma, I will refer her to Dr. Branch.       Dictated utilizing Dragon dictation.    This document was electronically signed by Rafy Schneider MD on 04/22/21 at 13:54 EDT

## 2021-04-29 ENCOUNTER — HOSPITAL ENCOUNTER (OUTPATIENT)
Dept: CT IMAGING | Facility: HOSPITAL | Age: 51
Discharge: HOME | End: 2021-04-29
Admitting: INTERNAL MEDICINE

## 2021-04-29 DIAGNOSIS — R93.89 ABNORMAL CT OF THE CHEST: ICD-10-CM

## 2021-04-29 DIAGNOSIS — R91.1 LUNG NODULE: ICD-10-CM

## 2021-04-29 PROCEDURE — 71250 CT THORAX DX C-: CPT

## 2021-08-10 ENCOUNTER — OFFICE VISIT (OUTPATIENT)
Dept: PULMONOLOGY | Facility: CLINIC | Age: 51
End: 2021-08-10

## 2021-08-10 VITALS
RESPIRATION RATE: 16 BRPM | OXYGEN SATURATION: 98 % | HEART RATE: 88 BPM | WEIGHT: 293 LBS | SYSTOLIC BLOOD PRESSURE: 118 MMHG | HEIGHT: 67 IN | BODY MASS INDEX: 45.99 KG/M2 | DIASTOLIC BLOOD PRESSURE: 72 MMHG

## 2021-08-10 DIAGNOSIS — J45.20 MILD INTERMITTENT ASTHMA WITHOUT COMPLICATION: ICD-10-CM

## 2021-08-10 DIAGNOSIS — R91.1 LUNG NODULE: ICD-10-CM

## 2021-08-10 DIAGNOSIS — G47.33 OSA (OBSTRUCTIVE SLEEP APNEA): Primary | ICD-10-CM

## 2021-08-10 DIAGNOSIS — E66.01 MORBID OBESITY, UNSPECIFIED OBESITY TYPE (HCC): ICD-10-CM

## 2021-08-10 PROCEDURE — 99214 OFFICE O/P EST MOD 30 MIN: CPT | Performed by: NURSE PRACTITIONER

## 2021-08-10 NOTE — PROGRESS NOTES
"Chief Complaint   Patient presents with   • Follow-up   • Sleeping Problem         Subjective   Azalea Mendoza is a 50 y.o. female.     History of Present Illness   Patient comes back today for follow up of Obstructive Sleep apnea, shortness of breath, and lung nodule.    Patient says that she is compliant with her device and using it regularly.    Patient's symptoms of sleep disturbance and daytime sleepiness have been helped greatly with the use of PAP device, as prescribed.  She has always used nasal pillows and feels this is the most comfortable mask however she seems to have had a lot of leak she feels recently.    She did not make the appointment that was scheduled with Dr. Branch.    She is using Dulera as needed and is not using the rescue inhaler at all.    She is using Astelin as needed and feels it helps.      The following portions of the patient's history were reviewed and updated as appropriate: allergies, current medications, past family history, past medical history, past social history and past surgical history.      Review of Systems   Constitutional: Negative for chills and fever.   HENT: Negative for rhinorrhea, sinus pressure, sneezing and sore throat.    Respiratory: Negative for cough, shortness of breath and wheezing.    Psychiatric/Behavioral: Positive for sleep disturbance.       Objective   Visit Vitals  /72   Pulse 88   Resp 16   Ht 170.2 cm (67\")   Wt (!) 168 kg (370 lb)   SpO2 98%   BMI 57.95 kg/m²       Physical Exam  Vitals reviewed.   HENT:      Head: Atraumatic.      Mouth/Throat:      Mouth: Mucous membranes are moist.      Comments: Crowded oropharynx.   Eyes:      Extraocular Movements: Extraocular movements intact.   Neck:      Comments: Increased adipose tissue.  Cardiovascular:      Rate and Rhythm: Normal rate and regular rhythm.   Pulmonary:      Effort: Pulmonary effort is normal. No respiratory distress.      Comments: Somewhat decreased A/E without wheezing. "   Abdominal:      Comments: Obese abdomen.   Musculoskeletal:      Cervical back: Neck supple.      Comments: Gait normal.   Skin:     General: Skin is warm.   Neurological:      Mental Status: She is alert and oriented to person, place, and time.             Assessment/Plan   Diagnoses and all orders for this visit:    1. LUCERO (obstructive sleep apnea) (Primary)    2. Morbid obesity, unspecified obesity type (CMS/HCC)    3. Lung nodule  -     CT Chest Without Contrast Diagnostic; Future    4. Mild intermittent asthma without complication           Return for keep appt in January.    DISCUSSION (if any):  Continue treatment with AutoPAP at a pressure of 10/15, with a nasal pillows.    Patient's compliance data was reviewed and the compliance is 100%.    Since she felt like the mask is suddenly leaking more than usual I have asked her to try tightening the mask or maybe trying a firmer pillow.    Humidification setup, hose and mask care discussed.    Weight loss advised.    Use every night for at least 4 hours stressed.    As far as the asthma is concerned her symptoms seem to be stable.  I have asked her to discontinue Dulera since she has not been using it on a regular basis.  If her symptoms worsen she should resume using Dulera twice a day.    Reviewed her CT scan which shows a stable 9 mm nodule.  She will be due for a repeat CT December 2021 and if this is stable that will have been 2 years of stability.    Dictated utilizing Dragon dictation.    This document was electronically signed by JARRET Robert August 10, 2021  14:20 EDT

## 2021-09-10 ENCOUNTER — TELEPHONE (OUTPATIENT)
Dept: ORTHOPEDIC SURGERY | Facility: CLINIC | Age: 51
End: 2021-09-10

## 2021-09-10 NOTE — TELEPHONE ENCOUNTER
Provider: SARAH  Caller: CARL  Phone Number: [phone]  Reason for Call: PT LEFT PCP AND HE WAS THINKING IT MIGHT BE A SOFT TISSUE INFECTION AND WANTED TO KNOW IF YOU NEED TO GET HER IN SOONER KNOWING THAT    IF SEEING THE PA GETS HER IN FASTER SHE HAPPY TO DO THAT TOO     ATTEMPTED TO WARM TRANSFER

## 2021-09-10 NOTE — TELEPHONE ENCOUNTER
Patient's PCP prescribed antibiotics and I advised her to get these filled asap. I moved up appointment to Monday and advised diabetic foot precautions.

## 2021-09-13 ENCOUNTER — OFFICE VISIT (OUTPATIENT)
Dept: ORTHOPEDIC SURGERY | Facility: CLINIC | Age: 51
End: 2021-09-13

## 2021-09-13 VITALS
WEIGHT: 293 LBS | BODY MASS INDEX: 45.99 KG/M2 | DIASTOLIC BLOOD PRESSURE: 94 MMHG | HEART RATE: 95 BPM | HEIGHT: 67 IN | SYSTOLIC BLOOD PRESSURE: 182 MMHG

## 2021-09-13 DIAGNOSIS — E66.01 OBESITY, MORBID, BMI 50 OR HIGHER (HCC): ICD-10-CM

## 2021-09-13 DIAGNOSIS — E11.65 UNCONTROLLED TYPE 2 DIABETES MELLITUS WITH HYPERGLYCEMIA (HCC): ICD-10-CM

## 2021-09-13 DIAGNOSIS — M79.671 RIGHT FOOT PAIN: Primary | ICD-10-CM

## 2021-09-13 PROCEDURE — 99213 OFFICE O/P EST LOW 20 MIN: CPT | Performed by: ORTHOPAEDIC SURGERY

## 2021-09-13 RX ORDER — FLUTICASONE PROPIONATE 50 MCG
SPRAY, SUSPENSION (ML) NASAL
COMMUNITY
Start: 2021-09-10 | End: 2022-08-26

## 2021-09-13 RX ORDER — ESCITALOPRAM OXALATE 20 MG/1
TABLET ORAL
COMMUNITY
Start: 2021-09-05

## 2021-09-13 RX ORDER — CLINDAMYCIN HYDROCHLORIDE 300 MG/1
CAPSULE ORAL
COMMUNITY
Start: 2021-09-10 | End: 2022-02-28

## 2021-09-13 NOTE — PROGRESS NOTES
NEW PATIENT    Patient: Azalea Mendoza  : 1970    Primary Care Provider: Nallely Frazier MD    Requesting Provider: As above    Pain of the Right Foot (Swelling,  Hx of Uncontolled DM type 2 (last appt ))      History    Chief Complaint: Right foot pain    History of Present Illness: This is a 50-year-old woman who I am seen in the past for bursitis.  She has longstanding significant knee arthritis.  She has diabetes with extremely poor glucose control.  She reports her most recent hemoglobin A1c was greater than 10.  I discussed this with her very frankly and explained the risks of amputation due to neuropathy.  (I took care of her mother-in-law who was diabetic and I did several amputations prior to her demise) I strongly recommend she improve the glucose control.  She is here with a new problem.  About a week ago she developed swelling and redness in the right first MTPJ.  She did not have any open wound.  She did not have any injury.  She does not have any personal history of gout, no family history of gout.  She saw Dr. Frazier her primary physician and was started on antibiotics.  She is taking clindamycin.  The pain and swelling and erythema have significantly improved.  She did not have any fever no chills.  Current Outpatient Medications on File Prior to Visit   Medication Sig Dispense Refill   • albuterol sulfate HFA (PROAIR HFA) 108 (90 Base) MCG/ACT inhaler Every 4 (Four) Hours.     • apixaban (ELIQUIS) 5 MG tablet tablet Take 1 tablet by mouth Every 12 (Twelve) Hours. 60 tablet 0   • azelastine (ASTELIN) 0.1 % nasal spray 1 spray into the nostril(s) as directed by provider 2 (Two) Times a Day As Needed for Rhinitis or Allergies. Use in each nostril as directed 1 each 5   • cetirizine (zyrTEC) 10 MG tablet Take 1 tablet (10 mg total) by mouth Every Night 90 tablet 3   • Continuous Blood Gluc Sensor (DEXCOM G6 SENSOR) Every 10 (Ten) Days. 3 each 11   • Continuous Blood Gluc  Transmit (DEXCOM G6 TRANSMITTER) misc 1 each Every 3 (Three) Months. 1 each 4   • dicyclomine (BENTYL) 20 MG tablet Take 20 mg by mouth 3 (Three) Times a Day As Needed.     • diltiaZEM (TIAZAC) 360 MG 24 hr capsule Take 360 mg by mouth Daily.     • EPIPEN 2-DAVID 0.3 MG/0.3ML solution auto-injector injection USE AS INSTRUCTED 2 each 1   • Gel Base gel PRILOCAINE 2% LIDOCAINE 10% IMIPRAMINE 3% CAPSAICIN 0.001% MANNITOL 20%. APPLY 1-2 G TO AFFECTED AREA 3-4 TIMES DAILY 240 g PRN   • glucose blood test strip 1 each by Other route 3 (Three) Times a Day Use as instructed 300 each 3   • Insulin Pen Needle (PEN NEEDLES) 32G X 4 MM misc 1 each 2 (Two) Times a Day Before Meals. 200 each 3   • losartan (COZAAR) 100 MG tablet Take 1 tablet (100 mg total) by mouth Daily 90 tablet 3   • lovastatin (MEVACOR) 20 MG tablet Take 20 mg by mouth Daily.     • metFORMIN ER (GLUCOPHAGE-XR) 500 MG 24 hr tablet Take 1 tablet by mouth 2 (Two) Times a Day Before Meals. 180 tablet 3   • Mometasone Furo-Formoterol Fum (DULERA IN) Dulera     • NOVOLOG MIX 70/30 FLEXPEN (70-30) 100 UNIT/ML suspension pen-injector injection 40 units before breakfast and 40 units before dinner 10 pen 5   • Nucynta 75 MG tablet TAKE 1 TABLET BY MOUTH TWICE A DAY   4/11/21     • omeprazole (priLOSEC) 20 MG capsule TAKE ONE CAPSULE BY MOUTH ONCE DAILY 90 capsule 1   • PREMARIN 0.625 MG/GM vaginal cream      • tolterodine LA (DETROL LA) 4 MG 24 hr capsule TAKE 1 CAPSULE BY MOUTH EVERY DAY 90 capsule 1   • triamterene-hydrochlorothiazide (MAXZIDE-25) 37.5-25 MG per tablet Take 1 tablet by mouth Daily. 30 tablet 0   • Vitamin D, Cholecalciferol, 1000 units tablet Take 1,000 Units by mouth Daily.     • [DISCONTINUED] escitalopram (LEXAPRO) 10 MG tablet Take 10 mg by mouth 2 (Two) Times a Day.  2   • clindamycin (CLEOCIN) 300 MG capsule      • escitalopram (LEXAPRO) 20 MG tablet      • fluticasone (FLONASE) 50 MCG/ACT nasal spray        No current facility-administered  "medications on file prior to visit.      Allergies   Allergen Reactions   • Fish Allergy Anaphylaxis, Itching and Nausea And Vomiting   • Pravastatin Myalgia   • Shellfish Allergy Anaphylaxis   • Nuts Other (See Comments)     Based on allergy testing, avoids pecans/walnuts, but tolerates peanuts/PB w/out issue   • Ciprofloxacin-Dexamethasone Rash     Otic drops caused rash around the ears, but tolerates Cipro PO w/out issue   • Neomycin-Bacitracin Zn-Polymyx Rash   • Penicillins Palpitations     Keflex OK       Past Medical History:   Diagnosis Date   • A-fib (CMS/HCC)     follows w/ Dr. Chau, on Eliquis   • Allergic rhinitis due to pollen    • Anxiety    • Asthma     mild, prn inhalers only, follows w/ Dr. Schneider    • Back pain, lumbosacral    • Cervicalgia    • Diabetic neuropathy (CMS/HCC)    • DM2 (diabetes mellitus, type 2) (CMS/HCC)     dx 2005, on insulin 2+years, follows w/ Endocrinology, A1C >9   • Dyslipidemia    • Dyspepsia    • Dyspnea on exertion    • Fatigue    • Gallstones    • GERD (gastroesophageal reflux disease)     daily Omeprazole 20mg, controlled, denies recent EGD    • H/O cystoscopy     diagnostic cystoscopy   • Heart murmur    • Hyperparathyroidism (CMS/HCC)     s/p (R) parathyroidectomy 9/2019 @[facility]   • Hypertension    • IBS (irritable bowel syndrome)     exacerbated w/ salads and/or greasy foods, does not follow w/ GI   • Lump or mass in breast     last mammogram 1/2019 OK   • Lung nodule     RUL, 8mm, repeat Chest CT planned 12/2019   • Morbid obesity with BMI of 50.0-59.9, adult (CMS/HCC)    • Muscle spasm    • Osteoarthritis     takes prn Tylenol, denies NSAIDS/steroids   • Ovarian cancer (CMS/HCC)     dx 2000, s/p TAHBSO, no chemo/XRT   • Overactive bladder     on Detrol   • Primary hyperparathyroidism (CMS/HCC) 6/11/2018   • Right knee pain     pursuing \"nerve block\" w/ pain management, avoids NSAIDS/steroids   • Shingles    • Sleep apnea     CPAP compliant   • Thyroid nodule     " follows w/ Dr. Yin @, conservative management     Past Surgical History:   Procedure Laterality Date   • COLONOSCOPY  2013   • HX OVARIAN CYSTECTOMY  1997    (open) lower transverse incision   • LAPAROSCOPIC OVARIAN CYSTECTOMY  2000    w/ CA dx   • PARATHYROIDECTOMY  09/22/2019    (R) parathyroidectomy w/ Dr. Yin @    • TOTAL ABDOMINAL HYSTERECTOMY WITH SALPINGO OOPHORECTOMY  09/2000    ovarian CA     Family History   Problem Relation Age of Onset   • Hypertension Mother    • Arthritis Mother    • Arthritis Father    • Hypertension Father    • Hyperlipidemia Father    • Diabetes Father         type 2   • Stroke Father    • Obesity Father    • Cervical cancer Maternal Grandmother    • Heart attack Maternal Grandfather    • Leukemia Paternal Grandmother    • Alzheimer's disease Paternal Grandfather       Social History     Socioeconomic History   • Marital status:      Spouse name: Not on file   • Number of children: Not on file   • Years of education: Not on file   • Highest education level: Not on file   Tobacco Use   • Smoking status: Never Smoker   • Smokeless tobacco: Never Used   Substance and Sexual Activity   • Alcohol use: No   • Drug use: No   • Sexual activity: Defer        Review of Systems   Constitutional: Positive for fatigue. Negative for activity change, appetite change, chills, diaphoresis, fever and unexpected weight change.   HENT: Positive for sinus pressure. Negative for congestion, dental problem, drooling, ear discharge, ear pain, facial swelling, hearing loss, mouth sores, nosebleeds, postnasal drip, rhinorrhea, sneezing, sore throat, tinnitus, trouble swallowing and voice change.    Eyes: Negative for photophobia, pain, discharge, redness, itching and visual disturbance.   Respiratory: Negative for apnea, cough, choking, chest tightness, shortness of breath, wheezing and stridor.    Cardiovascular: Positive for leg swelling. Negative for chest pain and palpitations.  "  Gastrointestinal: Negative for abdominal distention, abdominal pain, anal bleeding, blood in stool, constipation, diarrhea, nausea, rectal pain and vomiting.   Endocrine: Negative for cold intolerance, heat intolerance, polydipsia, polyphagia and polyuria.   Genitourinary: Negative for decreased urine volume, difficulty urinating, dysuria, enuresis, flank pain, frequency, genital sores, hematuria and urgency.   Musculoskeletal: Positive for arthralgias and joint swelling. Negative for back pain, gait problem, myalgias, neck pain and neck stiffness.   Skin: Negative for color change, pallor, rash and wound.   Allergic/Immunologic: Positive for environmental allergies and food allergies. Negative for immunocompromised state.   Neurological: Negative for dizziness, tremors, seizures, syncope, facial asymmetry, speech difficulty, weakness, light-headedness, numbness and headaches.   Hematological: Negative for adenopathy. Does not bruise/bleed easily.   Psychiatric/Behavioral: Negative for agitation, behavioral problems, confusion, decreased concentration, dysphoric mood, hallucinations, self-injury, sleep disturbance and suicidal ideas. The patient is not nervous/anxious and is not hyperactive.        The following portions of the patient's history were reviewed and updated as appropriate: allergies, current medications, past family history, past medical history, past social history, past surgical history and problem list.    Physical Exam:   BP (!) 182/94   Pulse 95   Ht 170.2 cm (67.01\")   Wt (!) 164 kg (361 lb 12.8 oz)   BMI 56.65 kg/m²   GENERAL: Body habitus: morbidly obese    Lower extremity edema: Right: trace; Left: trace    Varicose veins:  Right: mild; Left: mild    Gait: antalgic with the first few steps     Mental Status:  awake and alert; oriented to person, place, and time    Voice:  clear  SKIN:  Lower extremity: Normal    Hair Growth(lower extremity):  Right:normal; Left:  normal  NAILS: Toenails: " normal  HEENT: Head: Normocephalic, atraumatic,  without obvious abnormality.  eye: normal external eye, no icterus  ears:normal external ears  PULM:  Repiratory effort normal  CV:  Dorsalis Pedis:  Right: 2+; Left:2+    Posterior Tibial: Right:2+; Left:2+    Capillary Refill:  Brisk  MSK:  Hand:right handed      Tibia:  Right:  non tender; Left:  non tender      Ankle:  Right: non tender; Left:  non tender      Foot:  Right:  Mildly tender over the medial aspect of the first MTPJ, but no erythema that I can see today, and only possibly trace swelling, range of motion is 30 degrees dorsiflexion plantarflexion of the great toe, with some soreness at the extremes, no pain with a grind test, no other tenderness; Left:  non tender      NEURO:      [place] 5.07 monofilament test: patchy decrease    Lower extremity sensation: diminished            Medical Decision Making    Data Review:   ordered and reviewed x-rays today and reviewed prior lab results    Assessment and Plan/ Diagnosis/Treatment options:   1. Right foot pain  I explained that with that history of redness and swelling I would similarly have had infection versus gout in the differential diagnosis.  I explained if this were gout it probably would not have resolved the way it has taken my antibiotic.  I think this probably was cellulitis, likely from some tiny nick in the skin.  I do not see any signs of any internal infection or hematogenous infection.  It has resolved very quickly with the antibiotics.  She is only minimally tender now and I do not really see any significant swelling.  I would recommend she continue the clindamycin until the prescription is finished.  I will be happy to see her again if anything recurs.  I reassured her I do not see any fracture on x-ray.  Again I think this was cellulitis, if it were gout I would expect it not to have resolved so well.  I will be happy to see her anytime.    - XR Foot 2 View Right    2.  Uncontrolled type 2 diabetes mellitus with hyperglycemia (CMS/[facility])  As above I very frankly discussed the importance of glucose control with her.    3. Obesity, morbid, BMI 50 or higher (CMS/[facility])  We also frankly discussed weight loss.  Her BMI is 56.65            Radiology Ordered []  Radiology Reports Reviewed []      Radiology Images Reviewed []   Labs Reviewed []    Labs Ordered []   PCP Records Reviewed []    Provider Records Reviewed []    ER Records Reviewed []    Hospital Records Reviewed []    History Obtained From Family []    Phone conversation with Provider []    Records Requested []        Romina Sanchez MD

## 2021-11-15 NOTE — TELEPHONE ENCOUNTER
I will not be able to order her te hep a vaccine , but I can set her up with allergist [ she may have seen one ] for consult   Wartpeel Counseling:  I discussed with the patient the risks of Wartpeel including but not limited to erythema, scaling, itching, weeping, crusting, and pain.

## 2022-01-03 ENCOUNTER — HOSPITAL ENCOUNTER (OUTPATIENT)
Dept: CT IMAGING | Facility: HOSPITAL | Age: 52
Discharge: HOME | End: 2022-01-03
Admitting: NURSE PRACTITIONER

## 2022-01-03 DIAGNOSIS — R91.1 LUNG NODULE: ICD-10-CM

## 2022-01-03 PROCEDURE — 71250 CT THORAX DX C-: CPT

## 2022-01-26 ENCOUNTER — LAB (OUTPATIENT)
Dept: LAB | Facility: HOSPITAL | Age: 52
End: 2022-01-26

## 2022-01-26 DIAGNOSIS — Z03.818 ENCOUNTER FOR PATIENT CONCERN ABOUT EXPOSURE TO INFECTIOUS ORGANISM: Primary | ICD-10-CM

## 2022-01-26 LAB — SARS-COV-2 RNA NOSE QL NAA+PROBE: NOT DETECTED

## 2022-01-26 PROCEDURE — U0004 COV-19 TEST NON-CDC HGH THRU: HCPCS

## 2022-02-25 DIAGNOSIS — U07.1 COVID-19: ICD-10-CM

## 2022-02-25 RX ORDER — ALBUTEROL SULFATE 90 UG/1
AEROSOL, METERED RESPIRATORY (INHALATION)
OUTPATIENT
Start: 2022-02-25

## 2022-03-17 ENCOUNTER — TELEPHONE (OUTPATIENT)
Dept: URGENT CARE | Facility: CLINIC | Age: 52
End: 2022-03-17

## 2022-03-17 DIAGNOSIS — T78.40XA ALLERGIC REACTION, INITIAL ENCOUNTER: ICD-10-CM

## 2022-03-17 RX ORDER — FAMOTIDINE 40 MG/1
TABLET, FILM COATED ORAL
Qty: 30 TABLET | Refills: 0 | Status: SHIPPED | OUTPATIENT
Start: 2022-03-17

## 2022-06-15 ENCOUNTER — OFFICE VISIT (OUTPATIENT)
Dept: PULMONOLOGY | Facility: CLINIC | Age: 52
End: 2022-06-15

## 2022-06-15 VITALS
HEART RATE: 99 BPM | OXYGEN SATURATION: 98 % | HEIGHT: 67 IN | RESPIRATION RATE: 18 BRPM | WEIGHT: 293 LBS | DIASTOLIC BLOOD PRESSURE: 74 MMHG | SYSTOLIC BLOOD PRESSURE: 128 MMHG | BODY MASS INDEX: 45.99 KG/M2

## 2022-06-15 DIAGNOSIS — J45.20 MILD INTERMITTENT ASTHMA WITHOUT COMPLICATION: ICD-10-CM

## 2022-06-15 DIAGNOSIS — G47.33 OSA (OBSTRUCTIVE SLEEP APNEA): Primary | ICD-10-CM

## 2022-06-15 DIAGNOSIS — E66.01 MORBID OBESITY, UNSPECIFIED OBESITY TYPE: ICD-10-CM

## 2022-06-15 DIAGNOSIS — J30.89 OTHER ALLERGIC RHINITIS: ICD-10-CM

## 2022-06-15 PROCEDURE — 99214 OFFICE O/P EST MOD 30 MIN: CPT | Performed by: NURSE PRACTITIONER

## 2022-06-15 RX ORDER — RIVAROXABAN 20 MG/1
20 TABLET, FILM COATED ORAL DAILY
COMMUNITY
Start: 2022-04-16 | End: 2023-01-05 | Stop reason: SDUPTHER

## 2022-06-15 NOTE — PROGRESS NOTES
"Chief Complaint   Patient presents with   • Follow-up   • Sleep Apnea         Subjective   Azalea Mnedoza is a 51 y.o. female.     History of Present Illness   Patient is here today for follow up of asthma and shortness of breath.     She was positive for COVID-19 virus in February and developed pneumonia however she states it was caught early and she recovered fairly quickly.  Her daughter and has been more positive as well.    Her asthma has been stable otherwise.     The patient says that she is compliant with pulmonary medicines, as prescribed. She does not have to use the rescue inhaler on a daily basis.     She uses the flonase daily.     Patient says that she is compliant with her device and using it regularly.    Patient's symptoms of sleep disturbance and daytime sleepiness have been helped greatly with the use of PAP device, as prescribed. She feels rested most days upon awakening.         The following portions of the patient's history were reviewed and updated as appropriate: allergies, current medications, past family history, past medical history, past social history and past surgical history.    Review of Systems   HENT: Negative for postnasal drip, sinus pressure, sinus pain and sore throat.    Respiratory: Positive for apnea. Negative for cough, chest tightness, shortness of breath and wheezing.    All other systems reviewed and are negative.      Objective   Visit Vitals  /74 (BP Location: Right arm, Patient Position: Sitting, Cuff Size: Adult)   Pulse 99   Resp 18   Ht 170.2 cm (67\")   Wt (!) 172 kg (379 lb 3.2 oz)   SpO2 98%   BMI 59.39 kg/m²         Physical Exam  Vitals reviewed.   HENT:      Head: Atraumatic.      Mouth/Throat:      Mouth: Mucous membranes are moist.      Comments: Crowded oropharynx.   Eyes:      Extraocular Movements: Extraocular movements intact.   Neck:      Comments: Increased adipose tissue.   Cardiovascular:      Rate and Rhythm: Normal rate and regular " rhythm.   Pulmonary:      Effort: Pulmonary effort is normal. No respiratory distress.   Abdominal:      Comments: Obese abdomen.   Musculoskeletal:      Cervical back: Neck supple.      Comments: Gait normal.   Skin:     General: Skin is warm.   Neurological:      Mental Status: She is alert and oriented to person, place, and time.         Assessment & Plan   Diagnoses and all orders for this visit:    1. LUCERO (obstructive sleep apnea) (Primary)    2. Morbid obesity, unspecified obesity type (HCC)    3. Mild intermittent asthma without complication    4. Other allergic rhinitis           Return in about 8 months (around 2/15/2023) for Recheck, For Dr. Schneider.    DISCUSSION (if any):  Her symptoms of asthma are under adequate control at this time. Continue rescue inhaler as needed.     Continue flonase daily.     Patient's medications for underlying asthma were reviewed with her in great detail.    Any needed adjustments to her pulmonary medications, either for clinical or insurance coverage reasons, have been made and are reflected in the orders.    Side effects of prescribed medications discussed with the patient.    Asthma action plan with discussed with her.    The patient was asked to call this office if the symptoms worsen.    I reviewed her last Ct. It has been stable for 2 years and will not need to continue to follow.     Study Result    Narrative & Impression      PROCEDURE: CT CHEST WO CONTRAST DIAGNOSTIC-     HISTORY: Lung nodule, > 8mm; R91.1-Solitary pulmonary nodule     COMPARISON: 04/29/2021 and 12/20/2019     TECHNIQUE: Axial CT without IV contrast administration.     FINDINGS:     No acute lung disease is present.  Nodule of the right middle lobe is  seen measuring 9 mm unchanged. No new pulmonary lesion is present.     No pleural or pericardial effusion is seen. No enlarged lymph nodes are  present. Massive left thyroid gland is again noted.. Images of the upper  abdomen show fatty infiltration of  the liver.     IMPRESSION:  1. Stable appearance of right middle lobe nodule for greater than 2  years. This is a strong indicator of benign etiology.        Continue treatment with AutoPAP at a pressure of 10/15, with a full-face mask.    Patient's compliance data was reviewed and the compliance is greater than 90%.    Humidification setup, hose and mask care discussed.    Weight loss advised.    Use every night for at least 4 hours stressed.      Dictated utilizing Dragon dictation.    This document was electronically signed by JARRET Robert Mirta 15, 2022  10:20 EDT

## 2022-08-26 PROBLEM — R05.8 COUGH WITH CONGESTION OF PARANASAL SINUS: Status: ACTIVE | Noted: 2022-08-26

## 2022-08-26 PROBLEM — R09.81 COUGH WITH CONGESTION OF PARANASAL SINUS: Status: ACTIVE | Noted: 2022-08-26

## 2022-08-26 PROCEDURE — U0004 COV-19 TEST NON-CDC HGH THRU: HCPCS | Performed by: NURSE PRACTITIONER

## 2022-10-31 ENCOUNTER — OFFICE VISIT (OUTPATIENT)
Dept: CARDIOLOGY | Facility: HOSPITAL | Age: 52
End: 2022-10-31

## 2022-10-31 ENCOUNTER — HOSPITAL ENCOUNTER (OUTPATIENT)
Dept: CARDIOLOGY | Facility: HOSPITAL | Age: 52
Discharge: HOME | End: 2022-10-31

## 2022-10-31 VITALS
RESPIRATION RATE: 18 BRPM | SYSTOLIC BLOOD PRESSURE: 141 MMHG | TEMPERATURE: 96.4 F | WEIGHT: 293 LBS | HEART RATE: 98 BPM | OXYGEN SATURATION: 97 % | HEIGHT: 67 IN | DIASTOLIC BLOOD PRESSURE: 79 MMHG | BODY MASS INDEX: 45.99 KG/M2

## 2022-10-31 DIAGNOSIS — I48.0 PAF (PAROXYSMAL ATRIAL FIBRILLATION): ICD-10-CM

## 2022-10-31 DIAGNOSIS — I10 BENIGN ESSENTIAL HYPERTENSION: ICD-10-CM

## 2022-10-31 DIAGNOSIS — E78.2 MIXED HYPERLIPIDEMIA: ICD-10-CM

## 2022-10-31 DIAGNOSIS — I48.0 PAF (PAROXYSMAL ATRIAL FIBRILLATION): Primary | ICD-10-CM

## 2022-10-31 DIAGNOSIS — E66.01 MORBID OBESITY: ICD-10-CM

## 2022-10-31 LAB
QT INTERVAL: 352 MS
QTC INTERVAL: 442 MS

## 2022-10-31 PROCEDURE — 93010 ELECTROCARDIOGRAM REPORT: CPT | Performed by: INTERNAL MEDICINE

## 2022-10-31 PROCEDURE — 93005 ELECTROCARDIOGRAM TRACING: CPT | Performed by: NURSE PRACTITIONER

## 2022-10-31 PROCEDURE — 99214 OFFICE O/P EST MOD 30 MIN: CPT | Performed by: NURSE PRACTITIONER

## 2022-10-31 NOTE — PROGRESS NOTES
"Chief Complaint  Establish Care, Palpitations, and Atrial Fibrillation    Subjective    History of Present Illness {CC  Problem List  Visit  Diagnosis   Encounters  Notes  Medications  Labs  Result Review Imaging  Media :23}       History of Present Illness   52-year-old female presents the office today as a self-referral for ongoing evaluation of PAF.  Patient she was diagnosed with PAF in 2019 and has had 3 episodes with the longest lasting 3 hours.  Upon diagnosis she presented to [facility] ED and was given IV diltiazem.  Has never had a cardioversion.  Patient does report dyspnea on exertion but relates that to her size.  She currently denies chest pain, presyncope or syncope.  Patient does report a history of vertigo and reports occasional dizziness with sudden position changes.  Patient also reports she is unsteady on her feet secondary to neuropathy in bilateral lower extremities.  Patient has known PVCs and she reports they only occur infrequently.  Reports that she usually has yearly echo with Dr. Chau.  Patient previously followed by Dr. Chau but would like to transition care to [facility]ology.  History of hyperlipidemia, hypertension, venous stasis, chronic anticoagulation, morbid obesity, uncontrolled type 2 diabetes with peripheral neuropathy, multinodular goiter, hyperparathyroidism, GERD, IBS, hyperactivity of bladder, hematuria, ovarian cancer late 20s,  osteoarthritis, mild, obstructive sleep apnea on CPAP  Objective     Vital Signs:   Vitals:    10/31/22 1310 10/31/22 1312 10/31/22 1313   BP: 144/83 144/83 141/79   BP Location: Right arm Left arm Left arm   Patient Position: Sitting Sitting Standing   Cuff Size: Adult Adult Adult   Pulse: 86 96 98   Resp: 18     Temp: 96.4 °F (35.8 °C)     TempSrc: Temporal     SpO2: 97%     Weight: (!) 174 kg (384 lb)     Height: 170.2 cm (67\")       Body mass index is 60.14 kg/m².  Physical Exam  Vitals and nursing note " reviewed.   Constitutional:       Appearance: Normal appearance. She is obese.   HENT:      Head: Normocephalic.   Eyes:      Pupils: Pupils are equal, round, and reactive to light.   Cardiovascular:      Rate and Rhythm: Normal rate and regular rhythm.      Pulses: Normal pulses.      Heart sounds: Normal heart sounds. No murmur heard.  Pulmonary:      Effort: Pulmonary effort is normal.      Breath sounds: Normal breath sounds.   Abdominal:      General: Bowel sounds are normal.      Palpations: Abdomen is soft.   Musculoskeletal:         General: Normal range of motion.      Cervical back: Normal range of motion.      Right lower leg: No edema.      Left lower leg: No edema.   Skin:     General: Skin is warm and dry.      Capillary Refill: Capillary refill takes less than 2 seconds.   Neurological:      Mental Status: She is alert and oriented to person, place, and time.   Psychiatric:         Mood and Affect: Mood normal.         Thought Content: Thought content normal.              Result Review  Data Reviewed:{ Labs  Result Review  Imaging  Med Tab  Media :23}       EKG today sinus rhythm with occasional PVCs at 95 bpm  Low voltage QRS           Assessment and Plan {CC Problem List  Visit Diagnosis  ROS  Review (Popup)  Health Maintenance  Quality  BestPractice  Medications  SmartSets  SnapShot Encounters  Media :23}   1. PAF (paroxysmal atrial fibrillation) (HCC)  Has had 3 episodes in the last 4 years   has not required a cardioversion  CHADS-VASc Risk Assessment            3 Total Score    1 Hypertension    1 DM    1 Sex: Female        Criteria that do not apply:    CHF    Age >/= 75    PRIOR STROKE/TIA/THROMBO    Vascular Disease    Age 65-74        Anticoagulated with xarelto and denies any s/s of bleeding   - ECG 12 Lead; Future  - Adult Transthoracic Echo Complete W/ Cont if Necessary Per Protocol; Future  - Ambulatory Referral to Cardiology  Continue diltiazem daily   2. Mixed  hyperlipidemia  Stable on mevacor   - Ambulatory Referral to Cardiology    3. Benign essential hypertension  Well controlled   - Ambulatory Referral to Cardiology    4. Morbid obesity (HCC)  Did discuss weight loss and encouraged heart healthy diet, daily exercise   - Ambulatory Referral to Cardiology      Follow Up {Instructions Charge Capture  Follow-up Communications :23}   Return if symptoms worsen or fail to improve.    Patient was given instructions and counseling regarding her condition or for health maintenance advice. Please see specific information pulled into the AVS if appropriate.  Patient was instructed to call the [facility] with any questions, concerns, or worsening symptoms.

## 2022-11-02 PROBLEM — I48.0 PAF (PAROXYSMAL ATRIAL FIBRILLATION): Status: ACTIVE | Noted: 2022-11-02

## 2022-11-02 PROBLEM — R94.39 ABNORMAL STRESS TEST: Status: ACTIVE | Noted: 2022-11-02

## 2022-11-02 NOTE — PROGRESS NOTES
OFFICE VISIT  NOTE  [facility] CARDIOLOGY MAIN CAMPUS      Name: Azalea Mendoza    Date: 11/3/2022  MRN:  [id number]  :  1970      REFERRING/PRIMARY PROVIDER:  Nallely Frazier MD    Chief Complaint   Patient presents with   • Atrial Fibrillation       HPI: Azalea Mendoza is a 52 y.o. female who presents today for new consultation for PAF and hypertension.  Previously followed by Dr. Chau.  History of uncontrolled diabetes mellitus, A1c 11.9, hyperlipidemia, obesity, LUCERO, hypertension, PAF.  Last echo in our system in 2018 shows normal ejection fraction.  Last episode of A. fib was in .  She is working on losing weight, she is going to join [employer] and do water aerobics.  She reports stable shortness of breath.  Denies chest pain.  Currently on diltiazem 3 and 60 mg/day and Xarelto 20 mg daily    Past Medical History:   Diagnosis Date   • A-fib ([facility])     follows w/ Dr. Chau, on Eliquis   • Allergic rhinitis due to pollen    • Anxiety    • Asthma     mild, prn inhalers only, follows w/ Dr. Schneider    • Back pain, lumbosacral    • Cervicalgia    • Diabetic neuropathy ([facility])    • DM2 (diabetes mellitus, type 2) ([facility])     dx , on insulin 2+years, follows w/ Endocrinology, A1C >9   • Dyslipidemia    • Dyspepsia    • Dyspnea on exertion    • Fatigue    • Gallstones    • GERD (gastroesophageal reflux disease)     daily Omeprazole 20mg, controlled, denies recent EGD    • H/O cystoscopy     diagnostic cystoscopy   • Heart murmur    • Hyperparathyroidism ([facility])     s/p (R) parathyroidectomy 2019 @   • Hypertension    • IBS (irritable bowel syndrome)     exacerbated w/ salads and/or greasy foods, does not follow w/ GI   • Lump or mass in breast     last mammogram 2019 OK   • Lung nodule     RUL, 8mm, repeat Chest CT planned 2019   • Morbid obesity with BMI of 50.0-59.9, adult ([facility])    • Muscle spasm    • Osteoarthritis     takes prn Tylenol, denies  "NSAIDS/steroids   • Ovarian cancer (HCC)     dx 2000, s/p TAHBSO, no chemo/XRT   • Overactive bladder     on Detrol   • Primary hyperparathyroidism (HCC) 6/11/2018   • Right knee pain     pursuing \"nerve block\" w/ pain management, avoids NSAIDS/steroids   • Shingles    • Sleep apnea     CPAP compliant   • Thyroid nodule     follows w/ Dr. Yin @, conservative management       Past Surgical History:   Procedure Laterality Date   • COLONOSCOPY  2013   • HX OVARIAN CYSTECTOMY  1997    (open) lower transverse incision   • LAPAROSCOPIC OVARIAN CYSTECTOMY  2000    w/ CA dx   • PARATHYROIDECTOMY  09/22/2019    (R) parathyroidectomy w/ Dr. Yin @    • TOTAL ABDOMINAL HYSTERECTOMY WITH SALPINGO OOPHORECTOMY  09/2000    ovarian CA       Social History     Socioeconomic History   • Marital status:    Tobacco Use   • Smoking status: Never   • Smokeless tobacco: Never   Vaping Use   • Vaping Use: Never used   Substance and Sexual Activity   • Alcohol use: No   • Drug use: No   • Sexual activity: Defer       Family History   Problem Relation Age of Onset   • Hypertension Mother    • Arthritis Mother    • Arthritis Father    • Hypertension Father    • Hyperlipidemia Father    • Diabetes Father         type 2   • Stroke Father    • Obesity Father    • Cervical cancer Maternal Grandmother    • Heart attack Maternal Grandfather    • Leukemia Paternal Grandmother    • Alzheimer's disease Paternal Grandfather         ROS:   Constitutional no fever,  no weight loss   Skin no rash, no subcutaneous nodules   Otolaryngeal no difficulty swallowing   Cardiovascular See HPI   Pulmonary no cough, no sputum production   Gastrointestinal no constipation, no diarrhea   Genitourinary no dysuria, no hematuria   Hematologic no easy bruisability, no abnormal bleeding   Musculoskeletal no muscle pain   Neurologic no dizziness, no falls         Allergies   Allergen Reactions   • Fish Allergy Anaphylaxis, Itching and Nausea And Vomiting   • " Pravastatin Myalgia   • Shellfish Allergy Anaphylaxis   • Nuts Other (See Comments)     Based on allergy testing, avoids pecans/walnuts, but tolerates peanuts/PB w/out issue   • Ciprofloxacin-Dexamethasone Rash     Otic drops caused rash around the ears, but tolerates Cipro PO w/out issue   • Eggs Or Egg-Derived Products Swelling   • Neomycin-Bacitracin Zn-Polymyx Rash   • Penicillins Palpitations     Keflex OK          Current Outpatient Medications:   •  albuterol sulfate HFA (ProAir HFA) 108 (90 Base) MCG/ACT inhaler, Inhale 2 puffs Every 6 (Six) Hours As Needed for Wheezing., Disp: 18 g, Rfl: 0  •  azelastine (ASTELIN) 0.1 % nasal spray, 1 spray into the nostril(s) as directed by provider 2 (Two) Times a Day As Needed for Rhinitis or Allergies. Use in each nostril as directed, Disp: 1 each, Rfl: 5  •  benzonatate (TESSALON) 100 MG capsule, Take 1 capsule by mouth 3 (Three) Times a Day As Needed for Cough., Disp: 30 capsule, Rfl: 0  •  buPROPion XL (WELLBUTRIN XL) 150 MG 24 hr tablet, bupropion HCl  mg 24 hr tablet, extended release, Disp: , Rfl:   •  cetirizine (zyrTEC) 10 MG tablet, Take 1 tablet (10 mg total) by mouth Every Night, Disp: 90 tablet, Rfl: 3  •  Continuous Blood Gluc Sensor (DEXCOM G6 SENSOR), Every 10 (Ten) Days., Disp: 3 each, Rfl: 11  •  Continuous Blood Gluc Transmit (DEXCOM G6 TRANSMITTER) misc, 1 each Every 3 (Three) Months., Disp: 1 each, Rfl: 4  •  diltiaZEM (TIAZAC) 360 MG 24 hr capsule, Take 360 mg by mouth Daily., Disp: , Rfl:   •  diphenhydrAMINE (BENADRYL) 25 mg capsule, Take 25 mg by mouth Every 6 (Six) Hours As Needed for Itching., Disp: , Rfl:   •  EPIPEN 2-DAVID 0.3 MG/0.3ML solution auto-injector injection, USE AS INSTRUCTED, Disp: 2 each, Rfl: 1  •  escitalopram (LEXAPRO) 20 MG tablet, , Disp: , Rfl:   •  famotidine (PEPCID) 40 MG tablet, 1 po daily prn itch, Disp: 30 tablet, Rfl: 0  •  glucose blood test strip, 1 each by Other route 3 (Three) Times a Day Use as instructed,  "Disp: 300 each, Rfl: 3  •  Insulin Pen Needle (PEN NEEDLES) 32G X 4 MM misc, 1 each 2 (Two) Times a Day Before Meals., Disp: 200 each, Rfl: 3  •  Insulin Regular Human, Conc, (HumuLIN R U-500 KwikPen) 500 UNIT/ML solution pen-injector CONCENTRATED injection, Humulin R U-500 (Conc) Insulin Kwikpen 500 unit/mL (3 mL) subcutaneous, Disp: , Rfl:   •  losartan (COZAAR) 100 MG tablet, Take 1 tablet (100 mg total) by mouth Daily, Disp: 90 tablet, Rfl: 3  •  lovastatin (MEVACOR) 20 MG tablet, Take 20 mg by mouth Daily., Disp: , Rfl:   •  metFORMIN ER (GLUCOPHAGE-XR) 500 MG 24 hr tablet, Take 1 tablet by mouth 2 (Two) Times a Day Before Meals., Disp: 180 tablet, Rfl: 3  •  Mometasone Furo-Formoterol Fum (DULERA IN), Dulera, Disp: , Rfl:   •  Nucynta 75 MG tablet, TAKE 1 TABLET BY MOUTH TWICE A DAY   4/11/21, Disp: , Rfl:   •  omeprazole (priLOSEC) 20 MG capsule, TAKE ONE CAPSULE BY MOUTH ONCE DAILY, Disp: 90 capsule, Rfl: 1  •  PREMARIN 0.625 MG/GM vaginal cream, , Disp: , Rfl:   •  tolterodine LA (DETROL LA) 4 MG 24 hr capsule, TAKE 1 CAPSULE BY MOUTH EVERY DAY, Disp: 90 capsule, Rfl: 1  •  triamterene-hydrochlorothiazide (MAXZIDE-25) 37.5-25 MG per tablet, Take 1 tablet by mouth Daily., Disp: 30 tablet, Rfl: 0  •  Xarelto 20 MG tablet, Take 20 mg by mouth Daily., Disp: , Rfl:     Vitals:    11/03/22 1004   BP: 156/84   BP Location: Left arm   Patient Position: Sitting   Pulse: 99   SpO2: 98%   Weight: (!) 175 kg (385 lb)   Height: 170.2 cm (67\")     Body mass index is 60.3 kg/m².    PHYSICAL EXAM:    General Appearance:   · well developed  · well nourished  HENT:   · oropharynx moist  · lips not cyanotic  Neck:  · thyroid not enlarged  · supple  Respiratory:  · no respiratory distress  · normal breath sounds  · no rales  Cardiovascular:  · no jugular venous distention  · regular rhythm  · apical impulse normal  · S1 normal, S2 normal  · no S3, no S4   · no murmur  · no rub, no thrill  · carotid pulses normal; no " bruit  · lower extremity edema: none      Musculoskeletal:  · no clubbing of fingers.   · normocephalic, head atraumatic  Skin:   · warm, dry  Psychiatric:  · judgement and insight appropriate  · normal mood and affect    RESULTS:   Procedures    Results for orders placed during the hospital encounter of 08/19/18    Adult Transthoracic Echo Complete W/ Cont if Necessary Per Protocol    Interpretation Summary  · Left atrial cavity size is mildly dilated.  · Mild tricuspid valve regurgitation is present.  · Calculated right ventricular systolic pressure from tricuspid regurgitation is 43 mmHg.        Labs:  Lab Results   Component Value Date    CHOL 156 06/30/2016    TRIG 126 12/12/2019    HDL 48 12/12/2019     (H) 12/12/2019    AST 13 06/11/2020    ALT 8 06/11/2020     Lab Results   Component Value Date    HGBA1C 8.8 (H) 12/12/2019     No components found for: CREATINININE  eGFR Non  Am   Date Value Ref Range Status   12/12/2019 104 >59 mL/min/1.73 Final     eGFR Non  Amer   Date Value Ref Range Status   06/11/2020 69 >60 mL/min/1.73 Final   05/20/2019 93 >60 mL/min/1.73 Final   03/14/2019 85 >60 mL/min/1.73 Final       Most recent PCP note, imaging tests, and labs reviewed.    ASSESSMENT:  Problem List Items Addressed This Visit        Cardiac and Vasculature    Mixed hyperlipidemia    Benign essential hypertension - Primary    Heart murmur    Dyspnea on exertion    Abnormal stress test    Overview     2014 [facility] Normal coronary arteries  2/2014 Stress test: Moderate-to-severe perfusion defect involving the anterior wall of the left ventricle with reversibility on perfusion images during Lexiscan stress.                   PAF (paroxysmal atrial fibrillation) ([facility])    Overview     8/20/18 Echo: EF 69%, mildy dilated L atrial cavity, mild TR              Endocrine and Metabolic    Morbid obesity ([facility])       Sleep    LUCERO on CPAP       PLAN:    1.  History of abnormal stress test:  2014 abnormal  stress test with follow-up normal left heart catheterization    2.  Paroxysmal atrial fibrillation:  Continue anticoagulation with Xarelto  Recommend echocardiogram for surveillance, she will call around the first of the year when she is on new insurance.    3.  Hyperlipidemia:  Lipid panel 7/2022 showed total cholesterol 218,   Given diabetes status I would recommend a moderate intensity statin such as atorvastatin    4.  Hypertension:  Long-term goal blood pressure is 130/80  Continue current medical therapy  Low-sodium diet and exercise recommend    5.  LUCERO:  Discussed importance of compliance with positive airway pressure    6.  Uncontrolled diabetes mellitus  A1c 11.9 7/2022  Discussed importance of low glycemic index diet, weight loss and exercise as well as medication compliance  Consider endocrinology consultation.    7.  Morbid obesity:  We discussed the importance of weight loss and how it will help decreased incidence of further A. fib  She is working on weight loss with diet and exercise.  Consider GLP-1 agonist.    Advance Care Planning   ACP discussion was held with the patient during this visit. Patient does not have an advance directive, information provided.         Return to clinic in 12 months, or sooner as needed.    Thank you for the opportunity to share in the care of your patient; please do not hesitate to call me with any questions.     Boogie Wagner MD, FACC  Office: [phone]  [facility] [place] [address]    11/03/22

## 2022-11-03 ENCOUNTER — OFFICE VISIT (OUTPATIENT)
Dept: CARDIOLOGY | Facility: CLINIC | Age: 52
End: 2022-11-03

## 2022-11-03 VITALS
WEIGHT: 293 LBS | OXYGEN SATURATION: 98 % | BODY MASS INDEX: 45.99 KG/M2 | SYSTOLIC BLOOD PRESSURE: 156 MMHG | DIASTOLIC BLOOD PRESSURE: 84 MMHG | HEIGHT: 67 IN | HEART RATE: 99 BPM

## 2022-11-03 DIAGNOSIS — I48.0 PAF (PAROXYSMAL ATRIAL FIBRILLATION): ICD-10-CM

## 2022-11-03 DIAGNOSIS — R06.09 DYSPNEA ON EXERTION: ICD-10-CM

## 2022-11-03 DIAGNOSIS — Z99.89 OSA ON CPAP: ICD-10-CM

## 2022-11-03 DIAGNOSIS — I10 BENIGN ESSENTIAL HYPERTENSION: Primary | ICD-10-CM

## 2022-11-03 DIAGNOSIS — R01.1 HEART MURMUR: ICD-10-CM

## 2022-11-03 DIAGNOSIS — R94.39 ABNORMAL STRESS TEST: ICD-10-CM

## 2022-11-03 DIAGNOSIS — E78.2 MIXED HYPERLIPIDEMIA: ICD-10-CM

## 2022-11-03 DIAGNOSIS — E66.01 MORBID OBESITY: ICD-10-CM

## 2022-11-03 DIAGNOSIS — G47.33 OSA ON CPAP: ICD-10-CM

## 2022-11-03 PROCEDURE — 99214 OFFICE O/P EST MOD 30 MIN: CPT | Performed by: INTERNAL MEDICINE

## 2022-11-14 ENCOUNTER — APPOINTMENT (OUTPATIENT)
Dept: CARDIOLOGY | Facility: HOSPITAL | Age: 52
End: 2022-11-14

## 2023-01-05 RX ORDER — RIVAROXABAN 20 MG/1
20 TABLET, FILM COATED ORAL DAILY
Qty: 90 TABLET | Refills: 3 | Status: SHIPPED | OUTPATIENT
Start: 2023-01-05

## 2023-06-15 ENCOUNTER — TELEPHONE (OUTPATIENT)
Dept: CARDIOLOGY | Facility: CLINIC | Age: 53
End: 2023-06-15
Payer: MEDICARE

## 2023-06-15 NOTE — TELEPHONE ENCOUNTER
Pt enrolled in [employer]. She spoke with them yesterday and they told her they are faxing the prescriber form to us. Advised pt I will keep an eye out for it and fax it to them. Pt states she has enough medication to last her till middle of July

## 2023-06-19 ENCOUNTER — OFFICE VISIT (OUTPATIENT)
Dept: PULMONOLOGY | Facility: CLINIC | Age: 53
End: 2023-06-19
Payer: MEDICARE

## 2023-06-19 VITALS
RESPIRATION RATE: 18 BRPM | BODY MASS INDEX: 45.99 KG/M2 | DIASTOLIC BLOOD PRESSURE: 82 MMHG | HEART RATE: 94 BPM | WEIGHT: 293 LBS | HEIGHT: 67 IN | SYSTOLIC BLOOD PRESSURE: 132 MMHG | OXYGEN SATURATION: 96 %

## 2023-06-19 DIAGNOSIS — J45.20 MILD INTERMITTENT ASTHMA WITHOUT COMPLICATION: ICD-10-CM

## 2023-06-19 DIAGNOSIS — G47.33 OSA (OBSTRUCTIVE SLEEP APNEA): Primary | ICD-10-CM

## 2023-06-19 DIAGNOSIS — E66.01 MORBID OBESITY, UNSPECIFIED OBESITY TYPE: ICD-10-CM

## 2023-06-19 DIAGNOSIS — J30.89 OTHER ALLERGIC RHINITIS: ICD-10-CM

## 2023-06-19 PROCEDURE — 3075F SYST BP GE 130 - 139MM HG: CPT | Performed by: INTERNAL MEDICINE

## 2023-06-19 PROCEDURE — 99214 OFFICE O/P EST MOD 30 MIN: CPT | Performed by: INTERNAL MEDICINE

## 2023-06-19 PROCEDURE — 3079F DIAST BP 80-89 MM HG: CPT | Performed by: INTERNAL MEDICINE

## 2023-06-19 RX ORDER — RIVAROXABAN 20 MG/1
20 TABLET, FILM COATED ORAL DAILY
Qty: 90 TABLET | Refills: 3 | Status: SHIPPED | OUTPATIENT
Start: 2023-06-19

## 2023-06-19 RX ORDER — INSULIN ASPART 100 [IU]/ML
INJECTION, SUSPENSION SUBCUTANEOUS 2 TIMES DAILY WITH MEALS
COMMUNITY

## 2023-06-19 NOTE — PROGRESS NOTES
"  Chief Complaint   Patient presents with    Sleeping Problem    Follow-up         Subjective   Azalea Mendoza is a 52 y.o. female.   Patient was evaluated today for follow up of asthma, allergic rhinitis, Sleep apnea.     Patient doesn't report any issues with the PAP device. The patient describes no significant issues with her mask either.     Patient says that the compliance with the use of the equipment is good.     Patient says that her symptoms of fatigue & daytime sleepiness have been helped greatly with the use of PAP, as prescribed.     Patient says that she has not been using her nasal sprays on a seasonal basis and is noticing worsening nasal congestion as well as occasional runny nose.    Patient does not report any recent exacerbations requiring emergency room visits or hospitalizations. .    Patient is compliant with pulmonary medicines, as prescribed.     she is using the rescue inhalers minimally.       The following portions of the patient's history were reviewed and updated as appropriate: allergies, current medications, past family history, past medical history, past social history, and past surgical history.    Review of Systems   HENT:  Negative for sinus pressure, sneezing and sore throat.    Respiratory:  Positive for wheezing. Negative for cough, chest tightness and shortness of breath.    Psychiatric/Behavioral:  Positive for sleep disturbance.      Objective   Visit Vitals  /82   Pulse 94   Resp 18   Ht 170.2 cm (67.01\")   Wt (!) 173 kg (381 lb 6.4 oz)   SpO2 96%   BMI 59.72 kg/m²       Physical Exam  Vitals reviewed.   Constitutional:       Appearance: She is well-developed.   HENT:      Head: Atraumatic.      Mouth/Throat:      Comments: Oropharynx was crowded.  Neck:      Comments: Increased neck circumference noted.  Pulmonary:      Effort: Pulmonary effort is normal. No respiratory distress.      Breath sounds: Normal breath sounds. No wheezing or rales.   Musculoskeletal:      " Comments: Gait was normal.   Neurological:      Mental Status: She is alert and oriented to person, place, and time.         Assessment & Plan   Diagnoses and all orders for this visit:    1. LUCERO (obstructive sleep apnea) (Primary)    2. Morbid obesity, unspecified obesity type    3. Mild intermittent asthma without complication    4. Other allergic rhinitis           Return in about 1 year (around 6/19/2024) for Recheck, For Shantel Sanchez).    DISCUSSION (if any):  Sleep study performed in 2010  AHI was 27 / hour.         Latest PAP device provided in May 2018.  DME company: [employer]    PAP settings: 10/15  Mask type: nasal pillow    Compliance data was obtained from the her device/DME company.    Continue PAP device on a regular basis, at least 4 hours or more per night.    Sleep hygiene measures were discussed    Weight loss advised.    Patient was advised to continue her nasal spray on a seasonal basis, since her symptoms are consistent with seasonal allergic rhinitis.    Patient was advised to use rescue inhaler for when necessary purposes    Patient was also advised to keep a log of the use of rescue inhaler.      Dictated utilizing Dragon dictation.    This document was electronically signed by Rafy Schneider MD on 06/19/23 at 12:00 EDT

## 2023-11-07 ENCOUNTER — TELEPHONE (OUTPATIENT)
Dept: CARDIOLOGY | Facility: CLINIC | Age: 53
End: 2023-11-07
Payer: MEDICARE

## 2023-11-07 NOTE — TELEPHONE ENCOUNTER
Caller: Azalea Mendoza    Relationship to patient: Self    Best call back number: [phone]     Type of visit: FOLLOW UP     Requested date: ASAP     If rescheduling, when is the original appointment: 11/09/23     Additional notes:PATIENT CALLED IN TO RESCHEDULE HER APPOINTMENT DUE TO TESTING POSITIVE FOR COVID ON 11/07/23. PATIENT WOULD LIKE TO GET IN AS SOON AS POSSIBLE AFTER  QUARANTINE PERIOD.     PATIENT IS NOT CURRENTLY HAVING ANY CARDIAC ISSUES

## 2023-12-07 ENCOUNTER — OFFICE VISIT (OUTPATIENT)
Dept: CARDIOLOGY | Facility: CLINIC | Age: 53
End: 2023-12-07
Payer: MEDICARE

## 2023-12-07 VITALS
WEIGHT: 293 LBS | HEART RATE: 99 BPM | OXYGEN SATURATION: 94 % | BODY MASS INDEX: 45.99 KG/M2 | DIASTOLIC BLOOD PRESSURE: 76 MMHG | HEIGHT: 67 IN | SYSTOLIC BLOOD PRESSURE: 142 MMHG

## 2023-12-07 DIAGNOSIS — E78.2 MIXED HYPERLIPIDEMIA: ICD-10-CM

## 2023-12-07 DIAGNOSIS — E78.5 DYSLIPIDEMIA: ICD-10-CM

## 2023-12-07 DIAGNOSIS — I48.91 ATRIAL FIBRILLATION WITH RAPID VENTRICULAR RESPONSE: Primary | ICD-10-CM

## 2023-12-07 DIAGNOSIS — I48.0 PAF (PAROXYSMAL ATRIAL FIBRILLATION): ICD-10-CM

## 2023-12-07 DIAGNOSIS — I10 BENIGN ESSENTIAL HYPERTENSION: ICD-10-CM

## 2023-12-07 RX ORDER — RIVAROXABAN 20 MG/1
20 TABLET, FILM COATED ORAL DAILY
Qty: 90 TABLET | Refills: 3 | Status: SHIPPED | OUTPATIENT
Start: 2023-12-07

## 2023-12-07 RX ORDER — PEAK FLOW METER
EACH MISCELLANEOUS AS NEEDED
COMMUNITY
Start: 2023-08-25

## 2023-12-07 RX ORDER — ROSUVASTATIN CALCIUM 5 MG/1
5 TABLET, COATED ORAL DAILY
Qty: 90 TABLET | Refills: 3 | Status: SHIPPED | OUTPATIENT
Start: 2023-12-07

## 2023-12-07 NOTE — PROGRESS NOTES
OFFICE VISIT  NOTE  [facility] CARDIOLOGY      Name: Azalea Mendoza    Date: 2023  MRN:  [id number]  :  1970      REFERRING/PRIMARY PROVIDER:  Nallely Frazier MD     Chief Complaint   Patient presents with    Benign essential hypertension     HPI: Azalea Mendoza is a 53 y.o. female who presents today for follow up of PAF and hypertension. Previously followed by Dr. Chau.  History of uncontrolled diabetes mellitus, A1c 11.9, hyperlipidemia, obesity, LUCEOR, hypertension, PAF. Last echo in our system in 2018 shows normal ejection fraction.  Last episode of A. fib was in .  She is working on losing weight. She reports stable shortness of breath.  Denies chest pain.  Currently on diltiazem 360 mg/day and Xarelto 20 mg daily.  Working to get semaglutide approved.  History of intolerance to pravastatin but has not tried other statins.    ROS:Pertinent positives as listed in the HPI.  All other systems reviewed and negative.    Past Medical History:   Diagnosis Date    A-fib     follows w/ Dr. Chau, on Eliquis    Allergic rhinitis due to pollen     Anxiety     Asthma     mild, prn inhalers only, follows w/ Dr. Schneider     Back pain, lumbosacral     Cervicalgia     Diabetic neuropathy     DM2 (diabetes mellitus, type 2)     dx , on insulin 2+years, follows w/ Endocrinology, A1C >9    Dyslipidemia     Dyspepsia     Dyspnea on exertion     Fatigue     Gallstones     GERD (gastroesophageal reflux disease)     daily Omeprazole 20mg, controlled, denies recent EGD     H/O cystoscopy     diagnostic cystoscopy    Heart murmur     Hyperparathyroidism     s/p (R) parathyroidectomy 2019 @    Hypertension     IBS (irritable bowel syndrome)     exacerbated w/ salads and/or greasy foods, does not follow w/ GI    Lump or mass in breast     last mammogram 2019 OK    Lung nodule     RUL, 8mm, repeat Chest CT planned 2019    Morbid obesity with BMI of 50.0-59.9, adult      "Muscle spasm     Osteoarthritis     takes prn Tylenol, denies NSAIDS/steroids    Ovarian cancer     dx 2000, s/p TAHBSO, no chemo/XRT    Overactive bladder     on Detrol    Primary hyperparathyroidism 6/11/2018    Right knee pain     pursuing \"nerve block\" w/ pain management, avoids NSAIDS/steroids    Shingles     Sleep apnea     CPAP compliant    Thyroid nodule     follows w/ Dr. Yin @, conservative management       Past Surgical History:   Procedure Laterality Date    COLONOSCOPY  2013    HX OVARIAN CYSTECTOMY  1997    (open) lower transverse incision    LAPAROSCOPIC OVARIAN CYSTECTOMY  2000    w/ CA dx    PARATHYROIDECTOMY  09/22/2019    (R) parathyroidectomy w/ Dr. Yin @     TOTAL ABDOMINAL HYSTERECTOMY WITH SALPINGO OOPHORECTOMY  09/2000    ovarian CA       Social History     Socioeconomic History    Marital status:    Tobacco Use    Smoking status: Never    Smokeless tobacco: Never   Vaping Use    Vaping Use: Never used    Passive vaping exposure: Yes   Substance and Sexual Activity    Alcohol use: No    Drug use: No    Sexual activity: Defer       Family History   Problem Relation Age of Onset    Hypertension Mother     Arthritis Mother     Arthritis Father     Hypertension Father     Hyperlipidemia Father     Diabetes Father         type 2    Stroke Father     Obesity Father     Cervical cancer Maternal Grandmother     Heart attack Maternal Grandfather     Leukemia Paternal Grandmother     Alzheimer's disease Paternal Grandfather         Allergies   Allergen Reactions    Fish Allergy Anaphylaxis, Itching and Nausea And Vomiting    Pravastatin Myalgia    Shellfish Allergy Anaphylaxis    Nuts Other (See Comments)     Based on allergy testing, avoids pecans/walnuts, but tolerates peanuts/PB w/out issue    Ciprofloxacin-Dexamethasone Rash     Otic drops caused rash around the ears, but tolerates Cipro PO w/out issue    Eggs Or Egg-Derived Products Swelling    Neomycin-Bacitracin Zn-Polymyx Rash    " "Penicillins Palpitations     Keflex OK        Current Outpatient Medications   Medication Instructions    albuterol sulfate HFA (ProAir HFA) 108 (90 Base) MCG/ACT inhaler 2 puffs, Inhalation, Every 6 Hours PRN    azelastine (ASTELIN) 0.1 % nasal spray 1 spray, Nasal, 2 Times Daily PRN, Use in each nostril as directed    cetirizine (ZYRTEC) 10 mg, Oral, Nightly    dilTIAZem (TIAZAC) 360 mg, Oral, Daily    diphenhydrAMINE (BENADRYL) 25 mg, Oral, Every 6 Hours PRN    escitalopram (LEXAPRO) 20 MG tablet No dose, route, or frequency recorded.    glucose blood test strip 1 each, Other, 3 Times Daily, Use as instructed     HYDROcodone-acetaminophen (NORCO) 5-325 MG per tablet 1 tablet, Oral, Every 6 Hours PRN    insulin aspart prot-insulin aspart (novoLOG 70/30) (70-30) 100 UNIT/ML injection Subcutaneous, 2 Times Daily With Meals    Insulin Pen Needle (PEN NEEDLES) 32G X 4 MM misc 1 each, Does not apply, 2 Times Daily Before Meals    losartan (COZAAR) 100 mg, Oral, Daily    Nebulizers ([employer]ios Aerosol Delivery System) misc As Needed    omeprazole (priLOSEC) 20 MG capsule TAKE ONE CAPSULE BY MOUTH ONCE DAILY    rosuvastatin (CRESTOR) 5 mg, Oral, Daily    tolterodine LA (DETROL LA) 4 MG 24 hr capsule TAKE 1 CAPSULE BY MOUTH EVERY DAY    triamterene-hydrochlorothiazide (MAXZIDE-25) 37.5-25 MG per tablet 1 tablet, Oral, Daily    Xarelto 20 mg, Oral, Daily       Vitals:    12/07/23 1012   BP: 142/76   BP Location: Right arm   Patient Position: Sitting   Pulse: 99   SpO2: 94%   Weight: (!) 175 kg (385 lb)   Height: 170.2 cm (67\")     Body mass index is 60.3 kg/m².    PHYSICAL EXAM:    General Appearance:   well developed  well nourished  Neck:  thyroid not enlarged  supple  Respiratory:  no respiratory distress  normal breath sounds  no rales  Cardiovascular:  no jugular venous distention  regular rhythm  apical impulse normal  S1 normal, S2 normal  no S3, no S4   no murmur  no rub, no thrill  lower extremity edema: none    Skin: "   warm, dry    RESULTS:   Procedures    Results for orders placed during the hospital encounter of 08/19/18    Adult Transthoracic Echo Complete W/ Cont if Necessary Per Protocol    Interpretation Summary  · Left atrial cavity size is mildly dilated.  · Mild tricuspid valve regurgitation is present.  · Calculated right ventricular systolic pressure from tricuspid regurgitation is 43 mmHg.  Labs:  Lab Results   Component Value Date    CHOL 156 06/30/2016    TRIG 126 12/12/2019    HDL 48 12/12/2019     (H) 12/12/2019    AST 13 06/11/2020    ALT 8 06/11/2020     Lab Results   Component Value Date    HGBA1C 8.8 (H) 12/12/2019     Creatinine   Date Value Ref Range Status   06/11/2020 0.87 0.57 - 1.00 mg/dL Final   12/12/2019 0.67 0.57 - 1.00 mg/dL Final   05/20/2019 0.68 0.57 - 1.00 mg/dL Final   03/14/2019 0.73 0.60 - 1.30 mg/dL Final     eGFR Non  Am   Date Value Ref Range Status   12/12/2019 104 >59 mL/min/1.73 Final     eGFR Non  Amer   Date Value Ref Range Status   06/11/2020 69 >60 mL/min/1.73 Final   05/20/2019 93 >60 mL/min/1.73 Final   03/14/2019 85 >60 mL/min/1.73 Final         ASSESSMENT:  Problem List Items Addressed This Visit       Mixed hyperlipidemia    Relevant Medications    rosuvastatin (CRESTOR) 5 MG tablet    Benign essential hypertension    Atrial fibrillation with rapid ventricular response - Primary    Dyslipidemia    PAF (paroxysmal atrial fibrillation)    Overview     8/20/18 Echo: EF 69%, mildy dilated L atrial cavity, mild TR              PLAN:   1.  History of abnormal stress test:  2014 abnormal stress test with follow-up normal left heart catheterization     2.  Paroxysmal atrial fibrillation:  Continue anticoagulation with Xarelto, refill today.  Echo ordered last visit, patient was waiting on insurance      3.  Hyperlipidemia:  Lipid panel 7/2022 showed total cholesterol 218,   Given diabetic status we will start rosuvastatin 5 mg daily, she was previously  intolerant to pravastatin.     4.  Hypertension:  Long-term goal blood pressure is 130/80  Continue current medical therapy  Low-sodium diet and exercise recommend     5.  LUCERO:  Discussed importance of compliance with positive airway pressure     6.  Uncontrolled diabetes mellitus  A1c 11.9 7/2022  Discussed importance of low glycemic index diet, weight loss and exercise as well as medication compliance  Consider endocrinology consultation.     7.  Morbid obesity:  We discussed the importance of weight loss and how it will help decreased incidence of further A. fib  She is working on weight loss with diet and exercise.  Consider GLP-1 agonist.        Follow-up   Return in about 1 year (around 12/7/2024).      Boogie Wagner MD, FACC, [facility]  Interventional Cardiology

## 2024-02-26 NOTE — PROGRESS NOTES
Patient: Azalea Mendoza    YOB: 1970    Date: 03/01/2024    Primary Care Provider: Nallely Frazier MD    Chief Complaint   Patient presents with    Cyst     Back         SUBJECTIVE:    History of present illness:  Patient is here for an evaluation and treatment of a cyst on back.  It had been drained in the past twice and has become very enlarged and painful.  Some redness discoloration.  She comes in for evaluation.  The following portions of the patient's history were reviewed and updated as appropriate: allergies, current medications, past family history, past medical history, past social history, past surgical history and problem list.      Review of Systems   Constitutional:  Negative for chills, fever and unexpected weight change.   HENT:  Negative for hearing loss, trouble swallowing and voice change.    Eyes:  Negative for visual disturbance.   Respiratory:  Negative for apnea, cough, chest tightness, shortness of breath and wheezing.    Cardiovascular:  Negative for chest pain, palpitations and leg swelling.   Gastrointestinal:  Negative for abdominal distention, abdominal pain, anal bleeding, blood in stool, constipation, diarrhea, nausea, rectal pain and vomiting.   Endocrine: Negative for cold intolerance and heat intolerance.   Genitourinary:  Negative for difficulty urinating, dysuria and flank pain.   Musculoskeletal:  Negative for back pain and gait problem.   Skin:  Negative for color change, rash and wound.   Neurological:  Negative for dizziness, syncope, speech difficulty, weakness, light-headedness, numbness and headaches.   Hematological:  Negative for adenopathy. Does not bruise/bleed easily.   Psychiatric/Behavioral:  Negative for confusion. The patient is not nervous/anxious.        Allergies:  Allergies   Allergen Reactions    Fish Allergy Anaphylaxis, Itching and Nausea And Vomiting    Pravastatin Myalgia    Shellfish Allergy Anaphylaxis    Nuts Other (See  Comments)     Based on allergy testing, avoids pecans/walnuts, but tolerates peanuts/PB w/out issue    Ciprofloxacin-Dexamethasone Rash     Otic drops caused rash around the ears, but tolerates Cipro PO w/out issue    Eggs Or Egg-Derived Products Swelling    Neomycin-Bacitracin Zn-Polymyx Rash    Penicillins Palpitations     Keflex OK   Beta lactam allergy details  Antibiotic reaction: other (rapid heart rate)  Age at reaction: child  Dose to reaction time: unknown  Reason for antibiotic: unknown  Epinephrine required for reaction?: unknown  Tolerated antibiotics: other           Medications:    Current Outpatient Medications:     albuterol sulfate HFA (ProAir HFA) 108 (90 Base) MCG/ACT inhaler, Inhale 2 puffs Every 6 (Six) Hours As Needed for Wheezing., Disp: 18 g, Rfl: 0    Alcohol Swabs (DropSafe Alcohol Prep) 70 % pads, , Disp: , Rfl:     azelastine (ASTELIN) 0.1 % nasal spray, 1 spray into the nostril(s) as directed by provider 2 (Two) Times a Day As Needed for Rhinitis or Allergies. Use in each nostril as directed, Disp: 1 each, Rfl: 5    cetirizine (zyrTEC) 10 MG tablet, Take 1 tablet (10 mg total) by mouth Every Night, Disp: 90 tablet, Rfl: 3    Continuous Blood Gluc Sensor (FreeStyle Wesley 3 Sensor) misc, , Disp: , Rfl:     diltiaZEM (TIAZAC) 360 MG 24 hr capsule, Take 1 capsule by mouth Daily., Disp: , Rfl:     diphenhydrAMINE (BENADRYL) 25 mg capsule, Take 1 capsule by mouth Every 6 (Six) Hours As Needed for Itching., Disp: , Rfl:     escitalopram (LEXAPRO) 20 MG tablet, , Disp: , Rfl:     fluticasone (FLONASE) 50 MCG/ACT nasal spray, 1 spray 2 (Two) Times a Day. shake liquid, Disp: , Rfl:     glucose blood test strip, 1 each by Other route 3 (Three) Times a Day Use as instructed, Disp: 300 each, Rfl: 3    HYDROcodone-acetaminophen (NORCO) 5-325 MG per tablet, Take 1 tablet by mouth Every 6 (Six) Hours As Needed., Disp: , Rfl:     insulin aspart prot-insulin aspart (novoLOG 70/30) (70-30) 100 UNIT/ML  injection, Inject  under the skin into the appropriate area as directed 2 (Two) Times a Day With Meals., Disp: , Rfl:     Insulin Pen Needle (PEN NEEDLES) 32G X 4 MM misc, 1 each 2 (Two) Times a Day Before Meals., Disp: 200 each, Rfl: 3    levoFLOXacin (LEVAQUIN) 500 MG tablet, TAKE 1 TABLET BY MOUTH EVERY 24 HOURS FOR 5 DAYS, Disp: , Rfl:     losartan (COZAAR) 100 MG tablet, Take 1 tablet (100 mg total) by mouth Daily, Disp: 90 tablet, Rfl: 3    Nebulizers (Vios Aerosol Delivery System) misc, As Needed., Disp: , Rfl:     NovoLOG FlexPen 100 UNIT/ML solution pen-injector sc pen, , Disp: , Rfl:     omeprazole (priLOSEC) 20 MG capsule, TAKE ONE CAPSULE BY MOUTH ONCE DAILY, Disp: 90 capsule, Rfl: 1    rosuvastatin (CRESTOR) 5 MG tablet, Take 1 tablet by mouth Daily., Disp: 90 tablet, Rfl: 3    tolterodine LA (DETROL LA) 4 MG 24 hr capsule, TAKE 1 CAPSULE BY MOUTH EVERY DAY, Disp: 90 capsule, Rfl: 1    triamterene-hydrochlorothiazide (MAXZIDE-25) 37.5-25 MG per tablet, Take 1 tablet by mouth Daily., Disp: 30 tablet, Rfl: 0    TRUEplus Lancets 33G misc, , Disp: , Rfl:     Xarelto 20 MG tablet, Take 1 tablet by mouth Daily., Disp: 90 tablet, Rfl: 3    History:  Past Medical History:   Diagnosis Date    A-fib     follows w/ Dr. Chau, on Eliquis    Allergic rhinitis due to pollen     Anxiety     Asthma     mild, prn inhalers only, follows w/ Dr. Schneider     Back pain, lumbosacral     Cervicalgia     Diabetic neuropathy     DM2 (diabetes mellitus, type 2)     dx 2005, on insulin 2+years, follows w/ Endocrinology, A1C >9    Dyslipidemia     Dyspepsia     Dyspnea on exertion     Fatigue     Gallstones     GERD (gastroesophageal reflux disease)     daily Omeprazole 20mg, controlled, denies recent EGD     H/O cystoscopy     diagnostic cystoscopy    Heart murmur     Hyperparathyroidism     s/p (R) parathyroidectomy 9/2019 @[facility]    Hypertension     IBS (irritable bowel syndrome)     exacerbated w/ salads and/or greasy foods, does  "not follow w/ GI    Lump or mass in breast     last mammogram 1/2019 [place]    Lung nodule     RUL, 8mm, repeat Chest CT planned 12/2019    Morbid obesity with BMI of 50.0-59.9, adult     Muscle spasm     Osteoarthritis     takes prn Tylenol, denies NSAIDS/steroids    Ovarian cancer     dx 2000, s/p TAHBSO, no chemo/XRT    Overactive bladder     on Detrol    Primary hyperparathyroidism 6/11/2018    Right knee pain     pursuing \"nerve block\" w/ pain management, avoids NSAIDS/steroids    Shingles     Sleep apnea     CPAP compliant    Thyroid nodule     follows w/ Dr. Yin @, conservative management       Past Surgical History:   Procedure Laterality Date    COLONOSCOPY  2013    HX OVARIAN CYSTECTOMY  1997    (open) lower transverse incision    LAPAROSCOPIC OVARIAN CYSTECTOMY  2000    w/ CA dx    PARATHYROIDECTOMY  09/22/2019    (R) parathyroidectomy w/ Dr. Yin @     TOTAL ABDOMINAL HYSTERECTOMY WITH SALPINGO OOPHORECTOMY  09/2000    ovarian CA       Family History   Problem Relation Age of Onset    Hypertension Mother     Arthritis Mother     Arthritis Father     Hypertension Father     Hyperlipidemia Father     Diabetes Father         type 2    Stroke Father     Obesity Father     Cervical cancer Maternal Grandmother     Heart attack Maternal Grandfather     Leukemia Paternal Grandmother     Alzheimer's disease Paternal Grandfather        Social History     Tobacco Use    Smoking status: Never    Smokeless tobacco: Never   Vaping Use    Vaping Use: Never used    Passive vaping exposure: Yes   Substance Use Topics    Alcohol use: No    Drug use: No        OBJECTIVE:    Vital Signs:   Vitals:    03/01/24 1358   BP: 140/82   Pulse: 102   Temp: 98.2 °F (36.8 °C)   SpO2: 96%   Weight: (!) 172 kg (380 lb)   Height: 170.2 cm (67.01\")       Physical Exam:   General Appearance:    Alert, cooperative, in no acute distress   Head:    Normocephalic, without obvious abnormality, atraumatic   Eyes:            Lids and lashes " normal, conjunctivae and sclerae normal, no   icterus, no pallor, corneas clear, PERRLA   Ears:    Ears appear intact with no abnormalities noted   Throat:   No oral lesions, no thrush, oral mucosa moist   Neck:   No adenopathy, supple, trachea midline, no thyromegaly, no   carotid bruit, no JVD   Lungs:     Clear to auscultation,respirations regular, even and                  unlabored    Heart:    Regular rhythm and normal rate, normal S1 and S2, no            murmur, no gallop, no rub, no click   Chest Wall:    No abnormalities observed   Abdomen:     Normal bowel sounds, no masses, no organomegaly, soft        non-tender, non-distended, no guarding, no rebound                tenderness   Extremities:   Moves all extremities well, no edema, no cyanosis, no             redness   Pulses:   Pulses palpable and equal bilaterally   Skin:   No bleeding, bruising or rash.  A 5 cm patient is present with inflammation and drainage in the center.  Very tender to palpation.   Lymph nodes:   No palpable adenopathy   Neurologic:   Cranial nerves 2 - 12 grossly intact, sensation intact, DTR       present and equal bilaterally     Results Review:   I reviewed the patient's new clinical results.    Review of Systems was reviewed and confirmed as accurate as documented by the MA.    ASSESSMENT/PLAN:    1. Sebaceous cyst        Location: Right upper back    Procedure: Excision 5 cm sebaceous cyst upper back with layered closure.      I recommend excision. Procedure and the risks and benefits were explained including bleeding and infection. The patient understands these and wishes to proceed.     The patient was brought to the procedure room. Consent and time out were performed. The area was prepped and draped in the usual fashion. 1% lidocaine with epinephrine was infused locally. An ellyptical incision was made around the lesion. Full thickness excision was performed. The lesion size was 5 cm. The wound was closed in layers with  interrupted simple vicryl and Nylon for the skin. Wound closure size was 6 cm. There were no complications and the patient tolerated the procedure well. Hemostasis was well controlled with pressure and there was minimal blood loss. Wound instructions were given.     I discussed the patients findings and my recommendations with patient        Electronically signed by Kang Polk MD  03/01/24           lower

## 2024-03-01 ENCOUNTER — OFFICE VISIT (OUTPATIENT)
Dept: SURGERY | Facility: CLINIC | Age: 54
End: 2024-03-01
Payer: MEDICARE

## 2024-03-01 VITALS
TEMPERATURE: 98.2 F | WEIGHT: 293 LBS | SYSTOLIC BLOOD PRESSURE: 140 MMHG | DIASTOLIC BLOOD PRESSURE: 82 MMHG | HEIGHT: 67 IN | HEART RATE: 102 BPM | OXYGEN SATURATION: 96 % | BODY MASS INDEX: 45.99 KG/M2

## 2024-03-01 DIAGNOSIS — L72.3 SEBACEOUS CYST: Primary | ICD-10-CM

## 2024-03-01 RX ORDER — FLUTICASONE PROPIONATE 50 MCG
1 SPRAY, SUSPENSION (ML) NASAL 2 TIMES DAILY
COMMUNITY
Start: 2024-02-14

## 2024-03-01 RX ORDER — GLUCOSAM/CHON-MSM1/C/MANG/BOSW 500-416.6
TABLET ORAL
COMMUNITY
Start: 2024-01-12

## 2024-03-01 RX ORDER — INSULIN ASPART 100 [IU]/ML
INJECTION, SOLUTION INTRAVENOUS; SUBCUTANEOUS
COMMUNITY
Start: 2024-02-14

## 2024-03-01 RX ORDER — BLOOD-GLUCOSE SENSOR
EACH MISCELLANEOUS
COMMUNITY
Start: 2024-02-05

## 2024-03-01 RX ORDER — ISOPROPYL ALCOHOL 70 ML/100ML
SWAB TOPICAL
COMMUNITY
Start: 2024-01-12

## 2024-03-01 RX ORDER — LEVOFLOXACIN 500 MG/1
TABLET, FILM COATED ORAL
COMMUNITY
Start: 2024-02-28

## 2024-03-05 ENCOUNTER — TELEPHONE (OUTPATIENT)
Dept: SURGERY | Facility: CLINIC | Age: 54
End: 2024-03-05
Payer: MEDICARE

## 2024-03-05 NOTE — TELEPHONE ENCOUNTER
Pt is s/p excision of cyst on her back which was done recently, she stated that she was asked to put Neosporin on it when she changes her dressing but she forgot to tell us she is allergic to it.  Pt requested Rx for Mupirocin ointment.

## 2024-03-07 ENCOUNTER — OFFICE VISIT (OUTPATIENT)
Dept: PULMONOLOGY | Facility: CLINIC | Age: 54
End: 2024-03-07
Payer: MEDICARE

## 2024-03-07 VITALS
DIASTOLIC BLOOD PRESSURE: 82 MMHG | RESPIRATION RATE: 18 BRPM | HEART RATE: 88 BPM | BODY MASS INDEX: 45.99 KG/M2 | HEIGHT: 67 IN | OXYGEN SATURATION: 96 % | WEIGHT: 293 LBS | SYSTOLIC BLOOD PRESSURE: 142 MMHG

## 2024-03-07 DIAGNOSIS — J45.20 MILD INTERMITTENT ASTHMA WITHOUT COMPLICATION: ICD-10-CM

## 2024-03-07 DIAGNOSIS — E66.01 MORBID OBESITY, UNSPECIFIED OBESITY TYPE: ICD-10-CM

## 2024-03-07 DIAGNOSIS — R06.02 SOB (SHORTNESS OF BREATH): ICD-10-CM

## 2024-03-07 DIAGNOSIS — J20.9 ACUTE BRONCHITIS, UNSPECIFIED ORGANISM: ICD-10-CM

## 2024-03-07 DIAGNOSIS — G47.33 OSA (OBSTRUCTIVE SLEEP APNEA): Primary | ICD-10-CM

## 2024-03-07 DIAGNOSIS — J30.89 OTHER ALLERGIC RHINITIS: ICD-10-CM

## 2024-03-07 RX ORDER — METHYLPREDNISOLONE 4 MG/1
TABLET ORAL 2 TIMES DAILY
COMMUNITY

## 2024-03-07 RX ORDER — BUDESONIDE AND FORMOTEROL FUMARATE DIHYDRATE 80; 4.5 UG/1; UG/1
2 AEROSOL RESPIRATORY (INHALATION)
COMMUNITY

## 2024-03-07 RX ORDER — PREDNISONE 20 MG/1
TABLET ORAL
Qty: 10 TABLET | Refills: 0 | Status: SHIPPED | OUTPATIENT
Start: 2024-03-07

## 2024-03-07 NOTE — PROGRESS NOTES
"Patient: Azalea Mendoza    YOB: 1970    Date: 03/11/2024    Primary Care Provider: Nallely Frazier MD    Chief Complaint   Patient presents with    Post-op Follow-up     EXC       History of present illness:  I saw the patient in the office today as a followup from their recent lesion excision.  They state that they have done well and are having no problems.    Vital Signs:  Vitals:    03/11/24 1324   BP: 142/84   Pulse: 101   Temp: 98 °F (36.7 °C)   SpO2: 98%   Weight: (!) 178 kg (393 lb)   Height: 170.2 cm (67.01\")       Physical Exam:   General Appearance:    Alert, cooperative, in no acute distress, wound clean dry without infection   Abdomen:     no masses, no organomegaly, soft non-tender, non-distended, no guarding, wounds are well healed   Chest:      Clear to ausculation       Assessment / Plan:    1. Postoperative visit        I did discuss the situation with the patient today in the office and they have done well from their recent lesion excision, I don't think that the patient needs any further intervention and I need to see them back only if they have further problems. Pathology report was reviewed with the patient in the office.    Electronically signed by Kang Polk MD  03/11/24                    "

## 2024-03-07 NOTE — PROGRESS NOTES
"Chief Complaint   Patient presents with    Breathing Problem    Follow-up         Subjective   Azalea Mendoza is a 53 y.o. female.   Patient is here today for follow up of asthma, shortness of breath, and LUCERO.     The patient was treated for URI and given 2 rounds of antibiotics doxy and levaquin. She continues to cough. She was treated a 3rd time with symbicort and steroid pack. She continues to feel chest tightness , coughing, and wheezing.     She states she was told not to use ADELITA for now and she has nebulized medication but it makes her shaky and she was told not to use it??? The patient is not sure what nebulized medication it is.     She uses flonase and astelin daily.     She uses CPAP nightly with nasal pillows.     The following portions of the patient's history were reviewed and updated as appropriate: allergies, current medications, past family history, past medical history, past social history, and past surgical history.    Review of Systems   HENT:  Negative for sinus pressure, sneezing and sore throat.    Respiratory:  Positive for cough, chest tightness, shortness of breath and wheezing.    Psychiatric/Behavioral:  Positive for sleep disturbance.        Objective   Visit Vitals  /82   Pulse 88   Resp 18   Ht 170.2 cm (67.01\") Comment: pt reported   Wt (!) 178 kg (393 lb)   SpO2 96%   BMI 61.54 kg/m²         Physical Exam  Vitals reviewed.   HENT:      Head: Atraumatic.      Mouth/Throat:      Mouth: Mucous membranes are moist.      Comments: Crowded oropharynx.   Eyes:      Extraocular Movements: Extraocular movements intact.   Cardiovascular:      Rate and Rhythm: Normal rate and regular rhythm.   Pulmonary:      Effort: Pulmonary effort is normal. No respiratory distress.      Comments: No wheezing currently.  Abdominal:      Comments: Obese abdomen.    Musculoskeletal:      Comments: Gait normal.   Skin:     General: Skin is warm.   Neurological:      Mental Status: She is alert and " oriented to person, place, and time.           Assessment & Plan   Diagnoses and all orders for this visit:    1. LUCERO (obstructive sleep apnea) (Primary)    2. Mild intermittent asthma without complication    3. Other allergic rhinitis    4. Morbid obesity, unspecified obesity type    5. Acute bronchitis, unspecified organism    6. SOB (shortness of breath)  -     Complete PFT - Pre & Post Bronchodilator; Future    Other orders  -     predniSONE (DELTASONE) 20 MG tablet; Take 2 tablets daily for 5 days.  Dispense: 10 tablet; Refill: 0           Return for keep appt in June, PFT.    DISCUSSION (if any):  PFT ordered for f/u visit.     Her symptoms of asthma are under poor control at this time. Continue symbicort and will give samples of Breztri for next 2-4 weeks.     If symptoms worsen or improve and return she will need RAST, CBC, and IgE w/u.     Patient's medications for underlying asthma were reviewed with her in great detail.    Any needed adjustments to her pulmonary medications, either for clinical or insurance coverage reasons, have been made and are reflected in the orders.    Side effects of prescribed medications discussed with the patient.    Asthma action plan with discussed with her.    The patient was asked to call this office if the symptoms worsen.     Split night study from 2010   AHI 27 per hour  AutoPAP 10/15  DME: [facility]    Continue treatment with AutoPAP at a pressure of 10/15, with a nasal mask.    Patient's compliance data was reviewed and the compliance is 100%.    Humidification setup, hose and mask care discussed.    Weight loss advised.    Use every night for at least 4 hours stressed.       Dictated utilizing Dragon dictation.    This document was electronically signed by JARRET Robert March 7, 2024  10:08 EST

## 2024-03-11 ENCOUNTER — OFFICE VISIT (OUTPATIENT)
Dept: SURGERY | Facility: CLINIC | Age: 54
End: 2024-03-11
Payer: MEDICARE

## 2024-03-11 VITALS
TEMPERATURE: 98 F | WEIGHT: 293 LBS | HEIGHT: 67 IN | BODY MASS INDEX: 45.99 KG/M2 | DIASTOLIC BLOOD PRESSURE: 84 MMHG | SYSTOLIC BLOOD PRESSURE: 142 MMHG | HEART RATE: 101 BPM | OXYGEN SATURATION: 98 %

## 2024-03-11 DIAGNOSIS — Z48.89 POSTOPERATIVE VISIT: Primary | ICD-10-CM

## 2024-03-11 PROCEDURE — 1160F RVW MEDS BY RX/DR IN RCRD: CPT | Performed by: SURGERY

## 2024-03-11 PROCEDURE — 1159F MED LIST DOCD IN RCRD: CPT | Performed by: SURGERY

## 2024-03-11 PROCEDURE — 3077F SYST BP >= 140 MM HG: CPT | Performed by: SURGERY

## 2024-03-11 PROCEDURE — 3079F DIAST BP 80-89 MM HG: CPT | Performed by: SURGERY

## 2024-03-11 PROCEDURE — 99024 POSTOP FOLLOW-UP VISIT: CPT | Performed by: SURGERY

## 2024-05-13 RX ORDER — RIVAROXABAN 20 MG/1
20 TABLET, FILM COATED ORAL DAILY
Qty: 90 TABLET | Refills: 3 | Status: SHIPPED | OUTPATIENT
Start: 2024-05-13

## 2024-09-13 RX ORDER — ROSUVASTATIN CALCIUM 5 MG/1
5 TABLET, COATED ORAL DAILY
Qty: 90 TABLET | Refills: 3 | Status: SHIPPED | OUTPATIENT
Start: 2024-09-13

## 2024-09-27 DIAGNOSIS — G47.33 OSA (OBSTRUCTIVE SLEEP APNEA): Primary | ICD-10-CM

## 2024-10-05 NOTE — TELEPHONE ENCOUNTER
PATIENT WANTED TO INFORM YOU THAT HER SUGAR HAS BEEN GETTING A LITTLE BIT HIGHER THAN BEFORE SINCE BEING OFF TRESIBA.  SHE SAYS IT HAS BEEN CONSISTENTLY IN THE 130s IN THE AM. SHE IS NOT SURE IF YOU WOULD THINK SOMETHING SHOULD BE ADDED TO HER ROUTINE MEDICATIONS OR IF THAT IS NORMAL.    SHE ALSO HAS A UTI AND IS UNSURE IF THIS MAY BE THE CAUSE OF HER RAISED BLOOD SUGAR.    68

## 2024-12-12 ENCOUNTER — OFFICE VISIT (OUTPATIENT)
Dept: CARDIOLOGY | Facility: CLINIC | Age: 54
End: 2024-12-12
Payer: MEDICARE

## 2024-12-12 VITALS
HEIGHT: 67 IN | OXYGEN SATURATION: 96 % | BODY MASS INDEX: 45.99 KG/M2 | DIASTOLIC BLOOD PRESSURE: 70 MMHG | SYSTOLIC BLOOD PRESSURE: 112 MMHG | WEIGHT: 293 LBS | HEART RATE: 81 BPM

## 2024-12-12 DIAGNOSIS — R07.9 CHEST PAIN, UNSPECIFIED TYPE: ICD-10-CM

## 2024-12-12 DIAGNOSIS — I10 BENIGN ESSENTIAL HYPERTENSION: Chronic | ICD-10-CM

## 2024-12-12 DIAGNOSIS — I48.91 ATRIAL FIBRILLATION WITH RAPID VENTRICULAR RESPONSE: Primary | ICD-10-CM

## 2024-12-12 DIAGNOSIS — R01.1 HEART MURMUR: ICD-10-CM

## 2024-12-12 DIAGNOSIS — I48.0 PAF (PAROXYSMAL ATRIAL FIBRILLATION): Chronic | ICD-10-CM

## 2024-12-12 DIAGNOSIS — E78.2 MIXED HYPERLIPIDEMIA: Chronic | ICD-10-CM

## 2024-12-12 DIAGNOSIS — I48.0 PAF (PAROXYSMAL ATRIAL FIBRILLATION): Primary | Chronic | ICD-10-CM

## 2024-12-12 DIAGNOSIS — R07.2 PRECORDIAL PAIN: ICD-10-CM

## 2024-12-12 DIAGNOSIS — R94.39 ABNORMAL STRESS TEST: ICD-10-CM

## 2024-12-12 PROCEDURE — 3078F DIAST BP <80 MM HG: CPT | Performed by: INTERNAL MEDICINE

## 2024-12-12 PROCEDURE — 3074F SYST BP LT 130 MM HG: CPT | Performed by: INTERNAL MEDICINE

## 2024-12-12 PROCEDURE — 99214 OFFICE O/P EST MOD 30 MIN: CPT | Performed by: INTERNAL MEDICINE

## 2024-12-12 PROCEDURE — 93000 ELECTROCARDIOGRAM COMPLETE: CPT | Performed by: INTERNAL MEDICINE

## 2024-12-12 RX ORDER — PREGABALIN 75 MG/1
1 CAPSULE ORAL 2 TIMES DAILY
COMMUNITY
Start: 2024-10-31

## 2024-12-12 RX ORDER — METFORMIN HYDROCHLORIDE 500 MG/1
1 TABLET, EXTENDED RELEASE ORAL EVERY 12 HOURS SCHEDULED
COMMUNITY
Start: 2024-09-19

## 2024-12-12 RX ORDER — SOLIFENACIN SUCCINATE 10 MG/1
10 TABLET, FILM COATED ORAL DAILY
COMMUNITY
Start: 2024-11-04

## 2024-12-12 NOTE — PROGRESS NOTES
OFFICE VISIT  NOTE  [facility] CARDIOLOGY      Name: Azalea Mendoza    Date: 2024  MRN:  [id number]  :  1970      REFERRING/PRIMARY PROVIDER:  Nallely Frazier MD     Chief Complaint   Patient presents with    Atrial fibrillation with rapid ventricular response     HPI: Azalea Mendoza is a 54 y.o. female who presents for PAF and hypertension. Previously followed by Dr. Chau.  History of uncontrolled diabetes mellitus, A1c 11.9, hyperlipidemia, obesity, LUCERO, hypertension, PAF. Last echo in our system in 2018 shows normal ejection fraction.  Last episode of A. fib was in .  She is working on losing weight. She reports stable shortness of breath.  But she is worsening fatigue and weakness, mainly when she is up and doing things she is concerned this may be anginal equivalent.  No palpitations or bleeding complications with current medical therapy.    ROS:Pertinent positives as listed in the HPI.  All other systems reviewed and negative.    Past Medical History:   Diagnosis Date    A-fib     follows w/ Dr. Chau, on Eliquis    Allergic rhinitis due to pollen     Anxiety     Arrhythmia     Asthma     mild, prn inhalers only, follows w/ Dr. Schneider     Back pain, lumbosacral     Cervicalgia     Diabetic neuropathy     DM2 (diabetes mellitus, type 2)     dx , on insulin 2+years, follows w/ Endocrinology, A1C >9    Dyslipidemia     Dyspepsia     Dyspnea on exertion     Fatigue     Gallstones     GERD (gastroesophageal reflux disease)     daily Omeprazole 20mg, controlled, denies recent EGD     H/O cystoscopy     diagnostic cystoscopy    Heart murmur     Hyperparathyroidism     s/p (R) parathyroidectomy 2019 @    Hypertension     IBS (irritable bowel syndrome)     exacerbated w/ salads and/or greasy foods, does not follow w/ GI    Lump or mass in breast     last mammogram 2019 [place]    Lung nodule     RUL, 8mm, repeat Chest CT planned 2019    Morbid obesity  "with BMI of 50.0-59.9, adult     Muscle spasm     Osteoarthritis     takes prn Tylenol, denies NSAIDS/steroids    Ovarian cancer     dx 2000, s/p TAHBSO, no chemo/XRT    Overactive bladder     on Detrol    Primary hyperparathyroidism 06/11/2018    Right knee pain     pursuing \"nerve block\" w/ pain management, avoids NSAIDS/steroids    Shingles     Sleep apnea     CPAP compliant    Thyroid nodule     follows w/ Dr. Yin @, conservative management       Past Surgical History:   Procedure Laterality Date    CARDIAC CATHETERIZATION      COLONOSCOPY  2013    CYST REMOVAL  03/01/2024    HX OVARIAN CYSTECTOMY  1997    (open) lower transverse incision    LAPAROSCOPIC OVARIAN CYSTECTOMY  2000    w/ CA dx    PARATHYROIDECTOMY  09/22/2019    (R) parathyroidectomy w/ Dr. Yin @     TOTAL ABDOMINAL HYSTERECTOMY WITH SALPINGO OOPHORECTOMY  09/2000    ovarian CA       Social History     Socioeconomic History    Marital status:    Tobacco Use    Smoking status: Never    Smokeless tobacco: Never   Vaping Use    Vaping status: Never Used    Passive vaping exposure: Yes   Substance and Sexual Activity    Alcohol use: No    Drug use: No    Sexual activity: Not Currently     Partners: Male     Birth control/protection: Hysterectomy     Comment: Hysterectomy       Family History   Problem Relation Age of Onset    Hypertension Mother     Arthritis Mother     Arthritis Father     Hypertension Father     Hyperlipidemia Father     Diabetes Father         type 2    Stroke Father     Obesity Father     Heart attack Father     Heart failure Father     Cervical cancer Maternal Grandmother     Heart attack Maternal Grandfather     Leukemia Paternal Grandmother     Alzheimer's disease Paternal Grandfather         Allergies   Allergen Reactions    Fish Allergy Anaphylaxis, Itching and Nausea And Vomiting    Pravastatin Myalgia    Shellfish Allergy Anaphylaxis    Nuts Other (See Comments)     Based on allergy testing, avoids pecans/walnuts, " but tolerates peanuts/PB w/out issue    Rosuvastatin Myalgia    Ciprofloxacin-Dexamethasone Rash     Otic drops caused rash around the ears, but tolerates Cipro PO w/out issue    Egg-Derived Products Swelling    Neomycin-Bacitracin Zn-Polymyx Rash    Penicillins Palpitations     Keflex OK   Beta lactam allergy details  Antibiotic reaction: other (rapid heart rate)  Age at reaction: child  Dose to reaction time: unknown  Reason for antibiotic: unknown  Epinephrine required for reaction?: unknown  Tolerated antibiotics: other           Current Outpatient Medications   Medication Instructions    albuterol sulfate HFA (ProAir HFA) 108 (90 Base) MCG/ACT inhaler 2 puffs, Inhalation, Every 6 Hours PRN    Alcohol Swabs (DropSafe Alcohol Prep) 70 % pads     azelastine (ASTELIN) 0.1 % nasal spray 1 spray, Nasal, 2 Times Daily PRN, Use in each nostril as directed    cetirizine (ZYRTEC) 10 mg, Oral, Nightly    Continuous Blood Gluc Sensor (HitpostStyle Wesley 3 Sensor) misc     dilTIAZem (TIAZAC) 360 mg, Daily    diphenhydrAMINE (BENADRYL) 25 mg, Every 6 Hours PRN    escitalopram (LEXAPRO) 20 MG tablet No dose, route, or frequency recorded.    fluticasone (FLONASE) 50 MCG/ACT nasal spray 1 spray, 2 Times Daily    glucose blood test strip 1 each, Other, 3 Times Daily, Use as instructed     HYDROcodone-acetaminophen (NORCO) 5-325 MG per tablet 1 tablet, Every 6 Hours PRN    Insulin Pen Needle (PEN NEEDLES) 32G X 4 MM misc 1 each, Not Applicable, 2 Times Daily Before Meals    losartan (COZAAR) 100 mg, Oral, Daily    metFORMIN ER (GLUCOPHAGE-XR) 500 MG 24 hr tablet 1 tablet, Every 12 Hours Scheduled    Nebulizers (Vios Aerosol Delivery System) misc As Needed    NovoLOG FlexPen 100 UNIT/ML solution pen-injector sc pen     omeprazole (priLOSEC) 20 MG capsule TAKE ONE CAPSULE BY MOUTH ONCE DAILY    predniSONE (DELTASONE) 20 MG tablet Take 2 tablets daily for 5 days.    pregabalin (LYRICA) 75 MG capsule 1 capsule, 2 Times Daily     "solifenacin (VESICARE) 10 mg, Daily    tolterodine LA (DETROL LA) 4 MG 24 hr capsule TAKE 1 CAPSULE BY MOUTH EVERY DAY    triamterene-hydrochlorothiazide (MAXZIDE-25) 37.5-25 MG per tablet 1 tablet, Oral, Daily    TRUEplus Lancets 33G misc     Xarelto 20 mg, Oral, Daily       Vitals:    12/12/24 1131   BP: 112/70   BP Location: Right arm   Patient Position: Sitting   Cuff Size: Adult   Pulse: 81   SpO2: 96%   Weight: (!) 178 kg (392 lb)   Height: 170.2 cm (67\")     Body mass index is 61.4 kg/m².    PHYSICAL EXAM:    General Appearance:   well developed  well nourished  Neck:  thyroid not enlarged  supple  Respiratory:  no respiratory distress  normal breath sounds  no rales  Cardiovascular:  no jugular venous distention  regular rhythm  apical impulse normal  S1 normal, S2 normal  no S3, no S4   no murmur  no rub, no thrill  lower extremity edema: none    Skin:   warm, dry    RESULTS:     ECG 12 Lead    Date/Time: 12/12/2024 11:56 AM  Performed by: Boogie Wagner MD    Authorized by: Boogie Wagner MD  Comparison: compared with previous ECG from 10/31/2022  Similar to previous ECG  Rhythm: sinus rhythm  Rate: normal  BPM: 81  Conduction: left anterior fascicular block  Other findings: left ventricular hypertrophy with strain          Results for orders placed during the hospital encounter of 08/19/18    Adult Transthoracic Echo Complete W/ Cont if Necessary Per Protocol    Interpretation Summary  · Left atrial cavity size is mildly dilated.  · Mild tricuspid valve regurgitation is present.  · Calculated right ventricular systolic pressure from tricuspid regurgitation is 43 mmHg.  Labs:  Lab Results   Component Value Date    CHOL 156 06/30/2016    TRIG 126 12/12/2019    HDL 48 12/12/2019     (H) 12/12/2019    AST 13 06/11/2020    ALT 8 06/11/2020     Lab Results   Component Value Date    HGBA1C 8.8 (H) 12/12/2019     Creatinine   Date Value Ref Range Status   06/11/2020 0.87 0.57 - 1.00 mg/dL Final "   12/12/2019 0.67 0.57 - 1.00 mg/dL Final   05/20/2019 0.68 0.57 - 1.00 mg/dL Final   03/14/2019 0.73 0.60 - 1.30 mg/dL Final     eGFR Non  Am   Date Value Ref Range Status   12/12/2019 104 >59 mL/min/1.73 Final     eGFR Non  Amer   Date Value Ref Range Status   06/11/2020 69 >60 mL/min/1.73 Final   05/20/2019 93 >60 mL/min/1.73 Final   03/14/2019 85 >60 mL/min/1.73 Final         ASSESSMENT:  Problem List Items Addressed This Visit       Mixed hyperlipidemia (Chronic)    Benign essential hypertension (Chronic)    PAF (paroxysmal atrial fibrillation) - Primary (Chronic)    Overview     8/20/18 Echo: EF 69%, mildy dilated L atrial cavity, mild TR              PLAN:   1.  History of abnormal stress test:  2014 abnormal stress test with follow-up normal left heart catheterization.  Having worsening fatigue and weakness with activities, will check stress/pet imaging and echocardiogram to rule out cardiovascular causes     2.  Paroxysmal atrial fibrillation:  Continue anticoagulation with Xarelto, refill today.  Repeat echo due to worsening weakness and fatigue.     3.  Hyperlipidemia:  Lipid panel 7/2022 showed total cholesterol 218,   Given diabetic status we will start rosuvastatin 5 mg daily, she was previously intolerant to pravastatin.     4.  Hypertension:  Long-term goal blood pressure is 130/80  Continue current medical therapy  Low-sodium diet and exercise recommend     5.  LUCERO:  Discussed importance of compliance with positive airway pressure     6.  Uncontrolled diabetes mellitus  A1c not at goal per patient  Discussed importance of low glycemic index diet, weight loss and exercise as well as medication compliance  Consider endocrinology consultation.     7.  Morbid obesity:  We discussed the importance of weight loss and how it will help decreased incidence of further A. fib  She is working on weight loss with diet and exercise.  Consider GLP-1 agonist.        Follow-up   Return in about  1 year (around 12/12/2025).      Boogie Wagner MD, FAC, [facility]  Interventional Cardiology

## 2024-12-13 RX ORDER — DILTIAZEM HYDROCHLORIDE 360 MG/1
360 CAPSULE, EXTENDED RELEASE ORAL DAILY
Qty: 90 CAPSULE | Refills: 3 | Status: SHIPPED | OUTPATIENT
Start: 2024-12-13

## 2024-12-13 RX ORDER — RIVAROXABAN 20 MG/1
20 TABLET, FILM COATED ORAL DAILY
Qty: 90 TABLET | Refills: 3 | Status: SHIPPED | OUTPATIENT
Start: 2024-12-13

## 2024-12-28 ENCOUNTER — HOSPITAL ENCOUNTER (EMERGENCY)
Facility: HOSPITAL | Age: 54
Discharge: HOME | End: 2024-12-28
Attending: EMERGENCY MEDICINE
Payer: MEDICARE

## 2024-12-28 ENCOUNTER — APPOINTMENT (OUTPATIENT)
Dept: CT IMAGING | Facility: HOSPITAL | Age: 54
End: 2024-12-28
Payer: MEDICARE

## 2024-12-28 VITALS
HEIGHT: 67 IN | BODY MASS INDEX: 45.99 KG/M2 | OXYGEN SATURATION: 96 % | RESPIRATION RATE: 18 BRPM | SYSTOLIC BLOOD PRESSURE: 134 MMHG | DIASTOLIC BLOOD PRESSURE: 83 MMHG | TEMPERATURE: 98.2 F | HEART RATE: 107 BPM | WEIGHT: 293 LBS

## 2024-12-28 DIAGNOSIS — R22.0 FACIAL SWELLING: Primary | ICD-10-CM

## 2024-12-28 DIAGNOSIS — L03.211 FACIAL CELLULITIS: ICD-10-CM

## 2024-12-28 LAB
ANION GAP SERPL CALCULATED.3IONS-SCNC: 16.1 MMOL/L (ref 5–15)
BASOPHILS # BLD AUTO: 0.06 10*3/MM3 (ref 0–0.2)
BASOPHILS NFR BLD AUTO: 0.4 % (ref 0–1.5)
BUN SERPL-MCNC: 15 MG/DL (ref 6–20)
BUN/CREAT SERPL: 20.8 (ref 7–25)
CALCIUM SPEC-SCNC: 9.2 MG/DL (ref 8.6–10.5)
CHLORIDE SERPL-SCNC: 99 MMOL/L (ref 98–107)
CO2 SERPL-SCNC: 22.9 MMOL/L (ref 22–29)
CREAT SERPL-MCNC: 0.72 MG/DL (ref 0.57–1)
DEPRECATED RDW RBC AUTO: 38.5 FL (ref 37–54)
EGFRCR SERPLBLD CKD-EPI 2021: 100.1 ML/MIN/1.73
EOSINOPHIL # BLD AUTO: 0.31 10*3/MM3 (ref 0–0.4)
EOSINOPHIL NFR BLD AUTO: 2.3 % (ref 0.3–6.2)
ERYTHROCYTE [DISTWIDTH] IN BLOOD BY AUTOMATED COUNT: 13 % (ref 12.3–15.4)
GLUCOSE SERPL-MCNC: 272 MG/DL (ref 65–99)
HCT VFR BLD AUTO: 47.4 % (ref 34–46.6)
HGB BLD-MCNC: 15.9 G/DL (ref 12–15.9)
IMM GRANULOCYTES # BLD AUTO: 0.09 10*3/MM3 (ref 0–0.05)
IMM GRANULOCYTES NFR BLD AUTO: 0.7 % (ref 0–0.5)
LYMPHOCYTES # BLD AUTO: 3.22 10*3/MM3 (ref 0.7–3.1)
LYMPHOCYTES NFR BLD AUTO: 23.5 % (ref 19.6–45.3)
MCH RBC QN AUTO: 27.5 PG (ref 26.6–33)
MCHC RBC AUTO-ENTMCNC: 33.5 G/DL (ref 31.5–35.7)
MCV RBC AUTO: 82 FL (ref 79–97)
MONOCYTES # BLD AUTO: 0.77 10*3/MM3 (ref 0.1–0.9)
MONOCYTES NFR BLD AUTO: 5.6 % (ref 5–12)
NEUTROPHILS NFR BLD AUTO: 67.5 % (ref 42.7–76)
NEUTROPHILS NFR BLD AUTO: 9.24 10*3/MM3 (ref 1.7–7)
NRBC BLD AUTO-RTO: 0 /100 WBC (ref 0–0.2)
PLATELET # BLD AUTO: 398 10*3/MM3 (ref 140–450)
PMV BLD AUTO: 9.4 FL (ref 6–12)
POTASSIUM SERPL-SCNC: 3.9 MMOL/L (ref 3.5–5.2)
RBC # BLD AUTO: 5.78 10*6/MM3 (ref 3.77–5.28)
SODIUM SERPL-SCNC: 138 MMOL/L (ref 136–145)
WBC NRBC COR # BLD AUTO: 13.69 10*3/MM3 (ref 3.4–10.8)

## 2024-12-28 PROCEDURE — 80048 BASIC METABOLIC PNL TOTAL CA: CPT

## 2024-12-28 PROCEDURE — 85025 COMPLETE CBC W/AUTO DIFF WBC: CPT

## 2024-12-28 PROCEDURE — 70491 CT SOFT TISSUE NECK W/DYE: CPT

## 2024-12-28 PROCEDURE — 25010000002 KETOROLAC TROMETHAMINE PER 15 MG

## 2024-12-28 PROCEDURE — 25010000002 CEFTRIAXONE PER 250 MG

## 2024-12-28 PROCEDURE — 25010000002 DIPHENHYDRAMINE PER 50 MG

## 2024-12-28 PROCEDURE — 96365 THER/PROPH/DIAG IV INF INIT: CPT

## 2024-12-28 PROCEDURE — 96375 TX/PRO/DX INJ NEW DRUG ADDON: CPT

## 2024-12-28 PROCEDURE — 25510000001 IOPAMIDOL 61 % SOLUTION: Performed by: EMERGENCY MEDICINE

## 2024-12-28 PROCEDURE — 99285 EMERGENCY DEPT VISIT HI MDM: CPT | Performed by: EMERGENCY MEDICINE

## 2024-12-28 RX ORDER — FAMOTIDINE 10 MG/ML
20 INJECTION, SOLUTION INTRAVENOUS ONCE
Status: COMPLETED | OUTPATIENT
Start: 2024-12-28 | End: 2024-12-28

## 2024-12-28 RX ORDER — KETOROLAC TROMETHAMINE 30 MG/ML
15 INJECTION, SOLUTION INTRAMUSCULAR; INTRAVENOUS ONCE
Status: COMPLETED | OUTPATIENT
Start: 2024-12-28 | End: 2024-12-28

## 2024-12-28 RX ORDER — DIPHENHYDRAMINE HYDROCHLORIDE 50 MG/ML
25 INJECTION INTRAMUSCULAR; INTRAVENOUS ONCE
Status: COMPLETED | OUTPATIENT
Start: 2024-12-28 | End: 2024-12-28

## 2024-12-28 RX ORDER — SODIUM CHLORIDE 0.9 % (FLUSH) 0.9 %
10 SYRINGE (ML) INJECTION AS NEEDED
Status: DISCONTINUED | OUTPATIENT
Start: 2024-12-28 | End: 2024-12-28 | Stop reason: HOSPADM

## 2024-12-28 RX ORDER — DOXYCYCLINE 100 MG/1
100 CAPSULE ORAL 2 TIMES DAILY
Qty: 20 CAPSULE | Refills: 0 | Status: SHIPPED | OUTPATIENT
Start: 2024-12-28 | End: 2025-01-07

## 2024-12-28 RX ORDER — IOPAMIDOL 612 MG/ML
100 INJECTION, SOLUTION INTRAVASCULAR
Status: COMPLETED | OUTPATIENT
Start: 2024-12-28 | End: 2024-12-28

## 2024-12-28 RX ADMIN — FAMOTIDINE 20 MG: 10 INJECTION, SOLUTION INTRAVENOUS at 17:40

## 2024-12-28 RX ADMIN — IOPAMIDOL 100 ML: 612 INJECTION, SOLUTION INTRAVENOUS at 18:14

## 2024-12-28 RX ADMIN — KETOROLAC TROMETHAMINE 15 MG: 30 INJECTION, SOLUTION INTRAMUSCULAR; INTRAVENOUS at 17:40

## 2024-12-28 RX ADMIN — SODIUM CHLORIDE 2000 MG: 9 INJECTION, SOLUTION INTRAVENOUS at 19:38

## 2024-12-28 RX ADMIN — DIPHENHYDRAMINE HYDROCHLORIDE 25 MG: 50 INJECTION, SOLUTION INTRAMUSCULAR; INTRAVENOUS at 17:40

## 2024-12-28 NOTE — ED PROVIDER NOTES
EMERGENCY DEPARTMENT ENCOUNTER    Pt Name: Azalea Mendoza  MRN: [id number]  Pt :   1970  Room Number:  02SF/02  Date of encounter:  2024  PCP: Nallely Frazier MD  ED Provider: Caleb Edmonds PA-C    Historian: Patient,  at bedside, nursing notes      HPI:  Chief Complaint: Tongue swelling, facial swelling        Context: Azalea Mendoza is a 54 y.o. female who presents to the ED c/o tone and facial swelling that started late last night.  Patient states she ate a ham sandwich and afterwards noticed swelling of her lower lip that then extended to her right sided tongue and under her chin.  Patient states she is able to tolerate her own secretions and can eat drink and breathe normally.  She denies any chest pain or shortness of breath.  Patient denies any cough or wheezing.  Patient states similar episodes of tongue and lip swelling have happened 4-5 times over the last several years and typically resolve after steroids, but she went to urgent treatment center at 10 AM this morning and they gave her a steroid shot and it has not helped in fact symptoms have worsened since then.      PAST MEDICAL HISTORY  Past Medical History:   Diagnosis Date    A-fib     follows w/ Dr. Chau, on Eliquis    Allergic rhinitis due to pollen     Anxiety     Arrhythmia     Asthma     mild, prn inhalers only, follows w/ Dr. Schneider     Back pain, lumbosacral     Cervicalgia     Diabetic neuropathy     DM2 (diabetes mellitus, type 2)     dx , on insulin 2+years, follows w/ Endocrinology, A1C >9    Dyslipidemia     Dyspepsia     Dyspnea on exertion     Fatigue     Gallstones     GERD (gastroesophageal reflux disease)     daily Omeprazole 20mg, controlled, denies recent EGD     H/O cystoscopy     diagnostic cystoscopy    Heart murmur     Hyperparathyroidism     s/p (R) parathyroidectomy 2019 @[place]    Hypertension     IBS (irritable bowel syndrome)     exacerbated w/ salads and/or greasy foods, does  "not follow w/ GI    Lump or mass in breast     last mammogram 1/2019 [place]    Lung nodule     RUL, 8mm, repeat Chest CT planned 12/2019    Morbid obesity with BMI of 50.0-59.9, adult     Muscle spasm     Osteoarthritis     takes prn Tylenol, denies NSAIDS/steroids    Ovarian cancer     dx 2000, s/p TAHBSO, no chemo/XRT    Overactive bladder     on Detrol    Primary hyperparathyroidism 06/11/2018    Right knee pain     pursuing \"nerve block\" w/ pain management, avoids NSAIDS/steroids    Shingles     Sleep apnea     CPAP compliant    Thyroid nodule     follows w/ Dr. Yin @, conservative management         PAST SURGICAL HISTORY  Past Surgical History:   Procedure Laterality Date    CARDIAC CATHETERIZATION      COLONOSCOPY  2013    CYST REMOVAL  03/01/2024    HX OVARIAN CYSTECTOMY  1997    (open) lower transverse incision    LAPAROSCOPIC OVARIAN CYSTECTOMY  2000    w/ CA dx    PARATHYROIDECTOMY  09/22/2019    (R) parathyroidectomy w/ Dr. Yin @     TOTAL ABDOMINAL HYSTERECTOMY WITH SALPINGO OOPHORECTOMY  09/2000    ovarian CA         FAMILY HISTORY  Family History   Problem Relation Age of Onset    Hypertension Mother     Arthritis Mother     Arthritis Father     Hypertension Father     Hyperlipidemia Father     Diabetes Father         type 2    Stroke Father     Obesity Father     Heart attack Father     Heart failure Father     Cervical cancer Maternal Grandmother     Heart attack Maternal Grandfather     Leukemia Paternal Grandmother     Alzheimer's disease Paternal Grandfather          SOCIAL HISTORY  Social History     Socioeconomic History    Marital status:    Tobacco Use    Smoking status: Never    Smokeless tobacco: Never   Vaping Use    Vaping status: Never Used    Passive vaping exposure: Yes   Substance and Sexual Activity    Alcohol use: No    Drug use: No    Sexual activity: Not Currently     Partners: Male     Birth control/protection: Hysterectomy     Comment: Hysterectomy         ALLERGIES  Fish " allergy, Pravastatin, Shellfish allergy, Nuts, Rosuvastatin, Ciprofloxacin-dexamethasone, Egg-derived products, Neomycin-bacitracin zn-polymyx, and Penicillins        REVIEW OF SYSTEMS  Review of Systems   Constitutional:  Negative for chills and fever.   HENT:  Negative for congestion and sore throat.    Respiratory:  Negative for cough and shortness of breath.    Cardiovascular:  Negative for chest pain.   Gastrointestinal:  Negative for abdominal pain, nausea and vomiting.   Genitourinary:  Negative for dysuria.   Musculoskeletal:  Negative for back pain.   Skin:  Negative for wound.   Neurological:  Negative for dizziness and headaches.   Psychiatric/Behavioral:  Negative for confusion.    All other systems reviewed and are negative.         All systems reviewed and negative except for those discussed in HPI.       PHYSICAL EXAM    I have reviewed the triage vital signs and nursing notes.    ED Triage Vitals [12/28/24 1631]   Temp Heart Rate Resp BP SpO2   98.2 °F (36.8 °C) 107 18 (!) 188/110 92 %      Temp src Heart Rate Source Patient Position BP Location FiO2 (%)   Oral Monitor Sitting Left arm --       Physical Exam        LAB RESULTS  No results found for this or any previous visit (from the past 24 hours).    If labs were ordered, I independently reviewed the results and considered them in treating the patient.        RADIOLOGY  No Radiology Exams Resulted Within Past 24 Hours    I ordered and independently reviewed the above noted radiographic studies.      I viewed images of CT neck soft tissue which showed soft tissue swelling consistent with cellulitis over mandible noted, no abscess per my independent interpretation.    See radiologist's dictation for official interpretation.        PROCEDURES    Procedures    No orders to display       MEDICATIONS GIVEN IN ER    Medications   ketorolac (TORADOL) injection 15 mg (15 mg Intravenous Given 12/28/24 1740)   diphenhydrAMINE (BENADRYL) injection 25 mg (25  mg Intravenous Given 12/28/24 1740)   famotidine (PEPCID) injection 20 mg (20 mg Intravenous Given 12/28/24 1740)   iopamidol (ISOVUE-300) 61 % injection 100 mL (100 mL Intravenous Given 12/28/24 1814)   cefTRIAXone (ROCEPHIN) 2,000 mg in sodium chloride 0.9 % 100 mL IVPB-VTB (0 mg Intravenous Stopped 12/28/24 2008)         MEDICAL DECISION MAKING, PROGRESS, and CONSULTS    All labs, if obtained, have been independently reviewed by me.  All radiology studies, if obtained, have been reviewed by me and the radiologist dictating the report.  All EKG's, if obtained, have been independently viewed and interpreted by me/my attending physician.      Discussion below represents my analysis of pertinent findings related to patient's condition, differential diagnosis, treatment plan and final disposition.    54-year-old female presenting for evaluation of facial swelling swelling of the tongue and under her chin.  Given concern for possible submandibular abscess or other acute abnormality a CT neck soft tissue scan was ordered patient was given Benadryl and Toradol as she was already given IV steroids several hours ago at urgent treatment center.  CBC showed a leukocytosis normal hemoglobin and CMP notable for hyperglycemia with a mildly elevated anion gap and normal CO2.  CT neck soft tissue showed findings consistent with cellulitis over the mandible but no abscess or other acute abnormalities with no obvious airway compromise.  The patient has been oxygenating normally, her vital signs and pulse oximetry as independently interpreted by me are all stable within normal limits, and I have no suspicion for acute airway compromise.  Findings are not necessarily consistent with angioedema.  Given IV Rocephin to treat cellulitis prescribed antibiotics at discharge along with Pepcid Benadryl and steroids which she has at home already.  Strict ED return precautions were explained and the patient verbalized understanding of and  agreement this plan.  I encourage very close monitoring of her blood sugar given she is a type II diabetic and taking steroids and to discontinue steroids should her blood sugar become unmanageable and follow-up closely with her primary care provider which she verbalized understanding of and agreement with.                     Differential diagnosis:    Differential diagnosis included but was not limited to angioedema, facial cellulitis, abscess      Additional sources:    - Discussed/ obtained information from independent historians:  at bedside    - External (non-ED) record review: Primary care records and urgent treatment center provider note from earlier today noted by me showing patient was prescribed steroids and given Pepcid and Benadryl today    - Chronic or social conditions impacting care: None    Orders placed during this visit:  Orders Placed This Encounter   Procedures    CT Soft Tissue Neck With Contrast    Basic Metabolic Panel    CBC Auto Differential    CBC & Differential         Additional orders considered but not ordered: None      ED Course:    Consultants: None    ED Course as of 12/30/24 1635   Sat Dec 28, 2024   1754 WBC(!): 13.69 [TG]      ED Course User Index  [TG] Caleb Edmonds PA-C              Shared Decision Making:  After my consideration of clinical presentation and any laboratory/radiology studies obtained, I discussed the findings with the patient/patient representative who is in agreement with the treatment plan and the final disposition.   Risks and benefits of discharge and/or observation/admission were discussed.       AS OF 16:35 EST VITALS:    BP - 134/83  HR - 107  TEMP - 98.2 °F (36.8 °C) (Oral)  O2 SATS - 96%                  DIAGNOSIS  Final diagnoses:   Facial swelling   Facial cellulitis         DISPOSITION  Discharge home      Please note that portions of this document were completed with voice recognition software.      Caleb Edmonds PA-C  12/30/24  [id number]

## 2024-12-29 NOTE — DISCHARGE INSTRUCTIONS
Continue taking your Pepcid and take 50 mg of Benadryl every 6 hours, continue taking your steroid pack as prescribed.  Return to the ER for any difficulty breathing, worsening facial swelling, worsening swelling of the lips tongue or throat, difficulty eating drinking or tolerating her own secretions.  Otherwise we recommend follow-up with your primary care provider Monday or Tuesday.

## 2025-01-16 RX ORDER — RIVAROXABAN 20 MG/1
20 TABLET, FILM COATED ORAL DAILY
Qty: 90 TABLET | Refills: 3 | Status: SHIPPED | OUTPATIENT
Start: 2025-01-16

## 2025-01-16 NOTE — TELEPHONE ENCOUNTER
Lab Results   Component Value Date    WBC 13.69 (H) 12/28/2024    HGB 15.9 12/28/2024    HCT 47.4 (H) 12/28/2024    MCV 82.0 12/28/2024     12/28/2024

## 2025-01-30 ENCOUNTER — OFFICE VISIT (OUTPATIENT)
Dept: PULMONOLOGY | Facility: CLINIC | Age: 55
End: 2025-01-30
Payer: MEDICARE

## 2025-01-30 VITALS
SYSTOLIC BLOOD PRESSURE: 122 MMHG | RESPIRATION RATE: 18 BRPM | DIASTOLIC BLOOD PRESSURE: 78 MMHG | OXYGEN SATURATION: 96 % | WEIGHT: 293 LBS | HEIGHT: 66 IN | BODY MASS INDEX: 47.09 KG/M2 | HEART RATE: 83 BPM

## 2025-01-30 DIAGNOSIS — J45.20 MILD INTERMITTENT ASTHMA WITHOUT COMPLICATION: ICD-10-CM

## 2025-01-30 DIAGNOSIS — E66.01 MORBID OBESITY WITH BMI OF 60.0-69.9, ADULT: ICD-10-CM

## 2025-01-30 DIAGNOSIS — J30.89 OTHER ALLERGIC RHINITIS: ICD-10-CM

## 2025-01-30 DIAGNOSIS — G47.33 OSA (OBSTRUCTIVE SLEEP APNEA): Primary | ICD-10-CM

## 2025-01-30 DIAGNOSIS — R06.02 SOB (SHORTNESS OF BREATH): ICD-10-CM

## 2025-01-30 PROCEDURE — 3078F DIAST BP <80 MM HG: CPT | Performed by: NURSE PRACTITIONER

## 2025-01-30 PROCEDURE — 99214 OFFICE O/P EST MOD 30 MIN: CPT | Performed by: NURSE PRACTITIONER

## 2025-01-30 PROCEDURE — 3074F SYST BP LT 130 MM HG: CPT | Performed by: NURSE PRACTITIONER

## 2025-01-30 RX ORDER — ALBUTEROL SULFATE 90 UG/1
2 INHALANT RESPIRATORY (INHALATION) EVERY 6 HOURS PRN
Qty: 18 G | Refills: 2 | Status: SHIPPED | OUTPATIENT
Start: 2025-01-30 | End: 2025-01-30

## 2025-01-30 RX ORDER — ALBUTEROL SULFATE 90 UG/1
2 INHALANT RESPIRATORY (INHALATION) EVERY 4 HOURS PRN
Qty: 18 G | Refills: 2 | Status: SHIPPED | OUTPATIENT
Start: 2025-01-30

## 2025-01-30 NOTE — PROGRESS NOTES
"Chief Complaint   Patient presents with    Sleeping Problem    Follow-up         Subjective   Azalea Mendoza is a 54 y.o. female.     Patient is here today for follow up of asthma, shortness of breath, and LUCERO.     Patient says that since the last office visit she has had no recent exacerbations. she denies any emergency room visits or hospitalizations, due to her asthma.     The patient says that she is compliant with pulmonary medicines, as prescribed. She has not needed ADELITA at all. She takes allergy medication daily.     She uses nasal spray as needed.     She feels rested with CPAP machine and uses it nightly.  She received a new CPAP machine and has been doing well with the new machine.  She continues using nasal pillows but is having some difficulty keeping the headgear on her head.      The following portions of the patient's history were reviewed and updated as appropriate: allergies, current medications, past family history, past medical history, past social history, and past surgical history.    Review of Systems   HENT:  Negative for sinus pressure, sneezing and sore throat.    Respiratory:  Negative for cough, chest tightness, shortness of breath and wheezing.        Objective   Visit Vitals  /78   Pulse 83   Resp 18   Ht 167.6 cm (66\") Comment: pt reported   Wt (!) 173 kg (381 lb)   SpO2 96%   BMI 61.50 kg/m²         Physical Exam  Vitals reviewed.   HENT:      Head: Atraumatic.      Mouth/Throat:      Mouth: Mucous membranes are moist.      Comments: Crowded oropharynx.  Eyes:      Extraocular Movements: Extraocular movements intact.   Cardiovascular:      Rate and Rhythm: Normal rate and regular rhythm.   Pulmonary:      Effort: Pulmonary effort is normal. No respiratory distress.      Breath sounds: No wheezing.   Abdominal:      Comments: Obese abdomen.    Skin:     General: Skin is warm.   Neurological:      Mental Status: She is alert and oriented to person, place, and time. "             Assessment & Plan   Diagnoses and all orders for this visit:    1. LUCERO (obstructive sleep apnea) (Primary)    2. Mild intermittent asthma without complication    3. Morbid obesity with BMI of 60.0-69.9, adult    4. Other allergic rhinitis    Other orders  -     Discontinue: albuterol sulfate HFA (ProAir HFA) 108 (90 Base) MCG/ACT inhaler; Inhale 2 puffs Every 6 (Six) Hours As Needed for Wheezing.  Dispense: 18 g; Refill: 2  -     albuterol sulfate HFA (Ventolin HFA) 108 (90 Base) MCG/ACT inhaler; Inhale 2 puffs Every 4 (Four) Hours As Needed for Wheezing or Shortness of Air.  Dispense: 18 g; Refill: 2           Return in about 6 months (around 7/30/2025) for Recheck, For Dr. Schneider.    DISCUSSION (if any):  PFT today consistent with no obstruction.  No restriction without suggestion of air trapping.  Preserved diffusion capacity.    Her symptoms of asthma are under adequate control at this time. She should continue ADELITA as needed.     Continue antihistamine for allergic rhinitis and nasal spray as needed.     Patient's medications for underlying asthma were reviewed with her in great detail.    Any needed adjustments to her pulmonary medications, either for clinical or insurance coverage reasons, have been made and are reflected in the orders.    Side effects of prescribed medications discussed with the patient.    Asthma action plan with discussed with her.    The patient was asked to call this office if the symptoms worsen.     Split night study from 2010   AHI 27 per hour  AutoPAP 10/20  DME: [employer]    Current mask: nasal pillows    Continue treatment with AutoPAP at a pressure of 10-20, with a nasal pillows.    Patient's compliance data was reviewed and the compliance is 100%.    Humidification setup, hose and mask care discussed.    Weight loss advised.    Use every night for at least 4 hours stressed.     She has allergy to egg and has not been able to get an egg free flu vaccine. She has asked  her pharmacy and they have not had the vaccine available.     Dictated utilizing Dragon dictation.    This document was electronically signed by JARRET Robert January 30, 2025  10:56 EST

## 2025-02-17 ENCOUNTER — PREP FOR SURGERY (OUTPATIENT)
Dept: OTHER | Facility: HOSPITAL | Age: 55
End: 2025-02-17

## 2025-02-17 DIAGNOSIS — Z12.11 ENCOUNTER FOR SCREENING COLONOSCOPY: Primary | ICD-10-CM

## 2025-05-12 ENCOUNTER — TELEPHONE (OUTPATIENT)
Age: 55
End: 2025-05-12
Payer: MEDICARE

## 2025-05-12 NOTE — TELEPHONE ENCOUNTER
Hub staff attempted to follow warm transfer process and was unsuccessful     Caller: KAMILLE    Relationship to patient: [facility]     Best call back number: [phone]    Patient is needing: HE IS NEEDING TO KNOW WHEN THE LAST TIME PT HAS BEEN IN

## 2025-05-19 DIAGNOSIS — J45.20 MILD INTERMITTENT ASTHMA WITHOUT COMPLICATION: Primary | ICD-10-CM

## 2025-05-19 RX ORDER — ALBUTEROL SULFATE 90 UG/1
2 INHALANT RESPIRATORY (INHALATION) EVERY 4 HOURS PRN
Qty: 18 G | Refills: 2 | Status: SHIPPED | OUTPATIENT
Start: 2025-05-19

## 2025-06-09 ENCOUNTER — OFFICE VISIT (OUTPATIENT)
Age: 55
End: 2025-06-09
Payer: MEDICARE

## 2025-06-09 VITALS
TEMPERATURE: 98 F | OXYGEN SATURATION: 98 % | HEIGHT: 66 IN | DIASTOLIC BLOOD PRESSURE: 97 MMHG | BODY MASS INDEX: 47.09 KG/M2 | SYSTOLIC BLOOD PRESSURE: 160 MMHG | WEIGHT: 293 LBS | RESPIRATION RATE: 18 BRPM | HEART RATE: 87 BPM

## 2025-06-09 DIAGNOSIS — K21.9 GASTROESOPHAGEAL REFLUX DISEASE WITHOUT ESOPHAGITIS: ICD-10-CM

## 2025-06-09 DIAGNOSIS — Z79.4 TYPE 2 DIABETES MELLITUS WITH HYPERGLYCEMIA, WITH LONG-TERM CURRENT USE OF INSULIN: ICD-10-CM

## 2025-06-09 DIAGNOSIS — I48.0 PAF (PAROXYSMAL ATRIAL FIBRILLATION): Chronic | ICD-10-CM

## 2025-06-09 DIAGNOSIS — E78.2 MIXED HYPERLIPIDEMIA: ICD-10-CM

## 2025-06-09 DIAGNOSIS — Z12.11 SCREEN FOR COLON CANCER: ICD-10-CM

## 2025-06-09 DIAGNOSIS — R10.11 RIGHT UPPER QUADRANT ABDOMINAL PAIN: Primary | ICD-10-CM

## 2025-06-09 DIAGNOSIS — E11.65 TYPE 2 DIABETES MELLITUS WITH HYPERGLYCEMIA, WITH LONG-TERM CURRENT USE OF INSULIN: ICD-10-CM

## 2025-06-09 DIAGNOSIS — I10 BENIGN ESSENTIAL HYPERTENSION: Chronic | ICD-10-CM

## 2025-06-09 DIAGNOSIS — N32.81 OVERACTIVE BLADDER: ICD-10-CM

## 2025-06-09 DIAGNOSIS — T78.3XXD ANGIOEDEMA, SUBSEQUENT ENCOUNTER: ICD-10-CM

## 2025-06-09 DIAGNOSIS — E66.01 MORBID OBESITY: ICD-10-CM

## 2025-06-09 PROBLEM — G89.29 CHRONIC PAIN: Status: ACTIVE | Noted: 2024-07-15

## 2025-06-09 PROBLEM — F32.A DEPRESSIVE DISORDER: Status: ACTIVE | Noted: 2019-11-13

## 2025-06-09 PROBLEM — E11.42 DIABETIC PERIPHERAL NEUROPATHY ASSOCIATED WITH TYPE 2 DIABETES MELLITUS: Status: ACTIVE | Noted: 2023-10-23

## 2025-06-09 PROBLEM — M19.90 OSTEOARTHRITIS: Status: ACTIVE | Noted: 2019-11-13

## 2025-06-09 PROCEDURE — 1126F AMNT PAIN NOTED NONE PRSNT: CPT | Performed by: INTERNAL MEDICINE

## 2025-06-09 PROCEDURE — 1160F RVW MEDS BY RX/DR IN RCRD: CPT | Performed by: INTERNAL MEDICINE

## 2025-06-09 PROCEDURE — 1159F MED LIST DOCD IN RCRD: CPT | Performed by: INTERNAL MEDICINE

## 2025-06-09 PROCEDURE — G2211 COMPLEX E/M VISIT ADD ON: HCPCS | Performed by: INTERNAL MEDICINE

## 2025-06-09 PROCEDURE — 99214 OFFICE O/P EST MOD 30 MIN: CPT | Performed by: INTERNAL MEDICINE

## 2025-06-09 PROCEDURE — 3077F SYST BP >= 140 MM HG: CPT | Performed by: INTERNAL MEDICINE

## 2025-06-09 PROCEDURE — 3080F DIAST BP >= 90 MM HG: CPT | Performed by: INTERNAL MEDICINE

## 2025-06-09 RX ORDER — ESCITALOPRAM OXALATE 20 MG/1
20 TABLET ORAL DAILY
Qty: 90 TABLET | Refills: 1 | Status: SHIPPED | OUTPATIENT
Start: 2025-06-09

## 2025-06-09 RX ORDER — PANTOPRAZOLE SODIUM 40 MG/1
TABLET, DELAYED RELEASE ORAL
COMMUNITY
Start: 2025-04-09 | End: 2025-06-09 | Stop reason: SDUPTHER

## 2025-06-09 RX ORDER — NICOTINE POLACRILEX 2 MG
1 MINI LOZENGE BUCCAL DAILY
COMMUNITY
Start: 2025-04-30

## 2025-06-09 RX ORDER — ESCITALOPRAM OXALATE 20 MG/1
20 TABLET ORAL DAILY
Qty: 90 TABLET | Refills: 1 | Status: SHIPPED | OUTPATIENT
Start: 2025-06-09 | End: 2025-06-09

## 2025-06-09 RX ORDER — MONTELUKAST SODIUM 10 MG/1
1 TABLET ORAL DAILY
COMMUNITY
Start: 2025-04-23

## 2025-06-09 RX ORDER — METFORMIN HYDROCHLORIDE 500 MG/1
TABLET, EXTENDED RELEASE ORAL
COMMUNITY
Start: 2025-05-05

## 2025-06-09 RX ORDER — PANTOPRAZOLE SODIUM 40 MG/1
40 TABLET, DELAYED RELEASE ORAL DAILY
Qty: 90 TABLET | Refills: 1 | Status: SHIPPED | OUTPATIENT
Start: 2025-06-09

## 2025-06-09 RX ORDER — DOXAZOSIN 1 MG/1
1 TABLET ORAL NIGHTLY
Qty: 90 TABLET | Refills: 1 | Status: SHIPPED | OUTPATIENT
Start: 2025-06-09 | End: 2025-06-16 | Stop reason: SDUPTHER

## 2025-06-09 RX ORDER — INSULIN GLARGINE 300 U/ML
INJECTION, SOLUTION SUBCUTANEOUS
COMMUNITY
Start: 2025-05-20

## 2025-06-09 NOTE — PROGRESS NOTES
[facility]     Chief Complaint  [facility]    Azalea Mendoza is a 54 y.o. female who presents today to [facility] for a follow up visit for [facility].    HPI:   Patient reports her diabetes has not been well controlled. She had an A1C that was 13. She started on Mounjaro with Endo for 6 weeks. She tolerated 2.5mg well with 15 lb weight loss and 2 point drop in A1C. She went to 5mg dose and had sulfur burps and nausea. She had to stop the medication due to this. She believes she was therapeutic on 2.5mg dose. She uses a Dexcom. She drinks 5 diet sodas per day.She takes metformin, novolog, and long acting insulin.    She had an episode of angioedema on December 12/29/24 and went to [facility]. She has had some similar episodes in the past. She has been on losartan.    She has had some pain in her right shoulder and some contracture of her right hand. She has dome some PT and dry needling on the right shoulder. She has some right foot dragging when she walks. She has done an MRI of her T and L spine and will do this with [place].She has been seeing a shoulder specialist with [facility]. She sees [facility] pain tomorrow.  She takes hydrocodone with her pain meds for her knees and pregabalin. She sees Dr. Oconnell.    She ate some mexican yesterday. She has been fatigued. Decreasing Lexapro did not help. She has been sleeping a long time still did not help.    She has saw Dr. Schneider and had her PFTs which were stable in spring 2025. She is compliant with her CPAP.     She has GERD that is well controlled with pantoprazole.    She has been taking Lexaprol    She has OAB and takes vesicare which helps.     She has had some heart palpitations and felt with her PVCs. She has been having some cramps in her right foot for 3 weeks.          Previous History:   Past Medical History:   Diagnosis Date    A-fib     follows w/ Dr. Chau, on Eliquis    Allergic rhinitis due to pollen  "    Anxiety     Arrhythmia     Asthma     mild, prn inhalers only, follows w/ Dr. Schneider     Back pain, lumbosacral     Cervicalgia     Diabetic neuropathy     DM2 (diabetes mellitus, type 2)     dx 2005, on insulin 2+years, follows w/ Endocrinology, A1C >9    Dyslipidemia     Dyspepsia     Dyspnea on exertion     Fatigue     Gallstones     GERD (gastroesophageal reflux disease)     daily Omeprazole 20mg, controlled, denies recent EGD     H/O cystoscopy     diagnostic cystoscopy    Heart murmur     Hyperparathyroidism     s/p (R) parathyroidectomy 9/2019 @[facility]    Hypertension     IBS (irritable bowel syndrome)     exacerbated w/ salads and/or greasy foods, does not follow w/ GI    Lump or mass in breast     last mammogram 1/2019 [place]    Lung nodule     RUL, 8mm, repeat Chest CT planned 12/2019    Morbid obesity with BMI of 50.0-59.9, adult     Muscle spasm     Osteoarthritis     takes prn Tylenol, denies NSAIDS/steroids    Ovarian cancer     dx 2000, s/p TAHBSO, no chemo/XRT    Overactive bladder     on Detrol    Primary hyperparathyroidism 06/11/2018    Right knee pain     pursuing \"nerve block\" w/ pain management, avoids NSAIDS/steroids    Shingles     Shoulder blade pain     Sleep apnea     CPAP compliant    Tendonitis     Thyroid nodule     follows w/ Dr. Yin @, conservative management      Past Surgical History:   Procedure Laterality Date    CARDIAC CATHETERIZATION      COLONOSCOPY  2013    CYST REMOVAL  03/01/2024    HX OVARIAN CYSTECTOMY  1997    (open) lower transverse incision    LAPAROSCOPIC OVARIAN CYSTECTOMY  2000    w/ CA dx    PARATHYROIDECTOMY  09/22/2019    (R) parathyroidectomy w/ Dr. Yin @     TOTAL ABDOMINAL HYSTERECTOMY WITH SALPINGO OOPHORECTOMY  09/2000    ovarian CA      Social History     Socioeconomic History    Marital status:    Tobacco Use    Smoking status: Never    Smokeless tobacco: Never   Vaping Use    Vaping status: Never Used    Passive vaping exposure: Yes   Substance and " Sexual Activity    Alcohol use: No    Drug use: No    Sexual activity: Not Currently     Partners: Male     Birth control/protection: Hysterectomy     Comment: Hysterectomy      Health Maintenance Due   Topic Date Due    DIABETIC FOOT EXAM  Never done    DIABETIC EYE EXAM  Never done    Hepatitis B (1 of 3 - 19+ 3-dose series) Never done    Pneumococcal Vaccine 50+ (1 of 2 - PCV) Never done    TDAP/TD VACCINES (1 - Tdap) Never done    HEPATITIS C SCREENING  Never done    ANNUAL WELLNESS VISIT  Never done    URINE MICROALBUMIN-CREATININE RATIO (uACR)  11/22/2018    HEMOGLOBIN A1C  06/12/2020    LIPID PANEL  12/12/2020    ZOSTER VACCINE (1 of 2) Never done    COVID-19 Vaccine (3 - 2024-25 season) 09/01/2024    COLORECTAL CANCER SCREENING  11/11/2025        Current Medications:  Current Outpatient Medications   Medication Sig Dispense Refill    albuterol sulfate HFA (Ventolin HFA) 108 (90 Base) MCG/ACT inhaler Inhale 2 puffs Every 4 (Four) Hours As Needed for Wheezing or Shortness of Air. 18 g 2    Alcohol Swabs (DropSafe Alcohol Prep) 70 % pads       azelastine (ASTELIN) 0.1 % nasal spray 1 spray into the nostril(s) as directed by provider 2 (Two) Times a Day As Needed for Rhinitis or Allergies. Use in each nostril as directed 1 each 5    cetirizine (zyrTEC) 10 MG tablet Take 1 tablet (10 mg total) by mouth Every Night 90 tablet 3    dilTIAZem (TIAZAC) 360 MG 24 hr capsule Take 1 capsule by mouth Daily. 90 capsule 3    diphenhydrAMINE (BENADRYL) 25 mg capsule Take 1 capsule by mouth Every 6 (Six) Hours As Needed for Itching. (Patient taking differently: Take  by mouth Every 6 (Six) Hours As Needed for Itching. As needed)      EPINEPHrine (EPIPEN) 0.3 MG/0.3ML solution auto-injector injection 0.3 mg SC / IM; may repeat dose x 1 after 5-15 minutes 2 each 0    escitalopram (LEXAPRO) 20 MG tablet Take 1 tablet by mouth Daily. 90 tablet 1    fluticasone (FLONASE) 50 MCG/ACT nasal spray 1 spray 2 (Two) Times a Day. shake  liquid      glucose blood test strip 1 each by Other route 3 (Three) Times a Day Use as instructed 300 each 3    HYDROcodone-acetaminophen (NORCO) 5-325 MG per tablet Take 1 tablet by mouth Every 6 (Six) Hours As Needed.      Insulin Pen Needle (PEN NEEDLES) 32G X 4 MM misc 1 each 2 (Two) Times a Day Before Meals. 200 each 3    metFORMIN ER (GLUCOPHAGE-XR) 500 MG 24 hr tablet       montelukast (SINGULAIR) 10 MG tablet Take 1 tablet by mouth Daily.      Nebulizers ([employer] Aerosol Delivery System) misc As Needed.      NovoLOG FlexPen 100 UNIT/ML solution pen-injector sc pen       pantoprazole (PROTONIX) 40 MG EC tablet Take 1 tablet by mouth Daily. 90 tablet 1    pregabalin (LYRICA) 75 MG capsule Take 1 capsule by mouth 2 (Two) Times a Day.      Probiotic Product (Probiotic Mature Adult) capsule Take 1 capsule by mouth Daily.      solifenacin (VESICARE) 10 MG tablet Take 1 tablet by mouth Daily.      Tirzepatide 2.5 MG/0.5ML solution auto-injector Inject 2.5 mg under the skin into the appropriate area as directed 1 (One) Time Per Week. 6 mL 1    tolterodine LA (DETROL LA) 4 MG 24 hr capsule TAKE 1 CAPSULE BY MOUTH EVERY DAY 90 capsule 1    Toujeo Max SoloStar 300 UNIT/ML solution pen-injector injection INJECT 70 UNITS EVERYDAY BY SUBCUTANEOUS ROUTE IN THE EVENING FOR 30 DAYS      triamterene-hydrochlorothiazide (MAXZIDE-25) 37.5-25 MG per tablet Take 1 tablet by mouth Daily. 30 tablet 0    TRUEplus Lancets 33G misc       Xarelto 20 MG tablet TAKE 1 TABLET EVERY DAY 90 tablet 3    Continuous Blood Gluc Sensor (FreeStyle Wesley 3 Sensor) misc       doxazosin (Cardura) 1 MG tablet Take 1 tablet by mouth Every Night. 90 tablet 1    omeprazole (priLOSEC) 20 MG capsule TAKE ONE CAPSULE BY MOUTH ONCE DAILY 90 capsule 1     No current facility-administered medications for this visit.       Allergies:   Allergies   Allergen Reactions    Fish Allergy Anaphylaxis, Itching, Nausea And Vomiting, Hives and Unknown (See Comments)     "Pravastatin Myalgia    Shellfish Allergy Anaphylaxis    Nuts Other (See Comments)     Based on allergy testing, avoids pecans/walnuts, but tolerates peanuts/PB w/out issue    Rosuvastatin Myalgia    Losartan Angioedema    Ciprofloxacin-Dexamethasone Rash     Otic drops caused rash around the ears, but tolerates Cipro PO w/out issue    Egg-Derived Products Swelling    Neomycin-Bacitracin Zn-Polymyx Rash    Penicillins Palpitations, Other (See Comments) and Unknown (See Comments)     Keflex OK     Beta lactam allergy details    Antibiotic reaction: other (rapid heart rate)    Age at reaction: child    Dose to reaction time: unknown    Reason for antibiotic: unknown    Epinephrine required for reaction?: unknown    Tolerated antibiotics: other    tachycardia  Pt can tolerate Keflex and has had it several times per her account      Keflex OK  Beta lactam allergy details Antibiotic reaction: other (rapid heart rate) Age at reaction: child Dose to reaction time: unknown Reason for antibiotic: unknown Epinephrine required for reaction?: unknown Tolerated antibiotics: other       Vitals:   /97 (BP Location: Right arm, Patient Position: Sitting, Cuff Size: Adult)   Pulse 87   Temp 98 °F (36.7 °C) (Oral)   Resp 18   Ht 167.6 cm (65.98\")   Wt (!) 174 kg (383 lb)   SpO2 98%   BMI 61.85 kg/m²   Estimated body mass index is 61.85 kg/m² as calculated from the following:    Height as of this encounter: 167.6 cm (65.98\").    Weight as of this encounter: 174 kg (383 lb).    Azalea Mendoza  reports that she has never smoked. She has never used smokeless tobacco. I have educated her on the risk of diseases from using tobacco products such as cancer, COPD, and heart disease.         Physical Exam:   Physical Exam  Constitutional:       Appearance: Normal appearance. She is obese.   HENT:      Head: Normocephalic and atraumatic.      Right Ear: Tympanic membrane, ear canal and external ear normal.      Left Ear: " Tympanic membrane, ear canal and external ear normal.   Eyes:      Extraocular Movements: Extraocular movements intact.      Conjunctiva/sclera: Conjunctivae normal.   Cardiovascular:      Rate and Rhythm: Normal rate and regular rhythm.      Pulses: Normal pulses.      Heart sounds: Normal heart sounds.   Pulmonary:      Effort: Pulmonary effort is normal.      Breath sounds: Normal breath sounds.   Abdominal:      General: Abdomen is flat. Bowel sounds are normal.      Palpations: Abdomen is soft.   Feet:      Right foot:      Skin integrity: Skin integrity normal.      Left foot:      Skin integrity: Skin integrity normal.      Comments: Decreased sensation with monofilament over 1st toe bilateral feet  Skin:     General: Skin is warm and dry.   Neurological:      General: No focal deficit present.      Mental Status: She is alert. Mental status is at baseline.          Lab Results:   No visits with results within 3 Month(s) from this visit.   Latest known visit with results is:   Admission on 12/28/2024, Discharged on 12/28/2024   Component Date Value Ref Range Status    Glucose 12/28/2024 272 (H)  65 - 99 mg/dL Final    BUN 12/28/2024 15  6 - 20 mg/dL Final    Creatinine 12/28/2024 0.72  0.57 - 1.00 mg/dL Final    Sodium 12/28/2024 138  136 - 145 mmol/L Final    Potassium 12/28/2024 3.9  3.5 - 5.2 mmol/L Final    Chloride 12/28/2024 99  98 - 107 mmol/L Final    CO2 12/28/2024 22.9  22.0 - 29.0 mmol/L Final    Calcium 12/28/2024 9.2  8.6 - 10.5 mg/dL Final    BUN/Creatinine Ratio 12/28/2024 20.8  7.0 - 25.0 Final    Anion Gap 12/28/2024 16.1 (H)  5.0 - 15.0 mmol/L Final    eGFR 12/28/2024 100.1  >60.0 mL/min/1.73 Final    WBC 12/28/2024 13.69 (H)  3.40 - 10.80 10*3/mm3 Final    RBC 12/28/2024 5.78 (H)  3.77 - 5.28 10*6/mm3 Final    Hemoglobin 12/28/2024 15.9  12.0 - 15.9 g/dL Final    Hematocrit 12/28/2024 47.4 (H)  34.0 - 46.6 % Final    MCV 12/28/2024 82.0  79.0 - 97.0 fL Final    MCH 12/28/2024 27.5  26.6 -  33.0 pg Final    MCHC 12/28/2024 33.5  31.5 - 35.7 g/dL Final    RDW 12/28/2024 13.0  12.3 - 15.4 % Final    RDW-SD 12/28/2024 38.5  37.0 - 54.0 fl Final    MPV 12/28/2024 9.4  6.0 - 12.0 fL Final    Platelets 12/28/2024 398  140 - 450 10*3/mm3 Final    Neutrophil % 12/28/2024 67.5  42.7 - 76.0 % Final    Lymphocyte % 12/28/2024 23.5  19.6 - 45.3 % Final    Monocyte % 12/28/2024 5.6  5.0 - 12.0 % Final    Eosinophil % 12/28/2024 2.3  0.3 - 6.2 % Final    Basophil % 12/28/2024 0.4  0.0 - 1.5 % Final    Immature Grans % 12/28/2024 0.7 (H)  0.0 - 0.5 % Final    Neutrophils, Absolute 12/28/2024 9.24 (H)  1.70 - 7.00 10*3/mm3 Final    Lymphocytes, Absolute 12/28/2024 3.22 (H)  0.70 - 3.10 10*3/mm3 Final    Monocytes, Absolute 12/28/2024 0.77  0.10 - 0.90 10*3/mm3 Final    Eosinophils, Absolute 12/28/2024 0.31  0.00 - 0.40 10*3/mm3 Final    Basophils, Absolute 12/28/2024 0.06  0.00 - 0.20 10*3/mm3 Final    Immature Grans, Absolute 12/28/2024 0.09 (H)  0.00 - 0.05 10*3/mm3 Final    nRBC 12/28/2024 0.0  0.0 - 0.2 /100 WBC Final       Results review: During today's encounter, all relevant clinical data has been reviewed.      Assessment and Plan  Diagnoses and all orders for this visit:    1. Right upper quadrant abdominal pain (Primary)  -     US Abdomen Limited; Future  -     Phosphorus; Future  -     Amylase; Future  -     Lipase; Future  -     Urine Culture - Urine, Urine, Clean Catch; Future  -     Urinalysis With Microscopic - Urine, Clean Catch; Future    2. Screen for colon cancer  -     Ambulatory Referral For Screening Colonoscopy    3. Type 2 diabetes mellitus with hyperglycemia, with long-term current use of insulin  -     CBC & Differential; Future  -     Hemoglobin A1c; Future  -     Magnesium; Future  -     Microalbumin / Creatinine Urine Ratio - Urine, Clean Catch; Future    4. Morbid obesity    5. Gastroesophageal reflux disease without esophagitis    6. Overactive bladder    7. PAF (paroxysmal atrial  fibrillation)    8. Benign essential hypertension  -     TSH+Free T4; Future  -     Comprehensive Metabolic Panel; Future    9. Mixed hyperlipidemia  -     Lipid Panel; Future    10. Angioedema, subsequent encounter    Other orders  -     pantoprazole (PROTONIX) 40 MG EC tablet; Take 1 tablet by mouth Daily.  Dispense: 90 tablet; Refill: 1  -     Discontinue: escitalopram (LEXAPRO) 20 MG tablet; Take 1 tablet by mouth Daily.  Dispense: 90 tablet; Refill: 1  -     Discontinue: Tirzepatide 2.5 MG/0.5ML solution auto-injector; Inject 2.5 mg under the skin into the appropriate area as directed 1 (One) Time Per Week.  Dispense: 6 mL; Refill: 1  -     escitalopram (LEXAPRO) 20 MG tablet; Take 1 tablet by mouth Daily.  Dispense: 90 tablet; Refill: 1  -     Tirzepatide 2.5 MG/0.5ML solution auto-injector; Inject 2.5 mg under the skin into the appropriate area as directed 1 (One) Time Per Week.  Dispense: 6 mL; Refill: 1  -     doxazosin (Cardura) 1 MG tablet; Take 1 tablet by mouth Every Night.  Dispense: 90 tablet; Refill: 1        Class 3 Severe Obesity (BMI >=40). Obesity-related health conditions include the following: obstructive sleep apnea, hypertension, diabetes mellitus, dyslipidemias, and GERD. Obesity is unchanged. BMI is above average, plan discussed. We discussed portion control and pharmacologic options including mounjaro.       New Medications:   New Medications Ordered This Visit   Medications    pantoprazole (PROTONIX) 40 MG EC tablet     Sig: Take 1 tablet by mouth Daily.     Dispense:  90 tablet     Refill:  1    escitalopram (LEXAPRO) 20 MG tablet     Sig: Take 1 tablet by mouth Daily.     Dispense:  90 tablet     Refill:  1    Tirzepatide 2.5 MG/0.5ML solution auto-injector     Sig: Inject 2.5 mg under the skin into the appropriate area as directed 1 (One) Time Per Week.     Dispense:  6 mL     Refill:  1    doxazosin (Cardura) 1 MG tablet     Sig: Take 1 tablet by mouth Every Night.     Dispense:  90  tablet     Refill:  1       Discontinued Medications:   Medications Discontinued During This Encounter   Medication Reason    losartan (COZAAR) 100 MG tablet Allergic response    escitalopram (LEXAPRO) 20 MG tablet Reorder    pantoprazole (PROTONIX) 40 MG EC tablet Reorder    escitalopram (LEXAPRO) 20 MG tablet     Tirzepatide 2.5 MG/0.5ML solution auto-injector               Visit Diagnoses:    ICD-10-CM ICD-9-CM   1. Right upper quadrant abdominal pain  R10.11 789.01   2. Screen for colon cancer  Z12.11 V76.51   3. Type 2 diabetes mellitus with hyperglycemia, with long-term current use of insulin  E11.65 250.00    Z79.4 790.29     V58.67   4. Morbid obesity  E66.01 278.01   5. Gastroesophageal reflux disease without esophagitis  K21.9 530.81   6. Overactive bladder  N32.81 596.51   7. PAF (paroxysmal atrial fibrillation)  I48.0 427.31   8. Benign essential hypertension  I10 401.1   9. Mixed hyperlipidemia  E78.2 272.2   10. Angioedema, subsequent encounter  T78.3XXD V58.89     995.1            Diabetes, uncontrolled with neuropathy  A1C in 9/24 was 13.9, recheck now  Discussed lifestyle modification including healthy diet low in simple carbohydrates and regular exercise  Continue Humalin 70U TID, Novolog. Continue metformin.  Up to date on foot exam (today). Needs eye exam, and nephropathy screening  Follow up 3 months    Abdominal pain  Concern for gallstones with cholecystitis worsened by GLP1  Ddx pancreas, gastroenteritis  Will get labs and US    Depression  Moderate control  Increase Lexapro to 20mg    Hypertension  Well controlled  Continue current medications - Maxzide, diltiazem. Stop losartan with angioedema. Start on doxazosin  Advised to call back to titrate up dose if BP is high  Follow up 3 months    Angioedema episodes  Will stop losartan  Educated on precautions    Hyperlipidemia  Well controlled  Will check lipid panel/CMP  Could not tolerate statin    Atrial fibrillation  Follows   Kandi  Continue Diltiazem and Eliquis    Lung nodules/LUCERO  Follows Dr. Schneider  CT showed stable nodules 6/21 did not need more    Astgna  Continue Dulera  Actively managed with Pulmonology    Allergic Rhinitis  Well controlled  Continue Xyzal and PRN Flonase    GERD  Moderate control  Restart pantoprazole    Morbid Obesity  Discussed lifestyle modification including healthy diet and regular exercise    OAB  Continue Vesicare    HM  Mammogram 1/25  Will refer for colonoscopy      Follow Up:   Return in about 3 months (around 9/9/2025) for Medicare Wellness.    Patient was given instructions and counseling regarding her condition or for health maintenance advice. Please see specific information pulled into the AVS if appropriate.       This document has been electronically signed by Nallely Frazier MD   June 9, 2025 12:12 EDT    Dictated Utilizing Dragon Dictation: Part of this note may be an electronic transcription/translation of spoken language to printed text using the Dragon Dictation System.

## 2025-06-10 ENCOUNTER — PATIENT ROUNDING (BHMG ONLY) (OUTPATIENT)
Age: 55
End: 2025-06-10
Payer: MEDICARE

## 2025-06-10 NOTE — PROGRESS NOTES
Mirta 10, 2025    Hello, may I speak with Azalea Mendoza?    My name is Sebastian Escobedo    I am  with [facility] [address] [facility]  [address] [place] [id number].    Before we get started may I verify your date of birth? 1970    I am calling to officially welcome you to our practice and ask about your recent visit. Is this a good time to talk?     Yes - I spoke with the patient at check out.     Tell me about your visit with us. What things went well?      Patient was seen today by Dr. Nallely Frazier. She followed Dr Frazier from her last practice. She said the registration staff was great to work with. She said the wait time was very fast and the clinical staff did a great job as a well. She was very please with her visit with Dr. Frazier.           We're always looking for ways to make our patients' experiences even better. Do you have recommendations on ways we may improve?      no    Overall were you satisfied with your first visit to our practice?     yes       I appreciate you taking the time to speak with me today. Is there anything else I can do for you?     no      Thank you, and have a great day.

## 2025-06-13 ENCOUNTER — PREP FOR SURGERY (OUTPATIENT)
Dept: OTHER | Facility: HOSPITAL | Age: 55
End: 2025-06-13
Payer: MEDICARE

## 2025-06-13 DIAGNOSIS — Z12.11 ENCOUNTER FOR SCREENING COLONOSCOPY: Primary | ICD-10-CM

## 2025-06-16 RX ORDER — PROCHLORPERAZINE 25 MG/1
1 SUPPOSITORY RECTAL
Qty: 10 EACH | Refills: 30 | Status: SHIPPED | OUTPATIENT
Start: 2025-06-16

## 2025-06-16 RX ORDER — PROCHLORPERAZINE 25 MG/1
1 SUPPOSITORY RECTAL
COMMUNITY
End: 2025-06-16 | Stop reason: SDUPTHER

## 2025-06-16 RX ORDER — DOXAZOSIN 1 MG/1
1 TABLET ORAL NIGHTLY
Qty: 90 TABLET | Refills: 1 | Status: SHIPPED | OUTPATIENT
Start: 2025-06-16

## 2025-06-18 ENCOUNTER — TELEPHONE (OUTPATIENT)
Dept: GASTROENTEROLOGY | Facility: CLINIC | Age: 55
End: 2025-06-18

## 2025-06-18 NOTE — TELEPHONE ENCOUNTER
PT HAS BEEN ADDED TO [facility] SCHEDULE FOR [facility]/2 FOR A COLON AND IS ON XARELTO MANAGED BY DR YSABEL MARAVILLA.   
DISPLAY PLAN FREE TEXT

## 2025-06-30 DIAGNOSIS — J45.20 MILD INTERMITTENT ASTHMA WITHOUT COMPLICATION: ICD-10-CM

## 2025-06-30 RX ORDER — ALBUTEROL SULFATE 90 UG/1
2 INHALANT RESPIRATORY (INHALATION) EVERY 4 HOURS PRN
Qty: 18 G | Refills: 2 | Status: SHIPPED | OUTPATIENT
Start: 2025-06-30

## 2025-07-03 RX ORDER — PROCHLORPERAZINE 25 MG/1
1 SUPPOSITORY RECTAL
Qty: 10 EACH | Refills: 30 | Status: SHIPPED | OUTPATIENT
Start: 2025-07-03

## 2025-07-03 NOTE — TELEPHONE ENCOUNTER
Caller: [facility] - [place], [place] - [address] - [phone] [facility] [phone] FX    Relationship: Pharmacy    Best call back number: [phone]     Requested Prescriptions:   Requested Prescriptions     Pending Prescriptions Disp Refills    Continuous Glucose Sensor (Dexcom G6 Sensor) 10 each 30     Sig: Use 1 Application Every 10 (Ten) Days.        Pharmacy where request should be sent: [facility] - [place], [place] - [address] - [phone]  - [phone] FX     Last office visit with prescribing clinician: 6/9/2025   Last telemedicine visit with prescribing clinician: Visit date not found   Next office visit with prescribing clinician: 9/15/2025         Does the patient have less than a 3 day supply:  [x] Yes  [] No    Would you like a call back once the refill request has been completed: [] Yes [x] No    If the office needs to give you a call back, can they leave a voicemail: [] Yes [x] No    Scarlet Dixon Rep   07/03/25 10:29 EDT

## 2025-07-15 LAB
HBA1C MFR BLD: 11.9 %
HBA1C MFR BLD: 13.8 %

## 2025-07-29 ENCOUNTER — OFFICE VISIT (OUTPATIENT)
Dept: PULMONOLOGY | Facility: CLINIC | Age: 55
End: 2025-07-29
Payer: MEDICARE

## 2025-07-29 VITALS
BODY MASS INDEX: 48.82 KG/M2 | HEIGHT: 65 IN | SYSTOLIC BLOOD PRESSURE: 118 MMHG | HEART RATE: 99 BPM | RESPIRATION RATE: 16 BRPM | WEIGHT: 293 LBS | DIASTOLIC BLOOD PRESSURE: 78 MMHG | OXYGEN SATURATION: 95 %

## 2025-07-29 DIAGNOSIS — J45.20 MILD INTERMITTENT ASTHMA WITHOUT COMPLICATION: Primary | ICD-10-CM

## 2025-07-29 DIAGNOSIS — G47.33 OSA (OBSTRUCTIVE SLEEP APNEA): ICD-10-CM

## 2025-07-29 DIAGNOSIS — J30.89 OTHER ALLERGIC RHINITIS: ICD-10-CM

## 2025-07-29 PROCEDURE — 3078F DIAST BP <80 MM HG: CPT | Performed by: INTERNAL MEDICINE

## 2025-07-29 PROCEDURE — 99214 OFFICE O/P EST MOD 30 MIN: CPT | Performed by: INTERNAL MEDICINE

## 2025-07-29 PROCEDURE — 3074F SYST BP LT 130 MM HG: CPT | Performed by: INTERNAL MEDICINE

## 2025-07-29 RX ORDER — AMLODIPINE BESYLATE 10 MG/1
10 TABLET ORAL DAILY
COMMUNITY

## 2025-07-29 NOTE — PROGRESS NOTES
"  Chief Complaint   Patient presents with    Sleeping Problem    Follow-up         Subjective   Azalea Mendoza is a 54 y.o. female.   Patient was evaluated today for follow up of asthma, allergic rhinitis and LUCERO.     Patient does not report any recent exacerbations requiring emergency room visits or hospitalizations.     Patient is compliant with pulmonary medicines, as prescribed.     she is using the rescue inhalers minimally.     Patient says that she has been using her nasal sprays on a seasonal basis and describes no significant ongoing issues other than occasional congestion.     Patient doesn't report any issues with the PAP device. The patient describes no significant issues with her mask either.     Patient says that the compliance with the use of the equipment is good.     Patient says that her symptoms of fatigue & daytime sleepiness have been helped greatly with the use of PAP, as prescribed.     The following portions of the patient's history were reviewed and updated as appropriate: allergies, current medications, past family history, past medical history, past social history, past surgical history, and problem list.    Review of Systems   HENT:  Negative for sinus pressure, sneezing and sore throat.    Respiratory:  Negative for cough, chest tightness, shortness of breath and wheezing.        Objective   Visit Vitals  /78   Pulse 99   Resp 16   Ht 165.1 cm (65\") Comment: pt reported   Wt (!) 171 kg (378 lb)   SpO2 95%   BMI 62.90 kg/m²       BMI Readings from Last 8 Encounters:   07/29/25 62.90 kg/m²   06/09/25 61.85 kg/m²   01/30/25 61.50 kg/m²   12/28/24 61.59 kg/m²   12/28/24 61.46 kg/m²   12/12/24 61.40 kg/m²   06/24/24 58.73 kg/m²   03/11/24 61.54 kg/m²       Physical Exam  Vitals reviewed.   Constitutional:       Appearance: She is well-developed.   HENT:      Head: Atraumatic.      Mouth/Throat:      Comments: Oropharynx was crowded.  Cardiovascular:      Rate and Rhythm: Normal " rate and regular rhythm.      Heart sounds: Normal heart sounds. No murmur heard.  Pulmonary:      Effort: Pulmonary effort is normal. No respiratory distress.      Breath sounds: Normal breath sounds. No wheezing or rales.   Neurological:      Mental Status: She is alert and oriented to person, place, and time.           Assessment & Plan   Diagnoses and all orders for this visit:    1. Mild intermittent asthma without complication (Primary)    2. Other allergic rhinitis    3. LUCERO (obstructive sleep apnea)    4. BMI 60.0-69.9, adult           Return in about 8 months (around 3/29/2026) for Recheck, For Shantel Sanchez).    DISCUSSION (if any):  It appears that her symptoms of asthma are under adequate control with the current regimen.    Patient's medications for underlying asthma were reviewed in great detail.    Patient was advised to use rescue inhaler for when necessary purposes    Patient was also advised to keep a log of the use of rescue inhaler.      Side effects of prescribed medications discussed with the patient.    Patient was advised to continue her nasal spray on a seasonal basis, since her symptoms are consistent with seasonal allergic rhinitis.    Sleep study performed in 2010  AHI was 27 / hour.     Latest PAP device provided in Oct 2024  DME company: [employer]    Current PAP settings: 10-20  Current mask type: nasal cushion.    Compliance data will be obtained from the her device/DME company.    Continue PAP device on a regular basis, at least 4 hours or more per night.    Sleep hygiene measures were discussed    Weight loss advised.    Vaccination status addressed.    For the vaccines, that she refused today, I have asked her to discuss this further with her PCP.      Dictated utilizing Dragon dictation.    This document was electronically signed by Rafy Schneider MD on 07/29/25 at 10:41 EDT

## 2025-08-14 ENCOUNTER — HOSPITAL ENCOUNTER (OUTPATIENT)
Dept: ULTRASOUND IMAGING | Facility: HOSPITAL | Age: 55
Discharge: HOME | End: 2025-08-14
Payer: MEDICARE

## 2025-08-14 ENCOUNTER — LAB (OUTPATIENT)
Dept: LAB | Facility: HOSPITAL | Age: 55
End: 2025-08-14
Payer: MEDICARE

## 2025-08-14 ENCOUNTER — RESULTS FOLLOW-UP (OUTPATIENT)
Age: 55
End: 2025-08-14
Payer: MEDICARE

## 2025-08-14 DIAGNOSIS — E11.65 TYPE 2 DIABETES MELLITUS WITH HYPERGLYCEMIA, WITH LONG-TERM CURRENT USE OF INSULIN: ICD-10-CM

## 2025-08-14 DIAGNOSIS — E78.2 MIXED HYPERLIPIDEMIA: ICD-10-CM

## 2025-08-14 DIAGNOSIS — Z79.4 TYPE 2 DIABETES MELLITUS WITH HYPERGLYCEMIA, WITH LONG-TERM CURRENT USE OF INSULIN: ICD-10-CM

## 2025-08-14 DIAGNOSIS — R10.11 RIGHT UPPER QUADRANT ABDOMINAL PAIN: ICD-10-CM

## 2025-08-14 DIAGNOSIS — I10 BENIGN ESSENTIAL HYPERTENSION: Chronic | ICD-10-CM

## 2025-08-14 LAB
ALBUMIN SERPL-MCNC: 3.9 G/DL (ref 3.5–5.2)
ALBUMIN UR-MCNC: 2.3 MG/DL
ALBUMIN/GLOB SERPL: 1.1 G/DL
ALP SERPL-CCNC: 104 U/L (ref 39–117)
ALT SERPL W P-5'-P-CCNC: 7 U/L (ref 1–33)
AMYLASE SERPL-CCNC: 16 U/L (ref 28–100)
ANION GAP SERPL CALCULATED.3IONS-SCNC: 18.5 MMOL/L (ref 5–15)
AST SERPL-CCNC: 17 U/L (ref 1–32)
BACTERIA UR QL AUTO: ABNORMAL /HPF
BASOPHILS # BLD AUTO: 0.07 10*3/MM3 (ref 0–0.2)
BASOPHILS NFR BLD AUTO: 0.6 % (ref 0–1.5)
BILIRUB SERPL-MCNC: 0.4 MG/DL (ref 0–1.2)
BILIRUB UR QL STRIP: NEGATIVE
BUN SERPL-MCNC: 17 MG/DL (ref 6–20)
BUN/CREAT SERPL: 21.5 (ref 7–25)
CALCIUM SPEC-SCNC: 9.8 MG/DL (ref 8.6–10.5)
CHLORIDE SERPL-SCNC: 97 MMOL/L (ref 98–107)
CHOLEST SERPL-MCNC: 277 MG/DL (ref 0–200)
CLARITY UR: CLEAR
CO2 SERPL-SCNC: 20.5 MMOL/L (ref 22–29)
COLOR UR: YELLOW
CREAT SERPL-MCNC: 0.79 MG/DL (ref 0.57–1)
CREAT UR-MCNC: 67.8 MG/DL
DEPRECATED RDW RBC AUTO: 39.7 FL (ref 37–54)
EGFRCR SERPLBLD CKD-EPI 2021: 89.6 ML/MIN/1.73
EOSINOPHIL # BLD AUTO: 0.74 10*3/MM3 (ref 0–0.4)
EOSINOPHIL NFR BLD AUTO: 6.2 % (ref 0.3–6.2)
ERYTHROCYTE [DISTWIDTH] IN BLOOD BY AUTOMATED COUNT: 12.2 % (ref 12.3–15.4)
GLOBULIN UR ELPH-MCNC: 3.4 GM/DL
GLUCOSE SERPL-MCNC: 348 MG/DL (ref 65–99)
GLUCOSE UR STRIP-MCNC: ABNORMAL MG/DL
HBA1C MFR BLD: 13.5 % (ref 4.8–5.6)
HCT VFR BLD AUTO: 49.3 % (ref 34–46.6)
HDLC SERPL-MCNC: 41 MG/DL (ref 40–60)
HGB BLD-MCNC: 15.9 G/DL (ref 12–15.9)
HGB UR QL STRIP.AUTO: NEGATIVE
HYALINE CASTS UR QL AUTO: ABNORMAL /LPF
IMM GRANULOCYTES # BLD AUTO: 0.06 10*3/MM3 (ref 0–0.05)
IMM GRANULOCYTES NFR BLD AUTO: 0.5 % (ref 0–0.5)
KETONES UR QL STRIP: NEGATIVE
LDLC SERPL CALC-MCNC: 153 MG/DL (ref 0–100)
LDLC/HDLC SERPL: 3.63 {RATIO}
LEUKOCYTE ESTERASE UR QL STRIP.AUTO: ABNORMAL
LIPASE SERPL-CCNC: 29 U/L (ref 13–60)
LYMPHOCYTES # BLD AUTO: 3.45 10*3/MM3 (ref 0.7–3.1)
LYMPHOCYTES NFR BLD AUTO: 28.7 % (ref 19.6–45.3)
MAGNESIUM SERPL-MCNC: 1.8 MG/DL (ref 1.6–2.6)
MCH RBC QN AUTO: 28.8 PG (ref 26.6–33)
MCHC RBC AUTO-ENTMCNC: 32.3 G/DL (ref 31.5–35.7)
MCV RBC AUTO: 89.3 FL (ref 79–97)
MICROALBUMIN/CREAT UR: 33.9 MG/G (ref 0–29)
MONOCYTES # BLD AUTO: 0.63 10*3/MM3 (ref 0.1–0.9)
MONOCYTES NFR BLD AUTO: 5.2 % (ref 5–12)
NEUTROPHILS NFR BLD AUTO: 58.8 % (ref 42.7–76)
NEUTROPHILS NFR BLD AUTO: 7.07 10*3/MM3 (ref 1.7–7)
NITRITE UR QL STRIP: NEGATIVE
NRBC BLD AUTO-RTO: 0 /100 WBC (ref 0–0.2)
PH UR STRIP.AUTO: 6.5 [PH] (ref 5–8)
PHOSPHATE SERPL-MCNC: 4.1 MG/DL (ref 2.5–4.5)
PLATELET # BLD AUTO: 442 10*3/MM3 (ref 140–450)
PMV BLD AUTO: 10 FL (ref 6–12)
POTASSIUM SERPL-SCNC: 4.2 MMOL/L (ref 3.5–5.2)
PROT SERPL-MCNC: 7.3 G/DL (ref 6–8.5)
PROT UR QL STRIP: ABNORMAL
RBC # BLD AUTO: 5.52 10*6/MM3 (ref 3.77–5.28)
RBC # UR STRIP: ABNORMAL /HPF
REF LAB TEST METHOD: ABNORMAL
SODIUM SERPL-SCNC: 136 MMOL/L (ref 136–145)
SP GR UR STRIP: >1.03 (ref 1–1.03)
SQUAMOUS #/AREA URNS HPF: ABNORMAL /HPF
T4 FREE SERPL-MCNC: 1.24 NG/DL (ref 0.92–1.68)
TRIGL SERPL-MCNC: 436 MG/DL (ref 0–150)
TSH SERPL DL<=0.05 MIU/L-ACNC: 2.16 UIU/ML (ref 0.27–4.2)
UROBILINOGEN UR QL STRIP: ABNORMAL
VLDLC SERPL-MCNC: 83 MG/DL (ref 5–40)
WBC # UR STRIP: ABNORMAL /HPF
WBC NRBC COR # BLD AUTO: 12.02 10*3/MM3 (ref 3.4–10.8)

## 2025-08-14 PROCEDURE — 83690 ASSAY OF LIPASE: CPT

## 2025-08-14 PROCEDURE — 87086 URINE CULTURE/COLONY COUNT: CPT

## 2025-08-14 PROCEDURE — 80061 LIPID PANEL: CPT

## 2025-08-14 PROCEDURE — 84100 ASSAY OF PHOSPHORUS: CPT

## 2025-08-14 PROCEDURE — 82570 ASSAY OF URINE CREATININE: CPT

## 2025-08-14 PROCEDURE — 84443 ASSAY THYROID STIM HORMONE: CPT

## 2025-08-14 PROCEDURE — 76705 ECHO EXAM OF ABDOMEN: CPT

## 2025-08-14 PROCEDURE — 82043 UR ALBUMIN QUANTITATIVE: CPT

## 2025-08-14 PROCEDURE — 85025 COMPLETE CBC W/AUTO DIFF WBC: CPT

## 2025-08-14 PROCEDURE — 80053 COMPREHEN METABOLIC PANEL: CPT

## 2025-08-14 PROCEDURE — 81001 URINALYSIS AUTO W/SCOPE: CPT

## 2025-08-14 PROCEDURE — 83735 ASSAY OF MAGNESIUM: CPT

## 2025-08-14 PROCEDURE — 82150 ASSAY OF AMYLASE: CPT

## 2025-08-14 PROCEDURE — 83036 HEMOGLOBIN GLYCOSYLATED A1C: CPT

## 2025-08-14 PROCEDURE — 84439 ASSAY OF FREE THYROXINE: CPT

## 2025-08-15 LAB — BACTERIA SPEC AEROBE CULT: NO GROWTH

## 2025-08-22 ENCOUNTER — PATIENT MESSAGE (OUTPATIENT)
Dept: CARDIOLOGY | Facility: CLINIC | Age: 55
End: 2025-08-22
Payer: MEDICARE

## 2025-08-26 ENCOUNTER — PRE-ADMISSION TESTING (OUTPATIENT)
Dept: PREADMISSION TESTING | Facility: HOSPITAL | Age: 55
End: 2025-08-26
Payer: MEDICARE

## 2025-08-26 VITALS — HEIGHT: 67 IN | WEIGHT: 293 LBS | BODY MASS INDEX: 45.99 KG/M2

## 2025-08-26 LAB
DEPRECATED RDW RBC AUTO: 40.9 FL (ref 37–54)
ERYTHROCYTE [DISTWIDTH] IN BLOOD BY AUTOMATED COUNT: 12.8 % (ref 12.3–15.4)
HCT VFR BLD AUTO: 47.9 % (ref 34–46.6)
HGB BLD-MCNC: 15.4 G/DL (ref 12–15.9)
INR PPP: 1.14 (ref 0.89–1.12)
MCH RBC QN AUTO: 28.3 PG (ref 26.6–33)
MCHC RBC AUTO-ENTMCNC: 32.2 G/DL (ref 31.5–35.7)
MCV RBC AUTO: 88.1 FL (ref 79–97)
PLATELET # BLD AUTO: 381 10*3/MM3 (ref 140–450)
PMV BLD AUTO: 9.3 FL (ref 6–12)
POTASSIUM SERPL-SCNC: 3.6 MMOL/L (ref 3.5–5.2)
PROTHROMBIN TIME: 15.3 SECONDS (ref 12.2–15.3)
RBC # BLD AUTO: 5.44 10*6/MM3 (ref 3.77–5.28)
WBC NRBC COR # BLD AUTO: 11.44 10*3/MM3 (ref 3.4–10.8)

## 2025-08-26 PROCEDURE — 36415 COLL VENOUS BLD VENIPUNCTURE: CPT

## 2025-08-26 PROCEDURE — 85027 COMPLETE CBC AUTOMATED: CPT

## 2025-08-26 PROCEDURE — 84132 ASSAY OF SERUM POTASSIUM: CPT

## 2025-08-26 PROCEDURE — 85610 PROTHROMBIN TIME: CPT

## 2025-08-26 PROCEDURE — 93005 ELECTROCARDIOGRAM TRACING: CPT

## 2025-08-26 RX ORDER — SODIUM PICOSULFATE, MAGNESIUM OXIDE, AND ANHYDROUS CITRIC ACID 10; 3.5; 12 MG/160ML; G/160ML; G/160ML
350 LIQUID ORAL TAKE AS DIRECTED
Qty: 350 ML | Refills: 0 | Status: SHIPPED | OUTPATIENT
Start: 2025-08-26 | End: 2025-08-28

## 2025-08-27 LAB
QT INTERVAL: 392 MS
QTC INTERVAL: 449 MS

## 2025-08-28 ENCOUNTER — TELEPHONE (OUTPATIENT)
Dept: GASTROENTEROLOGY | Facility: CLINIC | Age: 55
End: 2025-08-28
Payer: MEDICARE

## 2025-08-28 RX ORDER — SODIUM, POTASSIUM,MAG SULFATES 17.5-3.13G
1 SOLUTION, RECONSTITUTED, ORAL ORAL EVERY 12 HOURS
Qty: 354 ML | Refills: 0 | Status: SHIPPED | OUTPATIENT
Start: 2025-08-28